# Patient Record
Sex: MALE | Race: WHITE | NOT HISPANIC OR LATINO | Employment: OTHER | URBAN - METROPOLITAN AREA
[De-identification: names, ages, dates, MRNs, and addresses within clinical notes are randomized per-mention and may not be internally consistent; named-entity substitution may affect disease eponyms.]

---

## 2017-04-26 ENCOUNTER — APPOINTMENT (OUTPATIENT)
Dept: LAB | Facility: MEDICAL CENTER | Age: 72
End: 2017-04-26
Payer: MEDICARE

## 2017-04-26 ENCOUNTER — TRANSCRIBE ORDERS (OUTPATIENT)
Dept: ADMINISTRATIVE | Facility: HOSPITAL | Age: 72
End: 2017-04-26

## 2017-04-26 DIAGNOSIS — I10 ESSENTIAL (PRIMARY) HYPERTENSION: ICD-10-CM

## 2017-04-26 DIAGNOSIS — R97.20 ELEVATED PROSTATE SPECIFIC ANTIGEN (PSA): ICD-10-CM

## 2017-04-26 DIAGNOSIS — R97.20 ELEVATED PROSTATE SPECIFIC ANTIGEN (PSA): Primary | ICD-10-CM

## 2017-04-26 DIAGNOSIS — E78.5 HYPERLIPIDEMIA: ICD-10-CM

## 2017-04-26 LAB
ALBUMIN SERPL BCP-MCNC: 3.4 G/DL (ref 3.5–5)
ALP SERPL-CCNC: 53 U/L (ref 46–116)
ALT SERPL W P-5'-P-CCNC: 20 U/L (ref 12–78)
ANION GAP SERPL CALCULATED.3IONS-SCNC: 9 MMOL/L (ref 4–13)
AST SERPL W P-5'-P-CCNC: 11 U/L (ref 5–45)
BASOPHILS # BLD AUTO: 0.08 THOUSANDS/ΜL (ref 0–0.1)
BASOPHILS NFR BLD AUTO: 2 % (ref 0–1)
BILIRUB SERPL-MCNC: 0.42 MG/DL (ref 0.2–1)
BUN SERPL-MCNC: 19 MG/DL (ref 5–25)
CALCIUM SERPL-MCNC: 8.9 MG/DL (ref 8.3–10.1)
CHLORIDE SERPL-SCNC: 105 MMOL/L (ref 100–108)
CHOLEST SERPL-MCNC: 185 MG/DL (ref 50–200)
CO2 SERPL-SCNC: 26 MMOL/L (ref 21–32)
CREAT SERPL-MCNC: 1 MG/DL (ref 0.6–1.3)
EOSINOPHIL # BLD AUTO: 0.24 THOUSAND/ΜL (ref 0–0.61)
EOSINOPHIL NFR BLD AUTO: 5 % (ref 0–6)
ERYTHROCYTE [DISTWIDTH] IN BLOOD BY AUTOMATED COUNT: 13.4 % (ref 11.6–15.1)
GFR SERPL CREATININE-BSD FRML MDRD: >60 ML/MIN/1.73SQ M
GLUCOSE P FAST SERPL-MCNC: 104 MG/DL (ref 65–99)
HCT VFR BLD AUTO: 43.8 % (ref 36.5–49.3)
HDLC SERPL-MCNC: 49 MG/DL (ref 40–60)
HGB BLD-MCNC: 14.5 G/DL (ref 12–17)
LDLC SERPL DIRECT ASSAY-MCNC: 126 MG/DL (ref 0–100)
LYMPHOCYTES # BLD AUTO: 1.3 THOUSANDS/ΜL (ref 0.6–4.47)
LYMPHOCYTES NFR BLD AUTO: 27 % (ref 14–44)
MCH RBC QN AUTO: 29.4 PG (ref 26.8–34.3)
MCHC RBC AUTO-ENTMCNC: 33.1 G/DL (ref 31.4–37.4)
MCV RBC AUTO: 89 FL (ref 82–98)
MONOCYTES # BLD AUTO: 0.6 THOUSAND/ΜL (ref 0.17–1.22)
MONOCYTES NFR BLD AUTO: 13 % (ref 4–12)
NEUTROPHILS # BLD AUTO: 2.53 THOUSANDS/ΜL (ref 1.85–7.62)
NEUTS SEG NFR BLD AUTO: 53 % (ref 43–75)
NRBC BLD AUTO-RTO: 0 /100 WBCS
PLATELET # BLD AUTO: 233 THOUSANDS/UL (ref 149–390)
PMV BLD AUTO: 10.7 FL (ref 8.9–12.7)
POTASSIUM SERPL-SCNC: 4 MMOL/L (ref 3.5–5.3)
PROT SERPL-MCNC: 6.4 G/DL (ref 6.4–8.2)
PSA SERPL-MCNC: 1.8 NG/ML (ref 0–4)
RBC # BLD AUTO: 4.94 MILLION/UL (ref 3.88–5.62)
SODIUM SERPL-SCNC: 140 MMOL/L (ref 136–145)
TSH SERPL DL<=0.05 MIU/L-ACNC: 1.26 UIU/ML (ref 0.36–3.74)
WBC # BLD AUTO: 4.76 THOUSAND/UL (ref 4.31–10.16)

## 2017-04-26 PROCEDURE — 84443 ASSAY THYROID STIM HORMONE: CPT

## 2017-04-26 PROCEDURE — 85025 COMPLETE CBC W/AUTO DIFF WBC: CPT

## 2017-04-26 PROCEDURE — G0103 PSA SCREENING: HCPCS

## 2017-04-26 PROCEDURE — 83718 ASSAY OF LIPOPROTEIN: CPT

## 2017-04-26 PROCEDURE — 82465 ASSAY BLD/SERUM CHOLESTEROL: CPT

## 2017-04-26 PROCEDURE — 80053 COMPREHEN METABOLIC PANEL: CPT

## 2017-04-26 PROCEDURE — 36415 COLL VENOUS BLD VENIPUNCTURE: CPT

## 2017-04-26 PROCEDURE — 83721 ASSAY OF BLOOD LIPOPROTEIN: CPT

## 2017-05-03 ENCOUNTER — GENERIC CONVERSION - ENCOUNTER (OUTPATIENT)
Dept: OTHER | Facility: OTHER | Age: 72
End: 2017-05-03

## 2017-05-10 ENCOUNTER — ALLSCRIPTS OFFICE VISIT (OUTPATIENT)
Dept: OTHER | Facility: OTHER | Age: 72
End: 2017-05-10

## 2017-05-22 ENCOUNTER — HOSPITAL ENCOUNTER (OUTPATIENT)
Dept: RADIOLOGY | Facility: MEDICAL CENTER | Age: 72
Discharge: HOME | End: 2017-05-22
Payer: MEDICARE

## 2017-05-22 DIAGNOSIS — M18.12 PRIMARY OSTEOARTHRITIS OF FIRST CARPOMETACARPAL JOINT OF LEFT HAND: ICD-10-CM

## 2017-05-22 PROCEDURE — 73130 X-RAY EXAM OF HAND: CPT

## 2017-05-30 ENCOUNTER — GENERIC CONVERSION - ENCOUNTER (OUTPATIENT)
Dept: OTHER | Facility: OTHER | Age: 72
End: 2017-05-30

## 2017-06-21 ENCOUNTER — ALLSCRIPTS OFFICE VISIT (OUTPATIENT)
Dept: OTHER | Facility: OTHER | Age: 72
End: 2017-06-21

## 2017-10-10 ENCOUNTER — GENERIC CONVERSION - ENCOUNTER (OUTPATIENT)
Dept: OTHER | Facility: OTHER | Age: 72
End: 2017-10-10

## 2017-11-06 ENCOUNTER — GENERIC CONVERSION - ENCOUNTER (OUTPATIENT)
Dept: OTHER | Facility: OTHER | Age: 72
End: 2017-11-06

## 2017-11-09 ENCOUNTER — TRANSCRIBE ORDERS (OUTPATIENT)
Dept: ADMINISTRATIVE | Facility: HOSPITAL | Age: 72
End: 2017-11-09

## 2017-11-09 ENCOUNTER — APPOINTMENT (OUTPATIENT)
Dept: LAB | Facility: MEDICAL CENTER | Age: 72
End: 2017-11-09
Payer: MEDICARE

## 2017-11-09 DIAGNOSIS — E78.5 HYPERLIPIDEMIA: ICD-10-CM

## 2017-11-09 LAB
ALBUMIN SERPL BCP-MCNC: 3.5 G/DL (ref 3.5–5)
ALP SERPL-CCNC: 53 U/L (ref 46–116)
ALT SERPL W P-5'-P-CCNC: 23 U/L (ref 12–78)
ANION GAP SERPL CALCULATED.3IONS-SCNC: 6 MMOL/L (ref 4–13)
AST SERPL W P-5'-P-CCNC: 15 U/L (ref 5–45)
BILIRUB SERPL-MCNC: 0.46 MG/DL (ref 0.2–1)
BUN SERPL-MCNC: 21 MG/DL (ref 5–25)
CALCIUM SERPL-MCNC: 9.3 MG/DL (ref 8.3–10.1)
CHLORIDE SERPL-SCNC: 105 MMOL/L (ref 100–108)
CHOLEST SERPL-MCNC: 212 MG/DL (ref 50–200)
CO2 SERPL-SCNC: 28 MMOL/L (ref 21–32)
CREAT SERPL-MCNC: 1.08 MG/DL (ref 0.6–1.3)
GFR SERPL CREATININE-BSD FRML MDRD: 67 ML/MIN/1.73SQ M
GLUCOSE P FAST SERPL-MCNC: 94 MG/DL (ref 65–99)
HDLC SERPL-MCNC: 52 MG/DL (ref 40–60)
LDLC SERPL CALC-MCNC: 133 MG/DL (ref 0–100)
POTASSIUM SERPL-SCNC: 4.3 MMOL/L (ref 3.5–5.3)
PROT SERPL-MCNC: 6.5 G/DL (ref 6.4–8.2)
SODIUM SERPL-SCNC: 139 MMOL/L (ref 136–145)
TRIGL SERPL-MCNC: 136 MG/DL

## 2017-11-09 PROCEDURE — 80053 COMPREHEN METABOLIC PANEL: CPT

## 2017-11-09 PROCEDURE — 36415 COLL VENOUS BLD VENIPUNCTURE: CPT

## 2017-11-09 PROCEDURE — 80061 LIPID PANEL: CPT

## 2017-11-10 DIAGNOSIS — R06.02 SHORTNESS OF BREATH: ICD-10-CM

## 2017-11-10 DIAGNOSIS — E78.5 HYPERLIPIDEMIA: ICD-10-CM

## 2017-11-10 DIAGNOSIS — J18.9 PNEUMONIA: ICD-10-CM

## 2017-11-10 DIAGNOSIS — R06.09 OTHER FORMS OF DYSPNEA: ICD-10-CM

## 2017-11-10 DIAGNOSIS — R55 SYNCOPE AND COLLAPSE: ICD-10-CM

## 2017-11-20 ENCOUNTER — ALLSCRIPTS OFFICE VISIT (OUTPATIENT)
Dept: OTHER | Facility: OTHER | Age: 72
End: 2017-11-20

## 2017-11-21 ENCOUNTER — APPOINTMENT (OUTPATIENT)
Dept: PHYSICAL THERAPY | Facility: REHABILITATION | Age: 72
End: 2017-11-21
Payer: MEDICARE

## 2017-11-21 PROCEDURE — G8991 OTHER PT/OT GOAL STATUS: HCPCS

## 2017-11-21 PROCEDURE — 97110 THERAPEUTIC EXERCISES: CPT

## 2017-11-21 PROCEDURE — 97161 PT EVAL LOW COMPLEX 20 MIN: CPT

## 2017-11-21 PROCEDURE — G8990 OTHER PT/OT CURRENT STATUS: HCPCS

## 2017-11-21 NOTE — PROGRESS NOTES
Assessment    1  Hypertension (401 9) (I10)   2  Hyperlipidemia (272 4) (E78 5)   3  Hyperglycemia (790 29) (R73 9)   4  Elevated prostate specific antigen (PSA) (790 93) (R97 20)    Plan  Hyperlipidemia    · (1) COMPREHENSIVE METABOLIC PANEL; Status:Active; Requested for:20Nov2018;    · (1) LIPID PANEL, FASTING; Status:Active; Requested for:20Nov2018;   Hypertension    · A diet low in sodium and high in potassium, magnesium, and calcium can help yourblood pressure ; Status:Complete;   Done: 44TRN4301 08:23AM   · Begin a limited exercise program ; Status:Complete;   Done: 29ZFS0167 08:23AM   · Continue with our present treatment plan ; Status:Complete;   Done: 13WZM992630:59KI   · Eat a low fat and low cholesterol diet ; Status:Complete;   Done: 52MMT7972 08:23AM   · We encourage you to begin to make lifestyle changes to help control your bloodpressure  These may include losing weight, increasing your activity level, limiting salt inyour diet, decreasing alcohol intake, and eating a diet low in fat and rich in fruitsand vegetables ; Status:Complete;   Done: 07IEX0219 08:23AM   · Call [phone] if: You become dizzy or lightheaded, especially when you stand upafter sitting for a while ; Status:Complete;   Done: 41QSQ3191 08:23AM   · Call [phone] if: You develop double vision (see two of everything)  ;Status:Complete;   Done: 94DTC2868 08:23AM   · Call [phone] if: Your blood pressure is frequently higher than 140/90  ;Status:Complete;   Done: 63LST6591 08:23AM   · Call 911 if: You experience a new kind of chest pain (angina) or pressure  ;Status:Complete;   Done: 84SHQ1187 08:23AM   · Call 911 if: You have any symptoms of a stroke ; Status:Complete;   Done: 56LPJ441731:31ON   · Seek Immediate Medical Attention if: You have a severe headache that will not go away  ;Status:Complete;   Done: 92PTJ7008 08:23AM   · Seek Immediate Medical Attention if: Your blood pressure is greater than 250/120 for 2consecutive readings ; Status:Complete;   Done: 58ERL3700 08:23AM    Discussion/Summary    1 : Hypertension: Stable at the moment  Blood pressure doing relatively well today  No changes  Re-evaluate in 6 months  Hyperlipidemia: Cholesterol is definitely elevated versus before, however is off statins at this point  For the moment, we will remain off statins, and recheck in 1 year  We did discuss the increased risk it might propose, but at his age there is an increased chance of side effects with the medication as well  Hyperglycemia: Blood sugar today was quite good  No changes  Limit carbohydrate intake  Increased PSA: Check labs in the future  It is scheduled for May of 2018 note, with memory issues his mini-mental status exam was completely normal, and the clock drawing was also normal  He is to stay as active as he can with regard to brain function, i e  reading, puzzles, social interaction  Chief Complaint  6 month follow up to chronic conditions and review bw  FLU shot and TD shot  History of Present Illness  Laboratory studies reviewed  Please see the copies on the chart  cholesterol 212, , HDL 52, triglycerides 136 was normal did mention that his wife was concerned he lost focus at times  No concerns  He is off statins  Is limiting sugars  diet changes  Review of Systems   Constitutional: No fever or chills, feels well, no tiredness, no recent weight gain or weight loss  Eyes: No complaints of eye pain, no red eyes, no discharge from eyes, no itchy eyes  ENT: no complaints of earache, no hearing loss, no nosebleeds, no nasal discharge, no sore throat, no hoarseness  Cardiovascular: No complaints of slow heart rate, no fast heart rate, no chest pain, no palpitations, no leg claudication, no lower extremity  Respiratory: No complaints of shortness of breath, no wheezing, no cough, no SOB on exertion, no orthopnea or PND    Gastrointestinal: No complaints of abdominal pain, no constipation, no nausea or vomiting, no diarrhea or bloody stools  Genitourinary: No complaints of dysuria, no incontinence, no hesitancy, no nocturia, no genital lesion, no testicular pain  Musculoskeletal: No complaints of arthralgia, no myalgias, no joint swelling or stiffness, no limb pain or swelling  Integumentary: No complaints of skin rash or skin lesions, no itching, no skin wound, no dry skin  Neurological: No compliants of headache, no confusion, no convulsions, no numbness or tingling, no dizziness or fainting, no limb weakness, no difficulty walking  Psychiatric: Is not suicidal, no sleep disturbances, no anxiety or depression, no change in personality, no emotional problems  Endocrine: No complaints of proptosis, no hot flashes, no muscle weakness, no erectile dysfunction, no deepening of the voice, no feelings of weakness  Hematologic/Lymphatic: No complaints of swollen glands, no swollen glands in the neck, does not bleed easily, no easy bruising  Active Problems  1  Allergic rhinitis (477 9) (J30 9)   2  Anxiety (300 00) (F41 9)   3  Arthritis (716 90) (M19 90)   4  Cerumen impaction (380 4) (H61 20)   5  CMC arthritis (716 94) (M19 049)   6  Colon cancer screening (V76 51) (Z12 11)   7  Corneal abrasion (918 1) (S05 00XA)   8  Elevated prostate specific antigen (PSA) (790 93) (R97 20)   9  Hyperglycemia (790 29) (R73 9)   10  Hyperlipidemia (272 4) (E78 5)   11  Hypertension (401 9) (I10)   12  Influenza vaccination administered at current visit (V04 81) (Z23)   13  Long term use of drug (V58 69) (Z79 899)   14  Lumbar radiculopathy (724 4) (M54 16)   15  Onychomycosis of toenail (110 1) (B35 1)   16  Primary osteoarthritis of first carpometacarpal joint of left hand (715 14) (M18 12)   17  Screening for genitourinary condition (V81 6) (Z13 89)   18  Skin lesion (709 9) (L98 9)   19  Thumb pain, left (729 5) (M79 645)   20  Vertigo (780 4) (R42)    Past Medical History  1   History of A Fall (E888 9) 2  Acute bronchitis (466 0) (J20 9)   3  History of Closed Fracture Of One Rib (807 01)   4  History of bronchitis (V12 69) (Z87 09)    The active problems and past medical history were reviewed and updated today  Surgical History  1  Denied: History of Surgery    The surgical history was reviewed and updated today  Family History  Mother    1  Family history of Stroke Syndrome (V17 1)  Father    2  Family history of Alcohol Abuse    The family history was reviewed and updated today  Social History     · Activities: Skiing   · Activities: Tennis   · Being A Social Drinker   · Denied: History of Drug Use   · Marital History - Currently    · Never a smoker   · Never A Smoker  The social history was reviewed and updated today  The social history was reviewed and is unchanged  Current Meds   1  [employer] Vitamin C 500 MG TABS; Take 1 tablet daily Recorded   2  Aspirin 81 MG Oral Tablet Delayed Release; Take 1 daily; Therapy: 68HWP5103 to [phone]); Last Rx:26Oct2016 Ordered   3  HydroCHLOROthiazide 25 MG Oral Tablet; Take one-half tablet by  mouth every day; Therapy: 46UWP0485 to (Evaluate:46Luz8916)  Requested for: 86KXP7925; Last Rx:02Qsg4867 Ordered   4  Metoprolol Tartrate 50 MG Oral Tablet; Take 1/2 tablet by mouth in the morning and 1 tablet by mouth at bedtime; Therapy: 95JZP4873 to (Evaluate:97Bhj7537)  Requested for: 19Oct2017; Last Rx:95Tqc4154 Ordered   5  NasalCrom 5 2 MG/ACT Nasal Aerosol Solution; USE 1 SPRAY IN EACH NOSTRIL 3-4 TIMES DAILY, EVERY 4-6 HOURS; Therapy: 15TAU0837 to (Evaluate:06Nov2017)  Requested for: 01AAC7482; Last Rx:83Smq3043 Ordered   6  Vitamin D 1000 UNIT Oral Tablet; Therapy: 86QDN9835 to Recorded    The medication list was reviewed and updated today  Allergies  1   No Known Drug Allergies    Vitals  Vital Signs    Recorded: 89GLN9485 08:11AM   Heart Rate 76   Systolic 181   Diastolic 74   Height 5 ft 8 in   Weight 168 lb    BMI Calculated 25 54   BSA Calculated 1 9       Physical Exam   Constitutional  General appearance: No acute distress, well appearing and well nourished  Pulmonary  Respiratory effort: No increased work of breathing or signs of respiratory distress  Auscultation of lungs: Clear to auscultation, equal breath sounds bilaterally, no wheezes, no rales, no rhonci  Cardiovascular  Auscultation of heart: Normal rate and rhythm, normal S1 and S2, without murmurs  Carotid pulses: Normal       Mental Status Exam:  the patient achieved a score of 30, Clock draw normal /30 -- date / time 5 / 5 -- place 5 / 5 -- registration 3 / 3 -- serial sevens 5 / 5 -- naming 2 objects 2 / 2 -- repeating a sentence 1 / 1  -- write sentence 1 / 1 -- 3-stage verbal command 3 / 3  -- written command 1 / 1 -- copy design 1 / 1 -- recall 3 / 3  Results/Data  (1) LIPID PANEL, FASTING 64JPE3087 08:32AM Izzy Morrelline Order Number: [id number]     Test Name Result Flag Reference   CHOLESTEROL 212 mg/dL H    HDL,DIRECT 52 mg/dL  40-60   Specimen collection should occur prior to Metamizole administration due to the potential for falsley depressed results  LDL CHOLESTEROL CALCULATED 133 mg/dL H 0-100     Triglyceride:       Normal <150 mg/dl  Borderline High 150-199 mg/dl  High 200-499 mg/dl  Very High >499 mg/dl   Cholesterol:      Desirable <200 mg/dl   Borderline High 200-239 mg/dl   High >239 mg/dl   HDL Cholesterol:      High>59 mg/dL   Low <41 mg/dL   This screening LDL is a calculated result  It does not have the accuracy of the Direct Measured LDL in the monitoring of patients with hyperlipidemia and/or statin therapy  Direct Measure LDL (WHR195) must be ordered separately in these patients  TRIGLYCERIDES 136 mg/dL  <=150   Specimen collection should occur prior to N-Acetylcysteine or Metamizole administration due to the potential for falsely depressed results  Signatures   Electronically signed by :  Margarito Lauren M D ; Nov 20 2017  8:38AM EST                       (Author)

## 2017-11-27 ENCOUNTER — APPOINTMENT (OUTPATIENT)
Dept: PHYSICAL THERAPY | Facility: REHABILITATION | Age: 72
End: 2017-11-27
Payer: MEDICARE

## 2017-11-27 PROCEDURE — 97112 NEUROMUSCULAR REEDUCATION: CPT

## 2017-11-30 ENCOUNTER — APPOINTMENT (OUTPATIENT)
Dept: PHYSICAL THERAPY | Facility: REHABILITATION | Age: 72
End: 2017-11-30
Payer: MEDICARE

## 2017-12-04 ENCOUNTER — ALLSCRIPTS OFFICE VISIT (OUTPATIENT)
Dept: OTHER | Facility: OTHER | Age: 72
End: 2017-12-04

## 2017-12-04 ENCOUNTER — APPOINTMENT (OUTPATIENT)
Dept: LAB | Facility: MEDICAL CENTER | Age: 72
End: 2017-12-04
Payer: MEDICARE

## 2017-12-04 ENCOUNTER — HOSPITAL ENCOUNTER (OUTPATIENT)
Dept: CT IMAGING | Facility: HOSPITAL | Age: 72
Discharge: HOME | End: 2017-12-04
Payer: MEDICARE

## 2017-12-04 DIAGNOSIS — R06.02 SHORTNESS OF BREATH: ICD-10-CM

## 2017-12-04 DIAGNOSIS — R06.09 OTHER FORMS OF DYSPNEA: ICD-10-CM

## 2017-12-04 DIAGNOSIS — J18.9 PNEUMONIA: ICD-10-CM

## 2017-12-04 DIAGNOSIS — R55 SYNCOPE AND COLLAPSE: ICD-10-CM

## 2017-12-04 LAB
ALBUMIN SERPL BCP-MCNC: 3.3 G/DL (ref 3.5–5)
ALP SERPL-CCNC: 57 U/L (ref 46–116)
ALT SERPL W P-5'-P-CCNC: 40 U/L (ref 12–78)
ANION GAP SERPL CALCULATED.3IONS-SCNC: 8 MMOL/L (ref 4–13)
AST SERPL W P-5'-P-CCNC: 20 U/L (ref 5–45)
BASOPHILS # BLD MANUAL: 0 THOUSAND/UL (ref 0–0.1)
BASOPHILS NFR MAR MANUAL: 0 % (ref 0–1)
BILIRUB SERPL-MCNC: 0.48 MG/DL (ref 0.2–1)
BUN SERPL-MCNC: 16 MG/DL (ref 5–25)
CALCIUM SERPL-MCNC: 9.7 MG/DL (ref 8.3–10.1)
CHLORIDE SERPL-SCNC: 105 MMOL/L (ref 100–108)
CO2 SERPL-SCNC: 27 MMOL/L (ref 21–32)
CREAT SERPL-MCNC: 1.07 MG/DL (ref 0.6–1.3)
EOSINOPHIL # BLD MANUAL: 0.08 THOUSAND/UL (ref 0–0.4)
EOSINOPHIL NFR BLD MANUAL: 1 % (ref 0–6)
ERYTHROCYTE [DISTWIDTH] IN BLOOD BY AUTOMATED COUNT: 13.1 % (ref 11.6–15.1)
GFR SERPL CREATININE-BSD FRML MDRD: 68 ML/MIN/1.73SQ M
GLUCOSE SERPL-MCNC: 99 MG/DL (ref 65–140)
HCT VFR BLD AUTO: 42.6 % (ref 36.5–49.3)
HGB BLD-MCNC: 14.6 G/DL (ref 12–17)
LYMPHOCYTES # BLD AUTO: 1.6 THOUSAND/UL (ref 0.6–4.47)
LYMPHOCYTES # BLD AUTO: 21 % (ref 14–44)
MCH RBC QN AUTO: 30.2 PG (ref 26.8–34.3)
MCHC RBC AUTO-ENTMCNC: 34.3 G/DL (ref 31.4–37.4)
MCV RBC AUTO: 88 FL (ref 82–98)
METAMYELOCYTES NFR BLD MANUAL: 6 % (ref 0–1)
MONOCYTES # BLD AUTO: 0.91 THOUSAND/UL (ref 0–1.22)
MONOCYTES NFR BLD: 12 % (ref 4–12)
MYELOCYTES NFR BLD MANUAL: 3 % (ref 0–1)
NEUTROPHILS # BLD MANUAL: 4.33 THOUSAND/UL (ref 1.85–7.62)
NEUTS SEG NFR BLD AUTO: 57 % (ref 43–75)
NRBC BLD AUTO-RTO: 0 /100 WBCS
PLATELET # BLD AUTO: 378 THOUSANDS/UL (ref 149–390)
PLATELET BLD QL SMEAR: ADEQUATE
PMV BLD AUTO: 10.3 FL (ref 8.9–12.7)
POTASSIUM SERPL-SCNC: 3.9 MMOL/L (ref 3.5–5.3)
PROT SERPL-MCNC: 7.4 G/DL (ref 6.4–8.2)
RBC # BLD AUTO: 4.84 MILLION/UL (ref 3.88–5.62)
RBC MORPH BLD: NORMAL
SODIUM SERPL-SCNC: 140 MMOL/L (ref 136–145)
TSH SERPL DL<=0.05 MIU/L-ACNC: 0.5 UIU/ML (ref 0.36–3.74)
WBC # BLD AUTO: 7.6 THOUSAND/UL (ref 4.31–10.16)

## 2017-12-04 PROCEDURE — 80053 COMPREHEN METABOLIC PANEL: CPT

## 2017-12-04 PROCEDURE — 85007 BL SMEAR W/DIFF WBC COUNT: CPT

## 2017-12-04 PROCEDURE — 36415 COLL VENOUS BLD VENIPUNCTURE: CPT

## 2017-12-04 PROCEDURE — 84443 ASSAY THYROID STIM HORMONE: CPT

## 2017-12-04 PROCEDURE — 85027 COMPLETE CBC AUTOMATED: CPT

## 2017-12-04 PROCEDURE — 71250 CT THORAX DX C-: CPT

## 2017-12-05 ENCOUNTER — GENERIC CONVERSION - ENCOUNTER (OUTPATIENT)
Dept: OTHER | Facility: OTHER | Age: 72
End: 2017-12-05

## 2017-12-11 ENCOUNTER — HOSPITAL ENCOUNTER (OUTPATIENT)
Dept: NON INVASIVE DIAGNOSTICS | Facility: HOSPITAL | Age: 72
Discharge: HOME | End: 2017-12-11
Payer: MEDICARE

## 2017-12-11 ENCOUNTER — GENERIC CONVERSION - ENCOUNTER (OUTPATIENT)
Dept: OTHER | Facility: OTHER | Age: 72
End: 2017-12-11

## 2017-12-11 ENCOUNTER — GENERIC CONVERSION - ENCOUNTER (OUTPATIENT)
Dept: FAMILY MEDICINE CLINIC | Facility: CLINIC | Age: 72
End: 2017-12-11

## 2017-12-11 DIAGNOSIS — J18.9 PNEUMONIA: ICD-10-CM

## 2017-12-11 DIAGNOSIS — R06.09 OTHER FORMS OF DYSPNEA: ICD-10-CM

## 2017-12-11 DIAGNOSIS — R06.02 SHORTNESS OF BREATH: ICD-10-CM

## 2017-12-11 DIAGNOSIS — R55 SYNCOPE AND COLLAPSE: ICD-10-CM

## 2017-12-11 PROCEDURE — 93226 XTRNL ECG REC<48 HR SCAN A/R: CPT

## 2017-12-11 PROCEDURE — 93306 TTE W/DOPPLER COMPLETE: CPT

## 2017-12-11 PROCEDURE — 93225 XTRNL ECG REC<48 HRS REC: CPT

## 2017-12-12 ENCOUNTER — GENERIC CONVERSION - ENCOUNTER (OUTPATIENT)
Dept: OTHER | Facility: OTHER | Age: 72
End: 2017-12-12

## 2017-12-12 ENCOUNTER — HOSPITAL ENCOUNTER (OUTPATIENT)
Dept: NON INVASIVE DIAGNOSTICS | Facility: CLINIC | Age: 72
Discharge: HOME | End: 2017-12-12
Payer: MEDICARE

## 2017-12-12 DIAGNOSIS — R06.09 OTHER FORMS OF DYSPNEA: ICD-10-CM

## 2017-12-12 DIAGNOSIS — J18.9 PNEUMONIA: ICD-10-CM

## 2017-12-12 DIAGNOSIS — R06.02 SHORTNESS OF BREATH: ICD-10-CM

## 2017-12-12 DIAGNOSIS — R55 SYNCOPE AND COLLAPSE: ICD-10-CM

## 2017-12-12 PROCEDURE — 93880 EXTRACRANIAL BILAT STUDY: CPT

## 2017-12-14 ENCOUNTER — ALLSCRIPTS OFFICE VISIT (OUTPATIENT)
Dept: OTHER | Facility: OTHER | Age: 72
End: 2017-12-14

## 2017-12-14 DIAGNOSIS — R94.6 ABNORMAL RESULTS OF THYROID FUNCTION STUDIES: ICD-10-CM

## 2017-12-15 ENCOUNTER — GENERIC CONVERSION - ENCOUNTER (OUTPATIENT)
Dept: OTHER | Facility: OTHER | Age: 72
End: 2017-12-15

## 2017-12-19 ENCOUNTER — HOSPITAL ENCOUNTER (OUTPATIENT)
Dept: ULTRASOUND IMAGING | Facility: MEDICAL CENTER | Age: 72
Discharge: HOME | End: 2017-12-19
Payer: MEDICARE

## 2017-12-19 DIAGNOSIS — R94.6 ABNORMAL RESULTS OF THYROID FUNCTION STUDIES: ICD-10-CM

## 2017-12-19 PROCEDURE — 76536 US EXAM OF HEAD AND NECK: CPT

## 2017-12-22 ENCOUNTER — GENERIC CONVERSION - ENCOUNTER (OUTPATIENT)
Dept: OTHER | Facility: OTHER | Age: 72
End: 2017-12-22

## 2018-01-12 VITALS
DIASTOLIC BLOOD PRESSURE: 89 MMHG | BODY MASS INDEX: 25.22 KG/M2 | WEIGHT: 166.38 LBS | HEIGHT: 68 IN | HEART RATE: 66 BPM | SYSTOLIC BLOOD PRESSURE: 150 MMHG

## 2018-01-13 VITALS
WEIGHT: 170 LBS | HEIGHT: 68 IN | HEART RATE: 74 BPM | DIASTOLIC BLOOD PRESSURE: 78 MMHG | SYSTOLIC BLOOD PRESSURE: 130 MMHG | BODY MASS INDEX: 25.76 KG/M2

## 2018-01-15 VITALS
SYSTOLIC BLOOD PRESSURE: 122 MMHG | HEIGHT: 68 IN | WEIGHT: 168 LBS | HEART RATE: 76 BPM | BODY MASS INDEX: 25.46 KG/M2 | DIASTOLIC BLOOD PRESSURE: 74 MMHG

## 2018-01-16 NOTE — RESULT NOTES
Verified Results  (1) TSH 26Apr2017 08:24AM Douige Etienne   TW Order Number: [id number]     Test Name Result Flag Reference   TSH 1 260 uIU/mL  0 358-3 740   - Patient Instructions: This bloodwork is non-fasting  Please drink two glasses of water morning of bloodwork  Patients undergoing fluorescein dye angiography may retain small amounts of fluorescein in the body for 48-72 hours post procedure  Samples containing fluorescein can produce falsely depressed TSH values  If the patient had this procedure,a specimen should be resubmitted post fluorescein clearance  (1) COMPREHENSIVE METABOLIC PANEL 78JQD2938 68:68PV Dougie Etienne   TW Order Number: [id number]     Test Name Result Flag Reference   SODIUM 140 mmol/L  136-145   POTASSIUM 4 0 mmol/L  3 5-5 3   CHLORIDE 105 mmol/L  100-108   CARBON DIOXIDE 26 mmol/L  21-32   ANION GAP (CALC) 9 mmol/L  4-13   BLOOD UREA NITROGEN 19 mg/dL  5-25   CREATININE 1 00 mg/dL  0 60-1 30   Standardized to IDMS reference method   CALCIUM 8 9 mg/dL  8 3-10 1   BILI, TOTAL 0 42 mg/dL  0 20-1 00   ALK PHOSPHATAS 53 U/L     ALT (SGPT) 20 U/L  12-78   AST(SGOT) 11 U/L  5-45   ALBUMIN 3 4 g/dL L 3 5-5 0   TOTAL PROTEIN 6 4 g/dL  6 4-8 2   eGFR Non-African American      >60 0 ml/min/1 73sq [facility] Disease Education Program recommendations are as follows:  GFR calculation is accurate only with a steady state creatinine  Chronic Kidney disease less than 60 ml/min/1 73 sq  meters  Kidney failure less than 15 ml/min/1 73 sq  meters     GLUCOSE FASTING 104 mg/dL H 65-99     (1) CBC/PLT/DIFF 26Apr2017 08:24AM Dougie Etienne   TW Order Number: [id number]     Test Name Result Flag Reference   WBC COUNT 4 76 Thousand/uL  4 31-10 16   RBC COUNT 4 94 Million/uL  3 88-5 62   HEMOGLOBIN 14 5 g/dL  12 0-17 0   HEMATOCRIT 43 8 %  36 5-49 3   MCV 89 fL  82-98   MCH 29 4 pg  26 8-34 3   MCHC 33 1 g/dL  31 4-37 4   RDW 13 4 %  11 6-15 1   MPV 10 7 fL 8  9-12 7   PLATELET COUNT 808 Thousands/uL  149-390   nRBC AUTOMATED 0 /100 WBCs     NEUTROPHILS RELATIVE PERCENT 53 %  43-75   LYMPHOCYTES RELATIVE PERCENT 27 %  14-44   MONOCYTES RELATIVE PERCENT 13 % H 4-12   EOSINOPHILS RELATIVE PERCENT 5 %  0-6   BASOPHILS RELATIVE PERCENT 2 % H 0-1   NEUTROPHILS ABSOLUTE COUNT 2 53 Thousands/? ??L  1 85-7 62   LYMPHOCYTES ABSOLUTE COUNT 1 30 Thousands/? ??L  0 60-4 47   MONOCYTES ABSOLUTE COUNT 0 60 Thousand/? ??L  0 17-1 22   EOSINOPHILS ABSOLUTE COUNT 0 24 Thousand/? ??L  0 00-0 61   BASOPHILS ABSOLUTE COUNT 0 08 Thousands/? ??L  0 00-0 10   - Patient Instructions: This bloodwork is non-fasting  Please drink two glasses of water morning of bloodwork  (1) CHOLESTEROL, TOTAL 26Apr2017 08:24AM [employer] Order Number: [id number]     Test Name Result Flag Reference   CHOLESTEROL 185 mg/dL     Cholesterol:         Desirable        <200 mg/dl      Borderline High  200-239 mg/dl      High             >239 mg/dl     (1) LDL,DIRECT 66NOK8716 08:24AM [employer] Order Number: [id number]     Test Name Result Flag Reference   LDL, DIRECT 126 mg/dl H 0-100   - Patient Instructions: This is a fasting blood test  Water,black tea or black  coffee only after 9:00pm the night before test   Drink 2 glasses of water the morning of test       LDL Cholesterol:        Optimal          <100 mg/dl        Near Optimal     100-129 mg/dl        Above Optimal          Borderline High   130-159 mg/dl          High              160-189 mg/dl          Very High        >189 mg/dl     (1) HDL,DIRECT 26Apr2017 08:24AM [employer] Order Number: [id number]     Test Name Result Flag Reference   HDL,DIRECT 49 mg/dL  40-60   Specimen collection should occur prior to Metamizole administration due to the potential for falsely depressed results       (1) PSA (SCREEN) (Dx V76 44 Screen for Prostate Cancer) 26Apr2017 08:24AM Let[facility]     Test Name Result Flag Reference   PROSTATE SPECIFIC ANTIGEN 1 8 ng/mL  0 0-4 0   American Urological Association Guidelines define biochemical recurrence of prostate cancer as a detectable or rising PSA value post-radical prostatectomy that is greater than or equal to 0 2 ng/mL with a second confirmatory level of greater than or equal to 0 2 ng/mL  This bloodwork is non-fasting  Please drink two glasses of water morning of  bloodwork

## 2018-01-17 ENCOUNTER — GENERIC CONVERSION - ENCOUNTER (OUTPATIENT)
Dept: FAMILY MEDICINE CLINIC | Facility: CLINIC | Age: 73
End: 2018-01-17

## 2018-01-17 NOTE — RESULT NOTES
Message   Hemoccult negative  Follow up at normal office visit       Verified Results  (1) OCCULT BLOOD, FECAL IMMUNOCHEMICAL TEST 98XYC5192 02:16PM Izzy Bridges Order Number: [id number]     Test Name Result Flag Reference   OCCULT BLD, FECAL IMMUNOLOGICAL Negative  Negative   POSITIVE CONTROL Positive     NEGATIVE CONTROL Negative     Performed by Fecal Immunochemical Test

## 2018-01-18 NOTE — RESULT NOTES
Verified Results  * XR HAND 3+ VIEW LEFT 06FJJ1503 12:34PM Daryl Del Valle    Order Number: [id number]     Test Name Result Flag Reference   XR HAND 3+ VW LEFT (Report)     LEFT HAND     INDICATION: Pain within first digit  COMPARISON: October 2, 2015     VIEWS: 3     IMAGES: 4     For the purposes of institution wide universal language the following terms will apply: (thumb=1st digit/finger, index finger=2nd digit/finger, long finger=3rd digit/finger, ring=4th digit/finger and small finger=5th digit/finger)     FINDINGS:     There is no acute fracture or dislocation  Osteoarthritis is noted within the first carpal metacarpal joint  This may have shown slight interval progression when compared to prior study from 2015  Joint space narrowing and degenerative changes are seen within the DIP joints of the second third    fourth and fifth digits  No lytic or blastic lesions are seen  Soft tissues are unremarkable         IMPRESSION:     Osteoarthritis first carpometacarpal joint as well as within the DIP joints       Workstation performed: [id number]     Signed by:   Rolando Prasad MD   5/25/17

## 2018-01-18 NOTE — MISCELLANEOUS
Message  Wife was concerned patient may have some memory loss  Signatures   Electronically signed by :  DANG Phelan ; Oct 10 2017 12:38PM EST                       (Author)

## 2018-01-23 VITALS
BODY MASS INDEX: 24.83 KG/M2 | HEART RATE: 88 BPM | DIASTOLIC BLOOD PRESSURE: 72 MMHG | WEIGHT: 163.8 LBS | RESPIRATION RATE: 16 BRPM | HEIGHT: 68 IN | SYSTOLIC BLOOD PRESSURE: 130 MMHG

## 2018-01-23 VITALS
WEIGHT: 163.13 LBS | HEART RATE: 88 BPM | DIASTOLIC BLOOD PRESSURE: 80 MMHG | HEIGHT: 68 IN | SYSTOLIC BLOOD PRESSURE: 138 MMHG | BODY MASS INDEX: 24.72 KG/M2 | TEMPERATURE: 98.6 F

## 2018-01-23 NOTE — RESULT NOTES
Verified Results  US THYROID [id number] 04:23PM Amelie Aguilar Order Number: [id number]    - Patient Instructions: To schedule this appointment, please contact Central Scheduling at [phone]  Test Name Result Flag Reference   US THYROID (Report)     This is a summary report  The complete report is available in the patient's medical record  If you cannot access the medical record, please contact the sending organization for a detailed fax or copy  THYROID ULTRASOUND     INDICATION: Abnormal thyroid on carotid ultrasound     COMPARISON: None  TECHNIQUE:  Ultrasound of the thyroid was performed with a high frequency linear transducer in transverse and sagittal planes including volumetric imaging sweeps as well as traditional still imaging technique  FINDINGS: Normal homogeneous smooth echotexture  Right lobe: 2 6 x 1 9 x 4 9 cm  Left lobe: 2 4 x 2 1 x 4 7 cm  Isthmus:   cm      Nodule #1   RIGHT lower pole nodule measuring 1 5 x 1 3 x 1 6 cm  Unchanged from prior  COMPOSITION: 2 points, solid or almost completely solid   ECHOGENICITY: 1 point, hyperechoic or isoechoic  SHAPE: 0 points, wider-than-tall  MARGIN: 0 points, smooth  ECHOGENIC FOCI: 0 points, none or large comet-tail artifacts  TI-RADS Score: TR 3 (3 points), Mildly suspicious  FNA if >2 5 cm  Follow if >1 5 cm  Nodule #2   Isthmus nodule measuring 1 3 x 2 8 x 2 5 cm  No priors for comparison  COMPOSITION: 2 points, solid or almost completely solid   ECHOGENICITY: 1 point, hyperechoic or isoechoic  SHAPE: 0 points, wider-than-tall  MARGIN: 0 points, smooth  ECHOGENIC FOCI: 0 points, none or large comet-tail artifacts  TI-RADS Score: TR 3 (3 points), Mildly suspicious  FNA if >2 5 cm  Follow if >1 5 cm  Nodule #3   LEFT upper pole nodule measuring 1 3 x 2 2 x 2 2 cm  No priors for comparison  COMPOSITION: 2 points, solid or almost completely solid      ECHOGENICITY: 1 point, hyperechoic or isoechoic  SHAPE: 0 points, wider-than-tall  MARGIN: 0 points, smooth  ECHOGENIC FOCI: 1 point, macrocalcifications  TI-RADS Score: TR 4 (4-6 points), Moderately suspicious  FNA if > 1 5 cm  Follow if > 1cm  Nodule #4   LEFT lower pole nodule measuring 1 2 x 1 1 x 1 3 cm  No priors for comparison  COMPOSITION: 2 points, solid or almost completely solid   ECHOGENICITY: 2 points, hypoechoic  SHAPE: 0 points, wider-than-tall  MARGIN: 0 points, smooth  ECHOGENIC FOCI: 0 points, none or large comet-tail artifacts  TI-RADS Score: TR 4 (4-6 points), Moderately suspicious  FNA if > 1 5 cm  Follow if > 1cm  IMPRESSION:     Multiple thyroid nodules  Nodules #2 and #3 meet ultrasound criteria for fine needle aspiration  Follow-up recommended for additional nodules  Reference: ACR Thyroid Imaging, Reporting and Data System (TI-RADS): White Paper of the [employer]   J AM Shaw Radiol 3120;01:789-295  (additional recommendations based on American Thyroid Association 2015 guidelines )       Workstation performed: [id number]     Signed by:   Orlin Gillespie MD   12/21/17

## 2018-01-23 NOTE — RESULT NOTES
Verified Results  * CT CHEST WO CONTRAST [id number] 02:44PM Asia Phlegm Order Number: [id number]    - Patient Instructions: To schedule this appointment, please contact Central Scheduling at [phone]  Test Name Result Flag Reference   CT CHEST WO CONTRAST (Report)     CT CHEST WITHOUT IV CONTRAST     INDICATION: PERSISTENT BROCHITIS, COUGH     COMPARISON: AP chest 7/30/2016  TECHNIQUE: CT examination of the chest was performed without intravenous contrast  Reformatted images were created in axial, sagittal, and coronal planes  Radiation dose length product (DLP) for this visit: 247 mGy-cm   This examination, like all CT scans performed in the [facility], was performed utilizing techniques to minimize radiation dose exposure, including the use of iterative    reconstruction and automated exposure control  FINDINGS:     LUNGS: No pulmonary consolidation  No endotracheal or endobronchial lesion  Areas of mild bilateral lingular and bibasilar pulmonary scarring  Calcified left upper lobe granuloma  PLEURA: Unremarkable  HEART/GREAT VESSELS: Unremarkable for patient's age  MEDIASTINUM AND FARA: Left parahilar calcified granulomas  CHEST WALL AND LOWER NECK:    Vague 24 mm hypodensity in the isthmus of the thyroid may represent a nodule  Follow-up with thyroid ultrasound  VISUALIZED STRUCTURES IN THE UPPER ABDOMEN: Unremarkable  OSSEOUS STRUCTURES: Chronic bilateral rib fractures  IMPRESSION:     No acute pulmonary findings  Prior granulomatous disease  Vague 24 mm thyroid isthmus hypodensity  Follow-up with thyroid ultrasound         Workstation performed: [id number]     Signed by:   Debra Marquez MD   12/4/17       Plan  CAP (community acquired pneumonia)    · Ciprofloxacin HCl - 500 MG Oral Tablet (Cipro); TAKE 1 TABLET TWICE DAILY  UNTIL FINISHED  CAP (community acquired pneumonia), CARROLL (dyspnea on exertion), Near syncope, SOB  (shortness of breath)    · (1) CBC/PLT/DIFF; Status:Active; Requested for:04Dec2017;    · (1) COMPREHENSIVE METABOLIC PANEL; Status:Active; Requested for:04Dec2017;    · (1) TSH; Status:Active; Requested for:04Dec2017;    · * CT CHEST WO CONTRAST; Status:Complete;   Done: 66AFY5993 02:44PM   · ECHO COMPLETE WITH CONTRAST IF INDICATED; Status:Active; Requested  for:04Dec2017;    · HOLTER MONITOR - 48 HOUR; Status:Active; Requested for:04Dec2017;    · VAS CAROTID COMPLETE STUDY; SIDE:Bilateral; Status:Active;  Requested  for:04Dec2017;   CAP (community acquired pneumonia), SOB (shortness of breath)    · Ventolin  (90 Base) MCG/ACT Inhalation Aerosol Solution; INHALE 2  PUFFS EVERY 4 HOURS AS NEEDED  CARROLL (dyspnea on exertion), Near syncope    · EKG/ECG- POC; Status:Complete;   Done: 44VVJ9070 10:49AM

## 2018-01-23 NOTE — RESULT NOTES
Verified Results  HOLTER MONITOR - 48 HOUR [id number] 07:50AM Mal Marcus   TW Order Number: [id number]    - Patient Instructions: To schedule this appointment, please contact Central Scheduling at [phone]  Test Name Result Flag Reference   HOLTER MONITOR - 48 HOUR (Report)     48 HOUR HOLTER MONITOR     INDICATION: Shortness of Breath     Average heart rate: 82 bpm    Lowest heart rate: 48 bpm   Highest heart rate: 128 bpm     VENTRICULAR ECTOPY - 0 0% of all monitored beats   Total number: 9    Longest and fastest ventricular run: 4 beats (heart rate 171 bpm)     SUPRAVENTRICULAR ECTOPY - 0 0% of all monitored beats   Total number: 38    Supraventricular Couplets: 2    Longest and fastest supraventricular run: 3 beats (heart rate 167 bpm)     BRADYCARDIA   Slowest episode: 9:10 am on D2, (minimum heart rate of 48 bpm)  This corresponded with sinus bradycardia with no evidence of heart block  TACHYCARDIA   Fastest episode: occurred at 6:09 pm on D1, (maximum heart rate of 128 bpm)  This corresponded with sinus tachycardia  SYMPTOMS   The patient experienced no significant symptoms during the monitoring time period  1) Abnormal Holter monitor of 48 hrs duration  2) Normal burden of ventricular and supraventricular ectopic beats  3) Brief runs of nonsustained ventricular and supraventricular ectopy  Interpreting Physician: Lefty Victor MD, [facility] [place]

## 2018-01-23 NOTE — RESULT NOTES
Verified Results  VAS CAROTID COMPLETE STUDY [id number] 07:44AM Ishaan Sol Order Number: [id number]    - Patient Instructions: To schedule this appointment, please contact Central Scheduling at [phone]  Test Name Result Flag Reference   VAS CAROTID COMPLETE STUDY (Report)     [facility] REPORT   CLINICAL:   Indications:   Patient presents with recent episode of fainting secondary to blood pressure   drop and recent illness  He reports being dizzy and weak from being sick  Operative History   Patients denies any cardiovascular surgeries   Risk Factors   The patient has history of HTN and hyperlipidemia  Clinical   Right Brachial Pressure: 134/80 mmHg, Left Brachial Pressure: 140/80 mmHg  FINDINGS:      Right    Impression PSV EDV (cm/s) Direction of Flow Ratio    Dist  ICA  Normal    64     24           0 62    Mid  ICA   Normal    82     28           0 80    Prox  ICA  Normal    67     13           0 65    Dist CCA         104     24                 Mid CCA         103     25           0 81    Prox CCA         127     31                 Ext Carotid       108     12           1 05    Prox Vert         52     17 Antegrade            Subclavian        156     11                    Left     Impression PSV EDV (cm/s) Direction of Flow Ratio    Dist  ICA  Normal    72     26           0 69    Mid  ICA   Normal    70     27           0 67    Prox  ICA  Normal    71     20           0 68    Dist CCA         116     27                 Mid CCA         104     27           0 78    Prox CCA         133     31                 Ext Carotid        99     15           0 95    Prox Vert         63     22 Antegrade            Subclavian        159      0                          CONCLUSION:   Impression   RIGHT:   There is no evidence of significant stenosis noted  Vertebral artery flow is antegrade  There is no significant subclavian artery   disease     LEFT:   There is no evidence of significant stenosis noted  Vertebral artery flow is antegrade  There is no significant subclavian artery   disease  SIGNATURE:   Electronically Signed by: Lisa Schaffer MD on 2017 08:51:10 AM     ECHO COMPLETE WITH CONTRAST IF INDICATED [id number] 06:58AM Chelsea Lucero Order Number: [id number]    - Patient Instructions: To schedule this appointment, please contact Central Scheduling at [phone]  Test Name Result Flag Reference   ECHO COMPLETE WITH CONTRAST IF INDICATED (Report)     [facility]   [facility] [place] [address] [facility]   [phone]     Transthoracic Echocardiogram   2D, M-mode, Doppler, and Color Doppler     Study date: 11-Dec-2017     Patient: Melissa Joseph   MR number: [id number]   Account number: [de-identified]   : 1945   Age: 74 years   Gender: Male   Status: Outpatient   Location: Echo lab   Height: 68 in   Weight: 172 lb   BP: 140/ 80 mmHg     Indications: Shortness of breath  Diagnoses: R06 02 - Shortness of breath     Sonographer: Zonia Webb [facility]   Referring Physician: Lino Vazquez MD   Group: [facility]   Interpreting Physician: Alfred Villalba MD     SUMMARY     LEFT VENTRICLE:   Systolic function was normal  Ejection fraction was estimated to be 63 %  There were no regional wall motion abnormalities  MITRAL VALVE:   There was trace regurgitation  TRICUSPID VALVE:   There was trace regurgitation  PULMONIC VALVE:   There was trace regurgitation  HISTORY: PRIOR HISTORY: Hypertension, Hyperlipidemia  PROCEDURE: The procedure was performed in the echo lab  This was a routine study  The transthoracic approach was used  The study included complete 2D imaging, M-mode, complete spectral Doppler, and color Doppler  The heart rate was 72 bpm,   at the start of the study  Images were obtained from the parasternal, apical, subcostal, and suprasternal notch acoustic windows  Image quality was adequate  LEFT VENTRICLE: Size was normal  Systolic function was normal  Ejection fraction was estimated to be 63 %  There were no regional wall motion abnormalities  Wall thickness was normal  There was no evidence of concentric hypertrophy  DOPPLER: Left ventricular diastolic function parameters were normal for the patient's age  RIGHT VENTRICLE: The size was normal  Systolic function was normal  Wall thickness was normal      LEFT ATRIUM: Size was normal      RIGHT ATRIUM: Size was normal      MITRAL VALVE: Valve structure was normal  There was normal leaflet separation  DOPPLER: The transmitral velocity was within the normal range  There was no evidence for stenosis  There was trace regurgitation  AORTIC VALVE: The valve was trileaflet  Leaflets exhibited normal thickness and normal cuspal separation  DOPPLER: Transaortic velocity was within the normal range  There was no evidence for stenosis  There was no significant   regurgitation  TRICUSPID VALVE: The valve structure was normal  There was normal leaflet separation  DOPPLER: The transtricuspid velocity was within the normal range  There was no evidence for stenosis  There was trace regurgitation  Pulmonary artery   systolic pressure was within the normal range  PULMONIC VALVE: Leaflets exhibited normal thickness, no calcification, and normal cuspal separation  DOPPLER: The transpulmonic velocity was within the normal range  There was trace regurgitation  PERICARDIUM: There was no pericardial effusion  The pericardium was normal in appearance  AORTA: The root exhibited normal size  SYSTEMIC VEINS: IVC: The inferior vena cava was normal in size       SYSTEM MEASUREMENT TABLES     2D   %FS: 28 24 %   Ao Diam: 3 08 cm   EDV(Teich): 54 82 ml   EF(Cube): 63 05 %   EF(Teich): 55 5 %   ESV(Cube): 17 39 ml   ESV(Teich): 24 4 ml   IVSd: 1 12 cm   LA Area: 11 72 cm2   LA Diam: 3 42 cm   LVEDV MOD A4C: 46 08 ml   LVEF MOD A4C: 62 85 %   LVESV MOD A4C: 17 12 ml   LVIDd: 3 61 cm   LVIDs: 2 59 cm   LVLd A4C: 7 16 cm   LVLs A4C: 5 92 cm   LVPWd: 0 94 cm   RA Area: 16 11 cm2   SV MOD A4C: 28 96 ml   SV(Cube): 29 68 ml   SV(Teich): 30 43 ml   rv diam: 3 83 cm     CW   TR Vmax: 2 42 m/s   TR maxP 52 mmHg     MM   TAPSE: 2 21 cm     PW   E': 0 08 m/s   E/E': 7 52   MV A Maged: 0 76 m/s   MV Dec Bradley: 2 47 m/s2   MV DecT: 236 62 ms   MV E Maged: 0 59 m/s   MV E/A Ratio: 0 77     [employer] Accredited Echocardiography Laboratory     Prepared and electronically signed by     Ely Chu MD   Signed 11-Dec-2017 08:40:45       Plan  CAP (community acquired pneumonia), SOB (shortness of breath)    · Ventolin  (90 Base) MCG/ACT Inhalation Aerosol Solution  CARROLL (dyspnea on exertion), SOB (shortness of breath)    · ProAir  (90 Base) MCG/ACT Inhalation Aerosol Solution;  Inhale 2 puffs q 4  hrs prn

## 2018-01-23 NOTE — RESULT NOTES
Verified Results  (1) COMPREHENSIVE METABOLIC PANEL [id number] 95:17JL Woody Pendleton   TW Order Number: [id number]     Test Name Result Flag Reference   GLUCOSE,RANDM 99 mg/dL     If the patient is fasting, the ADA then defines impaired fasting glucose as > 100 mg/dL and diabetes as > or equal to 123 mg/dL  Specimen collection should occur prior to Sulfasalazine administration due to the potential for falsely depressed results  Specimen collection should occur prior to Sulfapyridine administration due to the potential for falsely elevated results  SODIUM 140 mmol/L  136-145   POTASSIUM 3 9 mmol/L  3 5-5 3   CHLORIDE 105 mmol/L  100-108   CARBON DIOXIDE 27 mmol/L  21-32   ANION GAP (CALC) 8 mmol/L  4-13   BLOOD UREA NITROGEN 16 mg/dL  5-25   CREATININE 1 07 mg/dL  0 60-1 30   Standardized to IDMS reference method   CALCIUM 9 7 mg/dL  8 3-10 1   BILI, TOTAL 0 48 mg/dL  0 20-1 00   ALK PHOSPHATAS 57 U/L     ALT (SGPT) 40 U/L  12-78   Specimen collection should occur prior to Sulfasalazine and/or Sulfapyridine administration due to the potential for falsely depressed results  AST(SGOT) 20 U/L  5-45   Specimen collection should occur prior to Sulfasalazine administration due to the potential for falsely depressed results  ALBUMIN 3 3 g/dL L 3 5-5 0   TOTAL PROTEIN 7 4 g/dL  6 4-8 2   eGFR 68 ml/min/1 73sq m     National Kidney Disease Education Program recommendations are as follows:  GFR calculation is accurate only with a steady state creatinine  Chronic Kidney disease less than 60 ml/min/1 73 sq  meters  Kidney failure less than 15 ml/min/1 73 sq  meters       (1) CBC/PLT/DIFF [id number] 04:30PM Woody Pendleton   TW Order Number: [id number]     Test Name Result Flag Reference   WBC COUNT 7 60 Thousand/uL  4 31-10 16   RBC COUNT 4 84 Million/uL  3 88-5 62   HEMOGLOBIN 14 6 g/dL  12 0-17 0   HEMATOCRIT 42 6 %  36 5-49 3   MCV 88 fL  82-98   MCH 30 2 pg  26 8-34 3   MCHC 34 3 g/dL 31 4-37 4   RDW 13 1 %  11 6-15 1   MPV 10 3 fL  8 9-12 7   PLATELET COUNT 217 Thousands/uL  149-390   nRBC AUTOMATED 0 /100 WBCs     This is an appended report  These results have been appended to a previously preliminary verified report  This is an appended report  These results have been appended to a previously verified report  NEUTROPHILS - REL 57 %  43-75   LYMPHOCYTES - REL 21 %  14-44   MONOCYTES - REL 12 %  4-12   EOSINOPHILS - REL 1 %  0-6   BASOPHILS - REL 0 %  0-1   METAMYELOCYTES 6 % H 0-1   MYELOCYTES 3 % H 0-1   NEUTROPHILS ABS 4 33 Thousand/uL  1 85-7 62   LYMPHOTCYTES ABS 1 60 Thousand/uL  0 60-4 47   MONOCYTES ABS 0 91 Thousand/uL  0 00-1 22   EOSINOPHILS ABS 0 08 Thousand/uL  0 00-0 40   BASOPHILS ABS 0 00 Thousand/uL  0 00-0 10   TOTAL COUNTED      RBC MORPHOLOGY Normal     PLT ESTIMATE Adequate  Adequate     (1) TSH [id number] 04:30PM Ky Sajan    Order Number: [id number]     Test Name Result Flag Reference   TSH 0 497 uIU/mL  0 358-3 740   Patients undergoing fluorescein dye angiography may retain small amounts of fluorescein in the body for 48-72 hours post procedure  Samples containing fluorescein can produce falsely depressed TSH values  If the patient had this procedure,a specimen should be resubmitted post fluorescein clearance

## 2018-02-05 ENCOUNTER — OFFICE VISIT (OUTPATIENT)
Dept: FAMILY MEDICINE CLINIC | Facility: CLINIC | Age: 73
End: 2018-02-05
Payer: MEDICARE

## 2018-02-05 ENCOUNTER — APPOINTMENT (OUTPATIENT)
Dept: RADIOLOGY | Facility: MEDICAL CENTER | Age: 73
End: 2018-02-05
Payer: MEDICARE

## 2018-02-05 ENCOUNTER — TRANSCRIBE ORDERS (OUTPATIENT)
Dept: ADMINISTRATIVE | Facility: HOSPITAL | Age: 73
End: 2018-02-05

## 2018-02-05 VITALS
TEMPERATURE: 98.5 F | WEIGHT: 168.8 LBS | DIASTOLIC BLOOD PRESSURE: 80 MMHG | HEART RATE: 96 BPM | HEIGHT: 68 IN | BODY MASS INDEX: 25.58 KG/M2 | SYSTOLIC BLOOD PRESSURE: 170 MMHG

## 2018-02-05 DIAGNOSIS — M25.512 ACUTE PAIN OF BOTH SHOULDERS: Primary | ICD-10-CM

## 2018-02-05 DIAGNOSIS — I10 ESSENTIAL HYPERTENSION: ICD-10-CM

## 2018-02-05 DIAGNOSIS — M25.511 ACUTE PAIN OF BOTH SHOULDERS: ICD-10-CM

## 2018-02-05 DIAGNOSIS — M25.512 ACUTE PAIN OF BOTH SHOULDERS: ICD-10-CM

## 2018-02-05 DIAGNOSIS — M25.511 ACUTE PAIN OF BOTH SHOULDERS: Primary | ICD-10-CM

## 2018-02-05 DIAGNOSIS — J06.9 VIRAL UPPER RESPIRATORY ILLNESS: ICD-10-CM

## 2018-02-05 PROBLEM — L23.1 ALLERGIC CONTACT DERMATITIS DUE TO ADHESIVES: Status: ACTIVE | Noted: 2017-12-14

## 2018-02-05 PROBLEM — R93.89 ABNORMAL IMAGING OF THYROID: Status: ACTIVE | Noted: 2017-12-14

## 2018-02-05 PROBLEM — E04.2 MULTIPLE THYROID NODULES: Status: ACTIVE | Noted: 2017-12-27

## 2018-02-05 PROCEDURE — 73030 X-RAY EXAM OF SHOULDER: CPT

## 2018-02-05 PROCEDURE — 99213 OFFICE O/P EST LOW 20 MIN: CPT | Performed by: FAMILY MEDICINE

## 2018-02-05 RX ORDER — NAPROXEN SODIUM 220 MG
440 TABLET ORAL 2 TIMES DAILY WITH MEALS
Qty: 120 TABLET | Refills: 0
Start: 2018-02-05 | End: 2018-03-12 | Stop reason: ALTCHOICE

## 2018-02-05 RX ORDER — CYCLOSPORINE 0.5 MG/ML
EMULSION OPHTHALMIC
COMMUNITY
Start: 2017-11-09

## 2018-02-05 RX ORDER — MOMETASONE FUROATE 1 MG/G
CREAM TOPICAL
COMMUNITY
Start: 2017-12-14 | End: 2018-03-12 | Stop reason: ALTCHOICE

## 2018-02-05 NOTE — ASSESSMENT & PLAN NOTE
Blood pressure is certainly elevated today  I wonder if this is secondary to the pain and discomfort that he is currently having, or if there is more going on  At the moment I will not make any adjustments, but I would recommend re-evaluate in approximately 1 month

## 2018-02-05 NOTE — ASSESSMENT & PLAN NOTE
Patient is a significant amount of discomfort and pain in the shoulders, especially the right shoulder  At this point, recommend physical therapy, Aleve 2 pills twice a day, and x-ray of the shoulder  Possibilities include strain/sprain, fracture, rotator cuff tear

## 2018-02-05 NOTE — PROGRESS NOTES
Assessment/Plan:    Acute pain of both shoulders  Patient is a significant amount of discomfort and pain in the shoulders, especially the right shoulder  At this point, recommend physical therapy, Aleve 2 pills twice a day, and x-ray of the shoulder  Possibilities include strain/sprain, fracture, rotator cuff tear  Hypertension  Blood pressure is certainly elevated today  I wonder if this is secondary to the pain and discomfort that he is currently having, or if there is more going on  At the moment I will not make any adjustments, but I would recommend re-evaluate in approximately 1 month  Viral upper respiratory illness  Symptomatic treatment with current over-the-counters  Diagnoses and all orders for this visit:    Acute pain of both shoulders  -     Ambulatory referral to Physical Therapy; Future  -     XR shoulder 2+ vw right; Future  -     naproxen sodium (ALEVE) 220 MG tablet; Take 2 tablets (440 mg total) by mouth 2 (two) times a day with meals for 30 days    Essential hypertension    Viral upper respiratory illness    Other orders  -     RESTASIS 0 05 % ophthalmic emulsion;   -     mometasone (ELOCON) 0 1 % cream;   -     pneumococcal 13-valent conjugate vaccine (PREVNAR 13); Inject into the shoulder, thigh, or buttocks          Subjective:      Patient ID: Jaymie Zepeda is a 74 y o  male  CC:  Head cold, sore throat, chest congestion, cough  Onset 1 week ago  Also c/o bilat  Shoulder pain s/p skiing accident in December  Worse at night  Pain radiates down arms and also has tingling in hands  Starts in throat, and then goes into chest   Phlegm and rattles in lungs  It is getting a bit better, but not resolves  Shoulders: He fell skiing  He was at [place]  He hit wet snow from  gun, stopped quick and was launched into air  His chest with the poles, and shoulder started to hurt 1 week later  Right is significant, left is Ok    He could not sleep on his sides  He has some tingling in hands bilaterally  Stretching helped a bit, but he can't get full extension in the Am  Used Aleve, 3 a day, for 10 days  Didn't complete  He had pain in shoulder last week after trying to open a bottle cap  The following portions of the patient's history were reviewed and updated as appropriate: allergies, current medications, past family history, past medical history, past social history, past surgical history and problem list     Review of Systems   Constitutional: Negative for chills, diaphoresis, fatigue and fever  HENT: Negative for congestion, sinus pressure and sore throat  Respiratory: Positive for cough  Negative for wheezing  Cardiovascular: Negative  Gastrointestinal: Negative  Musculoskeletal:        Per HPI         Objective:    Vitals:    02/05/18 1024 02/05/18 1056   BP: 170/90 170/80   BP Location: Left arm    Patient Position: Sitting    Cuff Size: Standard    Pulse: 96    Temp: 98 5 °F (36 9 °C)    TempSrc: Tympanic    Weight: 76 6 kg (168 lb 12 8 oz)    Height: 5' 8" (1 727 m)       Physical Exam   Constitutional: He appears well-developed and well-nourished  HENT:   Head: Normocephalic and atraumatic  Right Ear: External ear normal    Left Ear: External ear normal    Neck: Normal range of motion  Neck supple  Cardiovascular: Normal rate, regular rhythm and normal heart sounds  Exam reveals no gallop and no friction rub  No murmur heard  Pulmonary/Chest: Effort normal and breath sounds normal  No respiratory distress  He has no wheezes  He has no rales  He exhibits no tenderness  Musculoskeletal:        Right shoulder: He exhibits decreased range of motion (Decreased range of motion secondary to significant tenderness with range of motion  Patient is eventually able to get his arm to do external and internal rotation, but not without significant discomfort), tenderness (Discomfort with range of motion only    Palpation did not provoke any tenderness ) and pain (Discomfort and pain with range of motion  )  He exhibits no bony tenderness, no swelling, no effusion, no crepitus, no deformity, no laceration, no spasm and normal strength          Left shoulder: Normal

## 2018-02-05 NOTE — PATIENT INSTRUCTIONS
Problem List Items Addressed This Visit     Hypertension     Blood pressure is certainly elevated today  I wonder if this is secondary to the pain and discomfort that he is currently having, or if there is more going on  At the moment I will not make any adjustments, but I would recommend re-evaluate in approximately 1 month  Acute pain of both shoulders - Primary     Patient is a significant amount of discomfort and pain in the shoulders, especially the right shoulder  At this point, recommend physical therapy, Aleve 2 pills twice a day, and x-ray of the shoulder  Possibilities include strain/sprain, fracture, rotator cuff tear  Relevant Medications    naproxen sodium (ALEVE) 220 MG tablet    Other Relevant Orders    Ambulatory referral to Physical Therapy    XR shoulder 2+ vw right    Viral upper respiratory illness     Symptomatic treatment with current over-the-counters

## 2018-02-08 ENCOUNTER — TELEPHONE (OUTPATIENT)
Dept: FAMILY MEDICINE CLINIC | Facility: CLINIC | Age: 73
End: 2018-02-08

## 2018-02-08 NOTE — TELEPHONE ENCOUNTER
Patient would like to be called with results of XRAY that he had done earlier in the week and he also wanted the Dr to know that at his last appointment it was noted that his BP was high but he realized once he got home he did not take his BP medication that day  He also said that his BP on Tuesday was 140/75 A M   And 130/70 PM

## 2018-02-12 ENCOUNTER — OFFICE VISIT (OUTPATIENT)
Dept: FAMILY MEDICINE CLINIC | Facility: CLINIC | Age: 73
End: 2018-02-12
Payer: MEDICARE

## 2018-02-12 VITALS
TEMPERATURE: 98.4 F | HEART RATE: 80 BPM | BODY MASS INDEX: 25.46 KG/M2 | DIASTOLIC BLOOD PRESSURE: 84 MMHG | SYSTOLIC BLOOD PRESSURE: 168 MMHG | WEIGHT: 168 LBS | HEIGHT: 68 IN

## 2018-02-12 DIAGNOSIS — I10 ESSENTIAL HYPERTENSION: ICD-10-CM

## 2018-02-12 DIAGNOSIS — J20.9 ACUTE BRONCHITIS, UNSPECIFIED ORGANISM: Primary | ICD-10-CM

## 2018-02-12 PROCEDURE — 99213 OFFICE O/P EST LOW 20 MIN: CPT | Performed by: FAMILY MEDICINE

## 2018-02-12 PROCEDURE — 94640 AIRWAY INHALATION TREATMENT: CPT | Performed by: FAMILY MEDICINE

## 2018-02-12 RX ORDER — ALBUTEROL SULFATE 2.5 MG/3ML
2.5 SOLUTION RESPIRATORY (INHALATION) ONCE
Status: COMPLETED | OUTPATIENT
Start: 2018-02-12 | End: 2018-02-12

## 2018-02-12 RX ORDER — AZITHROMYCIN 250 MG/1
TABLET, FILM COATED ORAL
Qty: 6 TABLET | Refills: 0 | Status: SHIPPED | OUTPATIENT
Start: 2018-02-12 | End: 2018-02-17

## 2018-02-12 RX ADMIN — ALBUTEROL SULFATE 2.5 MG: 2.5 SOLUTION RESPIRATORY (INHALATION) at 11:04

## 2018-02-12 NOTE — ASSESSMENT & PLAN NOTE
Patient has higher blood pressures with his most recent symptoms, so I would recommend that we recheck once he is feeling better  I would not make any adjustments to medications at the moment

## 2018-02-12 NOTE — PROGRESS NOTES
Assessment/Plan:    Hypertension  Patient has higher blood pressures with his most recent symptoms, so I would recommend that we recheck once he is feeling better  I would not make any adjustments to medications at the moment  Diagnoses and all orders for this visit:    Acute bronchitis, unspecified organism  Comments:  Z-Jeffery, Ventolin inhaler that he has at home already, 2 puffs every 4 hours while awake  Follow-up in 2 weeks if needed  Orders:  -     Mini neb  -     albuterol inhalation solution 2 5 mg; Take 3 mL (2 5 mg total) by nebulization once   -     azithromycin (ZITHROMAX) 250 mg tablet; Take 2 tablets on day 1, then 1 tablet daily days 2 through 5    Essential hypertension          Subjective:      Patient ID: Derrick Mireles is a 74 y o  male  He is continuing to have a significant amount of tightness and cough at night  In the mornings he feels okay, but in the afternoon evening he is worse with things  Most of the symptoms are significant coughing, starting at night  During the day, he does have some phlegm and production of cough, but not nearly as much as at bedtime  He is not currently using medications for this  At the last visit, we diagnosed viral infection, and did not start medications at that time as most of the symptoms are related to his shoulder problems  Chief Complaint   Patient presents with    Follow-up    Cough     Worse at night       The following portions of the patient's history were reviewed and updated as appropriate: allergies, current medications, past family history, past social history, past surgical history and problem list     Review of Systems      Objective:    Vitals:    02/12/18 1006   BP: 168/84   Pulse: 80   Temp: 98 4 °F (36 9 °C)     Vitals:    02/12/18 1006   BP: 168/84   Pulse: 80   Temp: 98 4 °F (36 9 °C)   Weight: 76 2 kg (168 lb)   Height: 5' 8" (1 727 m)          Mini neb     Date/Time 2/12/2018 5:41 PM     Performed by Anirudh Pompa by DEWAYNE Cooper       Consent: Verbal consent obtained  Consent given by: patient  Patient understanding: patient states understanding of the procedure being performed  Patient consent: the patient's understanding of the procedure matches consent given      Comments: Neb performed by staff  Minimal changes afterward  Physical Exam   Constitutional: He appears well-developed and well-nourished  HENT:   Head: Normocephalic and atraumatic  Pulmonary/Chest: Effort normal and breath sounds normal  He has no wheezes  He has no rales  He exhibits no tenderness  Patient was somewhat tentative in his deep breathing  This was as he did not wish to start coughing again

## 2018-02-12 NOTE — PATIENT INSTRUCTIONS
Problem List Items Addressed This Visit     Hypertension     Patient has higher blood pressures with his most recent symptoms, so I would recommend that we recheck once he is feeling better  I would not make any adjustments to medications at the moment  Other Visit Diagnoses     Acute bronchitis, unspecified organism    -  Primary    Z-Jeffery, Ventolin inhaler that he has at home already, 2 puffs every 4 hours while awake  Follow-up in 2 weeks if needed      Relevant Medications    albuterol inhalation solution 2 5 mg    azithromycin (ZITHROMAX) 250 mg tablet    Other Relevant Orders    Mini neb

## 2018-02-26 ENCOUNTER — EVALUATION (OUTPATIENT)
Dept: PHYSICAL THERAPY | Facility: MEDICAL CENTER | Age: 73
End: 2018-02-26
Payer: MEDICARE

## 2018-02-26 DIAGNOSIS — M25.511 ACUTE PAIN OF BOTH SHOULDERS: Primary | ICD-10-CM

## 2018-02-26 DIAGNOSIS — M25.512 ACUTE PAIN OF BOTH SHOULDERS: Primary | ICD-10-CM

## 2018-02-26 PROCEDURE — 97161 PT EVAL LOW COMPLEX 20 MIN: CPT | Performed by: PHYSICAL THERAPIST

## 2018-02-26 PROCEDURE — G8990 OTHER PT/OT CURRENT STATUS: HCPCS | Performed by: PHYSICAL THERAPIST

## 2018-02-26 PROCEDURE — G8991 OTHER PT/OT GOAL STATUS: HCPCS | Performed by: PHYSICAL THERAPIST

## 2018-02-26 NOTE — PROGRESS NOTES
PT Evaluation     Today's date: 2018  Patient name: Laura Biswas  : 1945  MRN: [id number]  Referring provider: Nasrin Cochran MD  Dx: No diagnosis found  Assessment  Impairments: abnormal muscle firing, abnormal or restricted ROM, impaired physical strength and pain with function    Assessment details: Laura Biswas is a 74 y o  male presents with B shoulder pain 2* falling while skiing  Laura Biswas has the above listed impairments and will benefit from skilled PT to improve deficits to return to prior level of function  Understanding of Dx/Px/POC: good   Prognosis: good    Goals  Impairment Goals  - Decrease pain to 0-2/10  - Improve ROM equal to [facility]  - Increase strength equal to 5/5 B UE    Functional Goals  - Increase Functional Status Measure to: 79  - Patient will be independent with comprehensive HEP  - Patient will be able to sleep without waking 2* pain  - Patient will be able to reach for object overhead  - Patient will be able to lift/carry objects without provocation of symptoms        Plan  Patient would benefit from: skilled PT  Referral necessary: No  Planned therapy interventions: home exercise program, manual therapy, neuromuscular re-education, patient education, functional ROM exercises, strengthening, stretching and joint mobilization  Frequency: 2x week  Duration in weeks: 12  Treatment plan discussed with: patient        Subjective Evaluation    History of Present Illness  Date of onset: 2017  Mechanism of injury: trauma  Mechanism of injury: Jared Garrett reports he was skiing at the end of December when he hit a freshly blown pack of wet snow that sent him flying into the air and he fell face down with 1 ski pole wrapped under his left shoulder against his ribs and R UE was overhead  Patient had immediate onset of pain in ribs on left side  Approx 1 week later he started having B shoulder pain   Rib pain resolved after approx 2 weeks, shoulder pain continued along with intermittent radiating pain in B UE's and paresthesias  Denies pain in neck  Catrachita Brownlee went to PCP 3 weeks ago and was referred to PT  Currently pain is worst at night and upon wakening  Not a recurrent problem   Pain  Current pain ratin  At best pain ratin  At worst pain ratin  Aggravating factors: overhead activity and lifting  Progression: no change      Diagnostic Tests  X-ray: normal        Objective     Tenderness     Left Shoulder   No tenderness     Right Shoulder  No tenderness     Cervical/Thoracic Screen   Cervical range of motion within normal limits    Active Range of Motion   Left Shoulder   Flexion: 165 degrees   External rotation BTH: C3 with pain  Internal rotation BTB: L5 with pain    Right Shoulder   Flexion: 165 degrees   External rotation BTH: C3 with pain  Internal rotation BTB: L5 with pain    Passive Range of Motion   Left Shoulder   Flexion: 165 degrees   Abduction: 170 degrees   External rotation 45°: 50 degrees   Internal rotation 90°: 55 degrees     Right Shoulder   Flexion: 165 degrees   Abduction: 165 degrees   External rotation 45°: 55 degrees   Internal rotation 90°: 60 degrees     Joint Play   Left Shoulder  Hypomobile in the posterior capsule and inferior capsule  Right Shoulder  Hypomobile in the posterior capsule and inferior capsule       Strength/Myotome Testing     Left Shoulder     Planes of Motion   Flexion: 4   Extension: 4   External rotation at 90°: 4-   Internal rotation at 90°: 4     Isolated Muscles   Infraspinatus: 4-   Lower trapezius: 3+   Middle trapezius: 3+   Supraspinatus: 4     Right Shoulder     Planes of Motion   Flexion: 4-   Extension: 4   External rotation at 90°: 4-   Internal rotation at 90°: 4     Isolated Muscles   Infraspinatus: 4-   Lower trapezius: 3+   Middle trapezius: 3+   Supraspinatus: 4     Tests     Left Shoulder   Negative AC shear, active compression ([place]), anterior slide, posterior load and shift and anterior slide   Right Shoulder   Negative AC shear, active compression ([place]), anterior slide, posterior load and shift and anterior slide              Precautions: HTN    Daily Treatment Diary     Manual  2/26            [facility] mobs                                                                     Exercise Diary              Serratus punch             Prone rows             Prone ext                                                                                                                                                                                                                                              Modalities

## 2018-03-02 ENCOUNTER — OFFICE VISIT (OUTPATIENT)
Dept: PHYSICAL THERAPY | Facility: MEDICAL CENTER | Age: 73
End: 2018-03-02
Payer: MEDICARE

## 2018-03-02 DIAGNOSIS — M25.511 ACUTE PAIN OF BOTH SHOULDERS: Primary | ICD-10-CM

## 2018-03-02 DIAGNOSIS — M25.512 ACUTE PAIN OF BOTH SHOULDERS: Primary | ICD-10-CM

## 2018-03-02 PROCEDURE — 97140 MANUAL THERAPY 1/> REGIONS: CPT | Performed by: PHYSICAL THERAPIST

## 2018-03-02 PROCEDURE — 97110 THERAPEUTIC EXERCISES: CPT | Performed by: PHYSICAL THERAPIST

## 2018-03-02 PROCEDURE — 97112 NEUROMUSCULAR REEDUCATION: CPT | Performed by: PHYSICAL THERAPIST

## 2018-03-02 NOTE — PROGRESS NOTES
Daily Note     Today's date: 3/2/2018  Patient name: Dave Jarrett  : 1945  MRN: [id number]  Referring provider: Leo Burger MD  Dx:   Encounter Diagnosis     ICD-10-CM    1  Acute pain of both shoulders M25 511     M25 512                   Subjective: pt reported he had more pain last night while sleeping      Objective: See treatment diary below      Assessment: Tolerated treatment well  Patient would benefit from continued PT      Plan: Continue per plan of care       Precautions: HTN    Daily Treatment Diary     Manual  2/26 3/2           [facility] gr III inf, post  5'           PROM  5'                                                      Exercise Diary              Serratus punch  10           Prone rows  10           Prone ext  10           Shoulder ER SL  10           Shoulder flex wand aarom  20           Table slides flex  10                                                                                                                                                                                                     Modalities

## 2018-03-05 ENCOUNTER — OFFICE VISIT (OUTPATIENT)
Dept: PHYSICAL THERAPY | Facility: MEDICAL CENTER | Age: 73
End: 2018-03-05
Payer: MEDICARE

## 2018-03-05 DIAGNOSIS — M25.511 ACUTE PAIN OF BOTH SHOULDERS: Primary | ICD-10-CM

## 2018-03-05 DIAGNOSIS — M25.512 ACUTE PAIN OF BOTH SHOULDERS: Primary | ICD-10-CM

## 2018-03-05 PROCEDURE — 97140 MANUAL THERAPY 1/> REGIONS: CPT | Performed by: PHYSICAL THERAPIST

## 2018-03-05 PROCEDURE — 97110 THERAPEUTIC EXERCISES: CPT | Performed by: PHYSICAL THERAPIST

## 2018-03-05 PROCEDURE — 97112 NEUROMUSCULAR REEDUCATION: CPT | Performed by: PHYSICAL THERAPIST

## 2018-03-07 ENCOUNTER — APPOINTMENT (OUTPATIENT)
Dept: PHYSICAL THERAPY | Facility: MEDICAL CENTER | Age: 73
End: 2018-03-07
Payer: MEDICARE

## 2018-03-08 ENCOUNTER — OFFICE VISIT (OUTPATIENT)
Dept: PHYSICAL THERAPY | Facility: MEDICAL CENTER | Age: 73
End: 2018-03-08
Payer: MEDICARE

## 2018-03-08 DIAGNOSIS — M25.511 ACUTE PAIN OF BOTH SHOULDERS: Primary | ICD-10-CM

## 2018-03-08 DIAGNOSIS — M25.512 ACUTE PAIN OF BOTH SHOULDERS: Primary | ICD-10-CM

## 2018-03-08 PROCEDURE — 97140 MANUAL THERAPY 1/> REGIONS: CPT

## 2018-03-08 PROCEDURE — 97112 NEUROMUSCULAR REEDUCATION: CPT

## 2018-03-08 PROCEDURE — 97110 THERAPEUTIC EXERCISES: CPT

## 2018-03-08 NOTE — PROGRESS NOTES
Daily Note     Today's date: 3/8/2018  Patient name: Cecily Guerra  : 1945  MRN: [id number]  Referring provider: Charu Díaz MD  Dx:   Encounter Diagnosis     ICD-10-CM    1  Acute pain of both shoulders M25 511     M25 512                   Subjective: Pt reports that he feels his L shoulder is progressing but he continues to have R shoulder pain which is worse at times  Pt states that he has an appointment with his family doctor on Monday and he is feeling like he needs to have an MRI or injection to his R shoulder  Objective: See treatment diary below      Assessment: Tolerated treatment well  Patient exhibited good technique with therapeutic exercises  Added ER with red tband  Pt performed ex without an increase in his baseline pain  Plan: Progress treatment as tolerated        Precautions: HTN    Daily Treatment Diary     Manual  2/26 3/2 3/5 3/8         [facility] mob[facility] gr III inf, post  5' 5'          PROM  5' 5' x15'                                                    Exercise Diary              Serratus punch  10 x20 x20         Prone rows  10 x20 x20         Prone ext  10 x20 x20         Shoulder ER SL  10 x20 x20         Shoulder flex wand aarom supine  20 x20 x20         Table slides flex stand  10 x20 x20         Shoulder ext TB   Rx20 Rx20         Rows TB   Rx20 Rx20         IR TB   R x20 Rx20         ER TB   R NV Rx20                                                                                                                                               Modalities                           Cold pack R shoulder    10'

## 2018-03-12 ENCOUNTER — OFFICE VISIT (OUTPATIENT)
Dept: FAMILY MEDICINE CLINIC | Facility: CLINIC | Age: 73
End: 2018-03-12
Payer: MEDICARE

## 2018-03-12 ENCOUNTER — OFFICE VISIT (OUTPATIENT)
Dept: PHYSICAL THERAPY | Facility: MEDICAL CENTER | Age: 73
End: 2018-03-12
Payer: MEDICARE

## 2018-03-12 VITALS
WEIGHT: 165.4 LBS | BODY MASS INDEX: 25.07 KG/M2 | SYSTOLIC BLOOD PRESSURE: 148 MMHG | HEIGHT: 68 IN | HEART RATE: 84 BPM | DIASTOLIC BLOOD PRESSURE: 78 MMHG

## 2018-03-12 DIAGNOSIS — Z23 NEED FOR PNEUMOCOCCAL VACCINATION: ICD-10-CM

## 2018-03-12 DIAGNOSIS — M25.511 ACUTE PAIN OF BOTH SHOULDERS: Primary | ICD-10-CM

## 2018-03-12 DIAGNOSIS — M25.512 ACUTE PAIN OF BOTH SHOULDERS: Primary | ICD-10-CM

## 2018-03-12 DIAGNOSIS — I10 ESSENTIAL HYPERTENSION: ICD-10-CM

## 2018-03-12 PROCEDURE — G0009 ADMIN PNEUMOCOCCAL VACCINE: HCPCS

## 2018-03-12 PROCEDURE — 97140 MANUAL THERAPY 1/> REGIONS: CPT | Performed by: PHYSICAL THERAPIST

## 2018-03-12 PROCEDURE — 99213 OFFICE O/P EST LOW 20 MIN: CPT | Performed by: FAMILY MEDICINE

## 2018-03-12 PROCEDURE — 90670 PCV13 VACCINE IM: CPT

## 2018-03-12 PROCEDURE — 97110 THERAPEUTIC EXERCISES: CPT | Performed by: PHYSICAL THERAPIST

## 2018-03-12 NOTE — ASSESSMENT & PLAN NOTE
He is currently taking 1-1/2 tablets daily of 25 mg Lopressor  Would recommend that we increase to 2 tablets daily  Re-evaluate in 1 month after that  Continue with the hydrochlorothiazide at 12 5 mg daily

## 2018-03-12 NOTE — PATIENT INSTRUCTIONS
Problem List Items Addressed This Visit     Hypertension       He is currently taking 1-1/2 tablets daily of 25 mg Lopressor  Would recommend that we increase to 2 tablets daily  Re-evaluate in 1 month after that  Continue with the hydrochlorothiazide at 12 5 mg daily  Relevant Medications    metoprolol tartrate (LOPRESSOR) 25 mg tablet    Acute pain of both shoulders - Primary       At this point, the patient has failed conservative treatment with physical therapy  Recommend MRI, and consider orthopedic follow up after that           Relevant Orders    MRI shoulder right wo contrast      Other Visit Diagnoses     Need for pneumococcal vaccination        Relevant Orders    PNEUMOCOCCAL CONJUGATE VACCINE 13-VALENT GREATER THAN 6 MONTHS (Completed)

## 2018-03-12 NOTE — PROGRESS NOTES
Assessment and Plan:    Problem List Items Addressed This Visit     Hypertension       He is currently taking 1-1/2 tablets daily of 25 mg Lopressor  Would recommend that we increase to 2 tablets daily  Re-evaluate in 1 month after that  Continue with the hydrochlorothiazide at 12 5 mg daily  Relevant Medications    metoprolol tartrate (LOPRESSOR) 25 mg tablet    Acute pain of both shoulders - Primary       At this point, the patient has failed conservative treatment with physical therapy  Recommend MRI, and consider orthopedic follow up after that  Relevant Orders    MRI shoulder right wo contrast      Other Visit Diagnoses     Need for pneumococcal vaccination        Relevant Orders    PNEUMOCOCCAL CONJUGATE VACCINE 13-VALENT GREATER THAN 6 MONTHS (Completed)             Diagnoses and all orders for this visit:    Acute pain of both shoulders  -     MRI shoulder right wo contrast; Future    Essential hypertension  -     metoprolol tartrate (LOPRESSOR) 25 mg tablet; Take 1 tablet (25 mg total) by mouth 2 (two) times a day for 30 days    Need for pneumococcal vaccination  -     PNEUMOCOCCAL CONJUGATE VACCINE 13-VALENT GREATER THAN 6 MONTHS              Subjective:      Patient ID: Esme Rogers is a 74 y o  male  CC:Follow up to high BP and right shoulder pain  mjs    No chief complaint on file  HPI:    Patient did mention that the left shoulder is better after 1 month of Aleve, but after stopping the Aleve it seems to have some pain increasing a bit  He   Did start physical therapy  his right shoulder still has significant issues  This is despite doing physical therapy on the right shoulder as well  Patient does have increased blood pressure recently  He did mention that he has increased stress  Wife has Parkinson's,   And also has recent surgery          The following portions of the patient's history were reviewed and updated as appropriate: allergies, current medications and problem list       Review of Systems   Constitutional: Negative  HENT: Negative  Musculoskeletal:        Per HPI   All other systems reviewed and are negative  Data to review:       Objective:  Vitals:    03/12/18 1345   BP: 148/78   Pulse: 84   Weight: 75 kg (165 lb 6 4 oz)   Height: 5' 8" (1 727 m)     Vitals:    03/12/18 1345   BP: 148/78   Pulse: 84   Weight: 75 kg (165 lb 6 4 oz)   Height: 5' 8" (1 727 m)        Physical Exam   Constitutional: He appears well-developed and well-nourished  HENT:   Head: Normocephalic  Nursing note and vitals reviewed

## 2018-03-12 NOTE — PROGRESS NOTES
Daily Note     Today's date: 3/12/2018  Patient name: Susana Palmer  : 1945  MRN: [id number]  Referring provider: Vannesa Harry MD  Dx:   Encounter Diagnosis     ICD-10-CM    1  Acute pain of both shoulders M25 511     M25 512        Start Time: 1500  Stop Time: 1555  Total time in clinic (min): 55 minutes    Subjective: pt reported his family physician is going to order an MRI for R shoulder      Objective: See treatment diary below      Assessment: Tolerated treatment well  Patient improved ROM after joint mobs      Plan: Progress treatment as tolerated      Precautions: HTN    Daily Treatment Diary     Manual  2/26 3/2 3/5 3/8 3/12        [address] mobs gr III inf, post  5' 5'  5'        PROM  5' 5' x15' 10'                                                   Exercise Diary              Serratus punch  10 x20 x20 20        Prone rows  10 x20 x20 20        Prone ext  10 x20 x20 20        Shoulder ER SL  10 x20 x20 20        Shoulder flex wand aarom supine  20 x20 x20 20        Table slides flex stand  10 x20 x20 20        Shoulder ext TB   Rx20 Rx20 G 20        Rows TB   Rx20 Rx20 G 20        IR TB   R x20 Rx20 R 20        ER TB   R NV Rx20 R 20        Flex AROM     20        Scaption AROM     10                                                                                                                    Modalities                           Cold pack R shoulder    10'

## 2018-03-12 NOTE — ASSESSMENT & PLAN NOTE
At this point, the patient has failed conservative treatment with physical therapy  Recommend MRI, and consider orthopedic follow up after that

## 2018-03-14 ENCOUNTER — OFFICE VISIT (OUTPATIENT)
Dept: PHYSICAL THERAPY | Facility: MEDICAL CENTER | Age: 73
End: 2018-03-14
Payer: MEDICARE

## 2018-03-14 ENCOUNTER — OFFICE VISIT (OUTPATIENT)
Dept: ENDOCRINOLOGY | Facility: CLINIC | Age: 73
End: 2018-03-14
Payer: MEDICARE

## 2018-03-14 VITALS
SYSTOLIC BLOOD PRESSURE: 142 MMHG | HEART RATE: 73 BPM | HEIGHT: 68 IN | BODY MASS INDEX: 24.96 KG/M2 | DIASTOLIC BLOOD PRESSURE: 90 MMHG | WEIGHT: 164.7 LBS

## 2018-03-14 DIAGNOSIS — M25.511 ACUTE PAIN OF BOTH SHOULDERS: Primary | ICD-10-CM

## 2018-03-14 DIAGNOSIS — M25.512 ACUTE PAIN OF BOTH SHOULDERS: Primary | ICD-10-CM

## 2018-03-14 DIAGNOSIS — E04.2 MULTIPLE THYROID NODULES: Primary | ICD-10-CM

## 2018-03-14 PROCEDURE — 97140 MANUAL THERAPY 1/> REGIONS: CPT | Performed by: PHYSICAL THERAPIST

## 2018-03-14 PROCEDURE — 99204 OFFICE O/P NEW MOD 45 MIN: CPT | Performed by: INTERNAL MEDICINE

## 2018-03-14 PROCEDURE — 97110 THERAPEUTIC EXERCISES: CPT | Performed by: PHYSICAL THERAPIST

## 2018-03-14 PROCEDURE — 97112 NEUROMUSCULAR REEDUCATION: CPT | Performed by: PHYSICAL THERAPIST

## 2018-03-14 RX ORDER — ASCORBIC ACID 500 MG
500 TABLET ORAL DAILY
COMMUNITY

## 2018-03-14 RX ORDER — MELATONIN 10 MG
2000 TABLET, SUBLINGUAL SUBLINGUAL
COMMUNITY

## 2018-03-14 NOTE — ASSESSMENT & PLAN NOTE
thyroid ultrasound reviewed with the patient  He has multiple thyroid nodules and 2 of the nodules meet criteria for fine-needle aspiration biopsy-will set up for sonogram guided fine-needle aspiration biopsy of thyroid nodules    Thyroid function tests within normal  Further management will depend on the results of the biopsy

## 2018-03-14 NOTE — PATIENT INSTRUCTIONS
Thyroid Nodules   WHAT YOU NEED TO KNOW:   Thyroid nodules are growths on your thyroid gland  Your thyroid makes hormones that help control your body temperature, heart rate, and growth  The hormones also control how fast your body uses food for energy  Some nodules are lumps of tissue, and others are filled with fluid  DISCHARGE INSTRUCTIONS:   Medicines:   · Thyroid medicine  is given to bring your thyroid hormone levels back to a normal range  · Take your medicine as directed  Contact your healthcare provider if you think your medicine is not helping or if you have side effects  Tell him or her if you are allergic to any medicine  Keep a list of the medicines, vitamins, and herbs you take  Include the amounts, and when and why you take them  Bring the list or the pill bottles to follow-up visits  Carry your medicine list with you in case of an emergency  Follow up with your healthcare provider as directed: You may need frequent blood tests to check your thyroid hormone levels  You may also need tests such as an ultrasound to check if any nodules are growing or have returned  Write down your questions so you remember to ask them during your visits  Eat iodine-rich foods:  Examples include fish, seaweed, dairy products, eggs, beans, and lean meat  Iodized salt also contains iodine  You may need to use iodized table salt when you cook and season your food  Iodine may be added to bread or to your drinking water  Ask for a list of foods that contain iodine, and ask how much iodine you need each day  Contact your healthcare provider if:   · You have a new cough that does not improve  · You begin choking or have new or increased trouble swallowing  · Your voice becomes hoarse  · You are losing weight without trying  · You have questions or concerns about your condition or care  Return to the emergency department if:   · You have sudden chest pain or trouble breathing      · Your symptoms worsen, even after you take your medicines  © 2017 [employer] Information is for End User's use only and may not be sold, redistributed or otherwise used for commercial purposes  All illustrations and images included in CareNotes® are the copyrighted property of A D [employer] M [employer] Inc  or [employer]  The above information is an  only  It is not intended as medical advice for individual conditions or treatments  Talk to your doctor, nurse or pharmacist before following any medical regimen to see if it is safe and effective for you

## 2018-03-14 NOTE — LETTER
March 14, 2018     Hiram Goel MD  [address] [place] [id number]    Patient: Amanda Mcnally   YOB: 1945   Date of Visit: 3/14/2018       Dear Dr Zafar Alston: Thank you for referring Robert Burgos to me for evaluation  Below are my notes for this consultation  If you have questions, please do not hesitate to call me  I look forward to following your patient along with you  Sincerely,        Rachel Ponce MD        CC: No Recipients  Rachel Ponce MD  3/14/2018  1:45 PM  Signed   Amanda Mcnally 74 y o  male MRN: [id number]    Encounter: [id number]      Assessment/Plan     Problem List Items Addressed This Visit     Multiple thyroid nodules - Primary      thyroid ultrasound reviewed with the patient  He has multiple thyroid nodules and 2 of the nodules meet criteria for fine-needle aspiration biopsy-will set up for sonogram guided fine-needle aspiration biopsy of thyroid nodules  Thyroid function tests within normal  Further management will depend on the results of the biopsy         Relevant Orders    US guided thyroid biopsy        CC:   Thyroid nodules    History of Present Illness     HPI:  70-year-old gentleman referred here for evaluation of thyroid nodules  He initially had a CT chest in December which showed isthmus nodule and thus he underwent a thyroid ultrasound to better delineate the thyroid nodules  Thyroid ultrasound showed multiple thyroid nodules  No obstructive symptoms ,   No FH of thyroid cancer , no h/o RT to neck       Review of Systems   Constitutional: Negative for fatigue and unexpected weight change  Respiratory: Negative for cough and shortness of breath  Cardiovascular: Negative for chest pain, palpitations and leg swelling  Gastrointestinal: Negative for constipation, diarrhea, nausea and vomiting  Musculoskeletal: Positive for arthralgias  Negative for gait problem  Skin: Negative for color change and pallor  Psychiatric/Behavioral: Negative for sleep disturbance  The patient is not nervous/anxious  All other systems reviewed and are negative  Historical Information   Past Medical History:   Diagnosis Date    Closed fracture of one rib     Last Assessed:  10/20/13    Hyperlipidemia     Hypertension      Past Surgical History:   Procedure Laterality Date    NO PAST SURGERIES      TONSILLECTOMY       Social History   History   Alcohol Use No     Comment: As per [employer]:  Social drinker     History   Drug Use No     History   Smoking Status    Never Smoker   Smokeless Tobacco    Never Used     Family History:   Family History   Problem Relation Age of Onset    Stroke Mother     Alcohol abuse Father        Meds/Allergies   Current Outpatient Prescriptions   Medication Sig Dispense Refill    ascorbic acid (VITAMIN C) 500 mg tablet Take 500 mg by mouth daily      aspirin 81 MG tablet Take 81 mg by mouth daily   Cholecalciferol (VITAMIN D3) 89000 units TABS Take by mouth      hydrochlorothiazide (MICROZIDE) 12 5 mg capsule Take 12 5 mg by mouth daily   metoprolol tartrate (LOPRESSOR) 25 mg tablet Take 1 tablet (25 mg total) by mouth 2 (two) times a day for 30 days 60 tablet 0    RESTASIS 0 05 % ophthalmic emulsion       pneumococcal 13-valent conjugate vaccine (PREVNAR 13) Inject into the shoulder, thigh, or buttocks       No current facility-administered medications for this visit  No Known Allergies    Objective   Vitals: Blood pressure 142/90, pulse 73, height 5' 8" (1 727 m), weight 74 7 kg (164 lb 11 2 oz)  Physical Exam   Constitutional: He is oriented to person, place, and time  He appears well-developed and well-nourished  No distress  HENT:   Head: Normocephalic and atraumatic  Mouth/Throat: No oropharyngeal exudate  Eyes: Conjunctivae and EOM are normal  No scleral icterus  Neck: Normal range of motion  Neck supple  No thyromegaly present     2 cm isthmus nodule palpated   Cardiovascular: Normal rate, regular rhythm and normal heart sounds  No murmur heard  Pulmonary/Chest: Effort normal and breath sounds normal  No respiratory distress  He has no wheezes  He has no rales  Abdominal: Soft  Bowel sounds are normal  He exhibits no distension  There is no tenderness  There is no rebound  Musculoskeletal: Normal range of motion  He exhibits no edema or deformity  Lymphadenopathy:     He has no cervical adenopathy  Neurological: He is alert and oriented to person, place, and time  Skin: Skin is warm and dry  No rash noted  No erythema  No pallor  Psychiatric: He has a normal mood and affect  His behavior is normal  Judgment and thought content normal        The history was obtained from the review of the chart, patient  Lab Results:   Lab Results   Component Value Date/Time    TSH 3RD GENERATON 0 497 12/04/2017 04:30 PM    TSH 3RD GENERATON 1 260 04/26/2017 08:24 AM       Imaging Studies:   Results for orders placed during the hospital encounter of 12/19/17   US thyroid    Impression Multiple thyroid nodules  Nodules #2 and #3 meet ultrasound criteria for fine needle aspiration  Follow-up recommended for additional nodules  Reference: ACR Thyroid Imaging, Reporting and Data System (TI-RADS): White Paper of the [employer]  J AM [facility]iol 6658;38:761-270  (additional recommendations based on American Thyroid Association 2015 guidelines )      Workstation performed: [id number]         I have personally reviewed pertinent reports  Portions of the record may have been created with voice recognition software  Occasional wrong word or "sound a like" substitutions may have occurred due to the inherent limitations of voice recognition software  Read the chart carefully and recognize, using context, where substitutions have occurred

## 2018-03-14 NOTE — PROGRESS NOTES
Daily Note     Today's date: 3/14/2018  Patient name: Edilma Grant  : 1945  MRN: [id number]  Referring provider: Kellie Lynn MD  Dx:   Encounter Diagnosis     ICD-10-CM    1  Acute pain of both shoulders M25 511     M25 512                   Subjective: pt reported inc pain in R shoulder since stopping Alieve      Objective: See treatment diary below      Assessment: Tolerated treatment well  Patient demonstrated fatigue post treatment      Plan: Progress treatment as tolerated          Precautions: HTN    Daily Treatment Diary     Manual  2/26 3/2 3/5 3/8 3/12 3/14       [facility] mob[facility] gr III inf, post  5' 5'  5' 5'       PROM  5' 5' x15' 10' 10'                                                  Exercise Diary              Serratus punch  10 x20 x20 20 20       Prone rows  10 x20 x20 20 20       Prone ext  10 x20 x20 20 20       Shoulder ER SL  10 x20 x20 20 20       Shoulder flex wand aarom supine  20 x20 x20 20 20       Table slides flex stand  10 x20 x20 20 20       Shoulder ext TB   Rx20 Rx20 G 20 Gr 20       Rows TB   Rx20 Rx20 G 20 G 20       IR TB   R x20 Rx20 R 20 R 20       ER TB   R NV Rx20 R 20 R 20       Flex AROM     20 20       Scaption AROM     10 20                                                                                                                   Modalities                           Cold pack R shoulder    10'

## 2018-03-14 NOTE — PROGRESS NOTES
Laura Biswas 74 y o  male MRN: [id number]    Encounter: [id number]      Assessment/Plan     Problem List Items Addressed This Visit     Multiple thyroid nodules - Primary      thyroid ultrasound reviewed with the patient  He has multiple thyroid nodules and 2 of the nodules meet criteria for fine-needle aspiration biopsy-will set up for sonogram guided fine-needle aspiration biopsy of thyroid nodules  Thyroid function tests within normal  Further management will depend on the results of the biopsy         Relevant Orders    US guided thyroid biopsy        CC:   Thyroid nodules    History of Present Illness     HPI:  53-year-old gentleman referred here for evaluation of thyroid nodules  He initially had a CT chest in December which showed isthmus nodule and thus he underwent a thyroid ultrasound to better delineate the thyroid nodules  Thyroid ultrasound showed multiple thyroid nodules  No obstructive symptoms ,   No FH of thyroid cancer , no h/o RT to neck       Review of Systems   Constitutional: Negative for fatigue and unexpected weight change  Respiratory: Negative for cough and shortness of breath  Cardiovascular: Negative for chest pain, palpitations and leg swelling  Gastrointestinal: Negative for constipation, diarrhea, nausea and vomiting  Musculoskeletal: Positive for arthralgias  Negative for gait problem  Skin: Negative for color change and pallor  Psychiatric/Behavioral: Negative for sleep disturbance  The patient is not nervous/anxious  All other systems reviewed and are negative        Historical Information   Past Medical History:   Diagnosis Date    Closed fracture of one rib     Last Assessed:  10/20/13    Hyperlipidemia     Hypertension      Past Surgical History:   Procedure Laterality Date    NO PAST SURGERIES      TONSILLECTOMY       Social History   History   Alcohol Use No     Comment: As per [employer]:  Social drinker     History   Drug Use No     History Smoking Status    Never Smoker   Smokeless Tobacco    Never Used     Family History:   Family History   Problem Relation Age of Onset    Stroke Mother     Alcohol abuse Father        Meds/Allergies   Current Outpatient Prescriptions   Medication Sig Dispense Refill    ascorbic acid (VITAMIN C) 500 mg tablet Take 500 mg by mouth daily      aspirin 81 MG tablet Take 81 mg by mouth daily   Cholecalciferol (VITAMIN D3) 33374 units TABS Take by mouth      hydrochlorothiazide (MICROZIDE) 12 5 mg capsule Take 12 5 mg by mouth daily   metoprolol tartrate (LOPRESSOR) 25 mg tablet Take 1 tablet (25 mg total) by mouth 2 (two) times a day for 30 days 60 tablet 0    RESTASIS 0 05 % ophthalmic emulsion       pneumococcal 13-valent conjugate vaccine (PREVNAR 13) Inject into the shoulder, thigh, or buttocks       No current facility-administered medications for this visit  No Known Allergies    Objective   Vitals: Blood pressure 142/90, pulse 73, height 5' 8" (1 727 m), weight 74 7 kg (164 lb 11 2 oz)  Physical Exam   Constitutional: He is oriented to person, place, and time  He appears well-developed and well-nourished  No distress  HENT:   Head: Normocephalic and atraumatic  Mouth/Throat: No oropharyngeal exudate  Eyes: Conjunctivae and EOM are normal  No scleral icterus  Neck: Normal range of motion  Neck supple  No thyromegaly present  2 cm isthmus nodule palpated   Cardiovascular: Normal rate, regular rhythm and normal heart sounds  No murmur heard  Pulmonary/Chest: Effort normal and breath sounds normal  No respiratory distress  He has no wheezes  He has no rales  Abdominal: Soft  Bowel sounds are normal  He exhibits no distension  There is no tenderness  There is no rebound  Musculoskeletal: Normal range of motion  He exhibits no edema or deformity  Lymphadenopathy:     He has no cervical adenopathy  Neurological: He is alert and oriented to person, place, and time     Skin: Skin is warm and dry  No rash noted  No erythema  No pallor  Psychiatric: He has a normal mood and affect  His behavior is normal  Judgment and thought content normal        The history was obtained from the review of the chart, patient  Lab Results:   Lab Results   Component Value Date/Time    TSH 3RD GENERATON 0 497 12/04/2017 04:30 PM    TSH 3RD GENERATON 1 260 04/26/2017 08:24 AM       Imaging Studies:   Results for orders placed during the hospital encounter of 12/19/17   US thyroid    Impression Multiple thyroid nodules  Nodules #2 and #3 meet ultrasound criteria for fine needle aspiration  Follow-up recommended for additional nodules  Reference: ACR Thyroid Imaging, Reporting and Data System (TI-RADS): [employer]  J [facility] Radiol 1179;71:806-616  (additional recommendations based on American Thyroid Association 2015 guidelines )      Workstation performed: [id number]         I have personally reviewed pertinent reports  Portions of the record may have been created with voice recognition software  Occasional wrong word or "sound a like" substitutions may have occurred due to the inherent limitations of voice recognition software  Read the chart carefully and recognize, using context, where substitutions have occurred

## 2018-03-19 ENCOUNTER — OFFICE VISIT (OUTPATIENT)
Dept: PHYSICAL THERAPY | Facility: MEDICAL CENTER | Age: 73
End: 2018-03-19
Payer: MEDICARE

## 2018-03-19 DIAGNOSIS — M25.511 ACUTE PAIN OF BOTH SHOULDERS: Primary | ICD-10-CM

## 2018-03-19 DIAGNOSIS — M25.512 ACUTE PAIN OF BOTH SHOULDERS: Primary | ICD-10-CM

## 2018-03-19 PROCEDURE — 97112 NEUROMUSCULAR REEDUCATION: CPT | Performed by: PHYSICAL THERAPIST

## 2018-03-19 PROCEDURE — 97110 THERAPEUTIC EXERCISES: CPT | Performed by: PHYSICAL THERAPIST

## 2018-03-19 PROCEDURE — 97140 MANUAL THERAPY 1/> REGIONS: CPT | Performed by: PHYSICAL THERAPIST

## 2018-03-19 NOTE — PROGRESS NOTES
Daily Note     Today's date: 3/19/2018  Patient name: Yelena Gonzalez  : 1945  MRN: [id number]  Referring provider: Henrry Evans MD  Dx:   Encounter Diagnosis     ICD-10-CM    1  Acute pain of both shoulders M25 511     M25 512                   Subjective: pt reported he had to help his wife walk and was therefore holding some of her weight which caused inc in shoulder pain  Reports he feels a little better today  Objective: See treatment diary below      Assessment: Tolerated treatment well  Patient would benefit from continued PT      Plan: Continue per plan of care       Precautions: HTN    Daily Treatment Diary     Manual  2/26 3/2 3/5 3/8 3/12 3/14 3/19      GH mobs gr III inf, post  5' 5'  5' 5'       PROM  5' 5' x15' 10' 10' 20'                                                 Exercise Diary              Serratus punch  10 x20 x20 20 20 20      Prone rows  10 x20 x20 20 20 20      Prone ext  10 x20 x20 20 20 20      Shoulder ER SL  10 x20 x20 20 20 20      Shoulder flex wand aarom supine  20 x20 x20 20 20 20      Table slides flex stand  10 x20 x20 20 20 20      Shoulder ext TB   Rx20 Rx20 G 20 Gr 20 Gr 20      Rows TB   Rx20 Rx20 G 20 G 20 Gr 20      IR TB   R x20 Rx20 R 20 R 20 R 20      ER TB   R NV Rx20 R 20 R 20 R 20      Flex AROM     20 20 20      Scaption AROM     10 20 20                                                                                                                  Modalities                           Cold pack R shoulder    10'

## 2018-03-21 ENCOUNTER — APPOINTMENT (OUTPATIENT)
Dept: PHYSICAL THERAPY | Facility: MEDICAL CENTER | Age: 73
End: 2018-03-21
Payer: MEDICARE

## 2018-03-22 ENCOUNTER — HOSPITAL ENCOUNTER (OUTPATIENT)
Dept: MRI IMAGING | Facility: HOSPITAL | Age: 73
Discharge: HOME | End: 2018-03-22
Payer: MEDICARE

## 2018-03-22 DIAGNOSIS — M25.512 ACUTE PAIN OF BOTH SHOULDERS: ICD-10-CM

## 2018-03-22 DIAGNOSIS — M25.511 ACUTE PAIN OF BOTH SHOULDERS: ICD-10-CM

## 2018-03-22 PROCEDURE — 73221 MRI JOINT UPR EXTREM W/O DYE: CPT

## 2018-03-23 ENCOUNTER — OFFICE VISIT (OUTPATIENT)
Dept: PHYSICAL THERAPY | Facility: MEDICAL CENTER | Age: 73
End: 2018-03-23
Payer: MEDICARE

## 2018-03-23 DIAGNOSIS — M25.511 ACUTE PAIN OF BOTH SHOULDERS: Primary | ICD-10-CM

## 2018-03-23 DIAGNOSIS — M25.512 ACUTE PAIN OF BOTH SHOULDERS: Primary | ICD-10-CM

## 2018-03-23 PROCEDURE — 97112 NEUROMUSCULAR REEDUCATION: CPT | Performed by: PHYSICAL THERAPIST

## 2018-03-23 PROCEDURE — 97140 MANUAL THERAPY 1/> REGIONS: CPT | Performed by: PHYSICAL THERAPIST

## 2018-03-23 PROCEDURE — 97110 THERAPEUTIC EXERCISES: CPT | Performed by: PHYSICAL THERAPIST

## 2018-03-23 NOTE — PROGRESS NOTES
Daily Note     Today's date: 3/23/2018  Patient name: Watson Yusuf  : 1945  MRN: [id number]  Referring provider: Sana Almeida MD  Dx:   Encounter Diagnosis     ICD-10-CM    1  Acute pain of both shoulders M25 511     M25 512                   Subjective: pt had MRI today, awaiting results from PCP  Objective: See treatment diary below      Assessment: Tolerated treatment well  Patient exhibited good technique with therapeutic exercises      Plan: Continue per plan of care   pending MRI results  Precautions: HTN    Daily Treatment Diary     Manual  2/26 3/2 3/5 3/8 3/12 3/14 3/19 3/23     [address] mobs gr III inf, post  5' 5'  5' 5'  5'     PROM  5' 5' x15' 10' 10' 20' 10'                                                Exercise Diary              Serratus punch  10 x20 x20 20 20 20 20     Prone rows  10 x20 x20 20 20 20 20     Prone ext  10 x20 x20 20 20 20 20                  Shoulder flex wand aarom supine  20 x20 x20 20 20 20 20     Table slides flex stand  10 x20 x20 20 20 20 20     Shoulder ext TB   Rx20 Rx20 G 20 Gr 20 Gr 20 Gr 20     Rows TB   Rx20 Rx20 G 20 G 20 Gr 20 Gr 20     IR TB   R x20 Rx20 R 20 R 20 R 20 R 20     ER TB   R NV Rx20 R 20 R 20 R 20 R 20     Flex AROM     20 20 20 20     Scaption AROM     10 20 20 20                                                                                                                 Modalities                           Cold pack R shoulder    10'

## 2018-03-26 ENCOUNTER — TELEPHONE (OUTPATIENT)
Dept: FAMILY MEDICINE CLINIC | Facility: CLINIC | Age: 73
End: 2018-03-26

## 2018-03-26 ENCOUNTER — OFFICE VISIT (OUTPATIENT)
Dept: PHYSICAL THERAPY | Facility: MEDICAL CENTER | Age: 73
End: 2018-03-26
Payer: MEDICARE

## 2018-03-26 DIAGNOSIS — M25.511 ACUTE PAIN OF BOTH SHOULDERS: Primary | ICD-10-CM

## 2018-03-26 DIAGNOSIS — M25.512 ACUTE PAIN OF BOTH SHOULDERS: Primary | ICD-10-CM

## 2018-03-26 PROCEDURE — 97110 THERAPEUTIC EXERCISES: CPT

## 2018-03-26 PROCEDURE — 97112 NEUROMUSCULAR REEDUCATION: CPT

## 2018-03-26 PROCEDURE — 97140 MANUAL THERAPY 1/> REGIONS: CPT

## 2018-03-26 NOTE — TELEPHONE ENCOUNTER
----- Message from Jodie Alamo MD sent at 3/22/2018  3:28 PM EDT -----  Tests abnormal were not normal, and should follow at Office visit  There is a significant amount of irritation of the cartilage of the shoulder, as well as irritation of multiple tendons    Recommend orthopedic follow-up

## 2018-03-26 NOTE — PROGRESS NOTES
Daily Note     Today's date: 3/26/2018  Patient name: King Jennifer  : 1945  MRN: [id number]  Referring provider: Radha Langston MD  Dx:   Encounter Diagnosis     ICD-10-CM    1  Acute pain of both shoulders M25 511     M25 512                   Subjective: Pt reports that he continues to have discomfort and numbness in his R UE after waking in the a m  Pt states that he went skiing this a m  And experienced elbow pain after  Objective: See treatment diary below    Precautions: HTN    Daily Treatment Diary     Manual  2/26 3/2 3/5 3/8 3/12 3/14 3/19 3/23 3/26    GH mobs gr III inf, post  5' 5'  5' 5'  5' np    PROM  5' 5' x15' 10' 10' 20' 10' 15'                                               Exercise Diary              Serratus punch  10 x20 x20 20 20 20 20 20    Prone rows  10 x20 x20 20 20 20 20 20    Prone ext  10 x20 x20 20 20 20 20 20                 Shoulder flex wand aarom supine  20 x20 x20 20 20 20 20 30    Table slides flex stand  10 x20 x20 20 20 20 20 20    Shoulder ext TB   Rx20 Rx20 G 20 Gr 20 Gr 20 Gr 20 Gr 25    Rows TB   Rx20 Rx20 G 20 G 20 Gr 20 Gr 20 Gr 25    IR TB   R x20 Rx20 R 20 R 20 R 20 R 20 R 25    ER TB   R NV Rx20 R 20 R 20 R 20 R 20 R 25    Flex AROM     20 20 20 20 20    Scaption AROM     10 20 20 20 20                                                                                                                Modalities                           Cold pack R shoulder    10'                        Assessment: Tolerated treatment well  Patient demonstrated fatigue post treatment, exhibited good technique with therapeutic exercises and would benefit from continued PT  Pt advised to contact referring physician regarding MRI results and written MRI reports  Plan: Continue per plan of care

## 2018-03-28 ENCOUNTER — OFFICE VISIT (OUTPATIENT)
Dept: PHYSICAL THERAPY | Facility: MEDICAL CENTER | Age: 73
End: 2018-03-28
Payer: MEDICARE

## 2018-03-28 DIAGNOSIS — M25.512 ACUTE PAIN OF BOTH SHOULDERS: Primary | ICD-10-CM

## 2018-03-28 DIAGNOSIS — M25.511 ACUTE PAIN OF BOTH SHOULDERS: Primary | ICD-10-CM

## 2018-03-28 PROCEDURE — G8990 OTHER PT/OT CURRENT STATUS: HCPCS | Performed by: PHYSICAL THERAPIST

## 2018-03-28 PROCEDURE — 97110 THERAPEUTIC EXERCISES: CPT

## 2018-03-28 PROCEDURE — 97140 MANUAL THERAPY 1/> REGIONS: CPT

## 2018-03-28 PROCEDURE — G8991 OTHER PT/OT GOAL STATUS: HCPCS | Performed by: PHYSICAL THERAPIST

## 2018-03-28 NOTE — PROGRESS NOTES
Daily Note     Today's date: 3/28/2018  Patient name: Nata Knott  : 1945  MRN: [id number]  Referring provider: Odell Moritz, MD  Dx:   Encounter Diagnosis     ICD-10-CM    1  Acute pain of both shoulders M25 511     M25 512        Start Time: 1400  Stop Time: 1450  Total time in clinic (min): 50 minutes    Subjective: Patient reports little overall change in sxs and returns post MRI stating that his imaging procedures were positive for "severely inflamed tendons"  Patient indicates RUE pain is greater than LUE  Objective: See treatment diary below    Precautions: HTN    Daily Treatment Diary     Manual  2/26 3/2 3/5 3/8 3/12 3/14 3/19 3/23 3/26 3/28   [address] mobs gr III inf, post  5' 5'  5' 5'  5' np AS   PROM  5' 5' x15' 10' 10' 20' 10' 15' AS                                              Exercise Diary           3/28   Serratus punch  10 x20 x20 20 20 20 20 20 20   Prone rows  10 x20 x20 20 20 20 20 20 20   Prone ext  10 x20 x20 20 20 20 20 20 20                Shoulder flex wand aarom supine  20 x20 x20 20 20 20 20 30 30   Table slides flex stand  10 x20 x20 20 20 20 20 20 20   Shoulder ext TB   Rx20 Rx20 G 20 Gr 20 Gr 20 Gr 20 Gr 25 30   Rows TB   Rx20 Rx20 G 20 G 20 Gr 20 Gr 20 Gr 25 30   IR TB   R x20 Rx20 R 20 R 20 R 20 R 20 R 25 30   ER TB   R NV Rx20 R 20 R 20 R 20 R 20 R 25 30   Flex AROM     20 20 20 20 20 20   Scaption AROM     10 20 20 20 20 20                                                                                                               Modalities           3/28                Cold pack R shoulder    10'      10'                  Assessment: Patient demonstrated minor difficulty with RUE activities this afternoon noting pain and pulling with AROM/PROM  Patient would benefit from continued skilled PT intervention to address his remaining deficits and maximize his functional mobility  Plan: Continue per plan of care

## 2018-03-30 ENCOUNTER — HOSPITAL ENCOUNTER (OUTPATIENT)
Dept: ULTRASOUND IMAGING | Facility: HOSPITAL | Age: 73
Discharge: HOME | End: 2018-03-30
Attending: INTERNAL MEDICINE
Payer: MEDICARE

## 2018-03-30 DIAGNOSIS — E04.2 MULTIPLE THYROID NODULES: ICD-10-CM

## 2018-03-30 PROCEDURE — 10022 HB FNA W/IMAGE: CPT

## 2018-03-30 PROCEDURE — 76942 ECHO GUIDE FOR BIOPSY: CPT

## 2018-03-30 PROCEDURE — 88173 CYTOPATH EVAL FNA REPORT: CPT | Performed by: PATHOLOGY

## 2018-03-30 RX ORDER — LIDOCAINE HYDROCHLORIDE 10 MG/ML
5 INJECTION, SOLUTION INFILTRATION; PERINEURAL ONCE
Status: COMPLETED | OUTPATIENT
Start: 2018-03-30 | End: 2018-03-30

## 2018-03-30 RX ADMIN — LIDOCAINE HYDROCHLORIDE 5 ML: 10 INJECTION, SOLUTION INFILTRATION; PERINEURAL at 15:30

## 2018-04-02 ENCOUNTER — OFFICE VISIT (OUTPATIENT)
Dept: PHYSICAL THERAPY | Facility: MEDICAL CENTER | Age: 73
End: 2018-04-02
Payer: MEDICARE

## 2018-04-02 DIAGNOSIS — M25.511 ACUTE PAIN OF BOTH SHOULDERS: Primary | ICD-10-CM

## 2018-04-02 DIAGNOSIS — M25.512 ACUTE PAIN OF BOTH SHOULDERS: Primary | ICD-10-CM

## 2018-04-02 PROCEDURE — 97110 THERAPEUTIC EXERCISES: CPT | Performed by: PHYSICAL THERAPIST

## 2018-04-02 PROCEDURE — 97140 MANUAL THERAPY 1/> REGIONS: CPT | Performed by: PHYSICAL THERAPIST

## 2018-04-02 NOTE — PROGRESS NOTES
Daily Note     Today's date: 2018  Patient name: Karyn Davidson  : 1945  MRN: [id number]  Referring provider: Adela Danielson MD  Dx:   Encounter Diagnosis     ICD-10-CM    1  Acute pain of both shoulders M25 511     M25 512                   Subjective: pt reported he doesn't have as much pain when he wakes up in the morning  He has not made an appt with Dr Gus Sierra yet and is going to call today  Objective: See treatment diary below      Assessment: Tolerated treatment well  Patient would benefit from continued PT      Plan: Continue per plan of care       Precautions: HTN    Daily Treatment Diary     Manual  4/2 3/2 3/5 3/8 3/12 3/14 3/19 3/23 3/26 3/28   [address] [facility] III inf, post 5' 5' 5'  5' 5'  5' np AS   PROM 5' 5' 5' x15' 10' 10' 20' 10' 15' AS                                              Exercise Diary           3/28   Serratus punch 20 10 x20 x20 20 20 20 20 20 20   Prone rows 20 10 x20 x20 20 20 20 20 20 20   Prone ext 20 10 x20 x20 20 20 20 20 20 20                Shoulder flex wand aarom supine 30 20 x20 x20 20 20 20 20 30 30   Table slides flex stand 20 10 x20 x20 20 20 20 20 20 20   Shoulder ext TB Gr 30  Rx20 Rx20 G 20 Gr 20 Gr 20 Gr 20 Gr 25 30   Rows TB Bl 30  Rx20 Rx20 G 20 G 20 Gr 20 Gr 20 Gr 25 30   IR TB Gr 30  R x20 Rx20 R 20 R 20 R 20 R 20 R 25 30   ER TB Gr 30  R NV Rx20 R 20 R 20 R 20 R 20 R 25 30   Flex AROM 20    20 20 20 20 20 20   Scaption AROM 20    10 20 20 20 20 20                                                                                                               Modalities           3/28                Cold pack R shoulder    10'      10'

## 2018-04-04 ENCOUNTER — OFFICE VISIT (OUTPATIENT)
Dept: PHYSICAL THERAPY | Facility: MEDICAL CENTER | Age: 73
End: 2018-04-04
Payer: MEDICARE

## 2018-04-04 ENCOUNTER — TELEPHONE (OUTPATIENT)
Dept: ENDOCRINOLOGY | Facility: CLINIC | Age: 73
End: 2018-04-04

## 2018-04-04 DIAGNOSIS — M25.512 ACUTE PAIN OF BOTH SHOULDERS: Primary | ICD-10-CM

## 2018-04-04 DIAGNOSIS — M25.511 ACUTE PAIN OF BOTH SHOULDERS: Primary | ICD-10-CM

## 2018-04-04 PROCEDURE — 97110 THERAPEUTIC EXERCISES: CPT | Performed by: PHYSICAL THERAPIST

## 2018-04-04 PROCEDURE — 97140 MANUAL THERAPY 1/> REGIONS: CPT | Performed by: PHYSICAL THERAPIST

## 2018-04-04 PROCEDURE — 97112 NEUROMUSCULAR REEDUCATION: CPT | Performed by: PHYSICAL THERAPIST

## 2018-04-04 NOTE — PROGRESS NOTES
Daily Note     Today's date: 2018  Patient name: Pedro Kapoor  : 1945  MRN: [id number]  Referring provider: Shannan Camp MD  Dx:   Encounter Diagnosis     ICD-10-CM    1  Acute pain of both shoulders M25 511     M25 512                   Subjective: ***      Objective: See treatment diary below      Assessment: Tolerated treatment {Tolerated treatment :4670927807}   Patient {assessment:6115905133}      Plan: {PLAN:1899640210}  Precautions: HTN    Daily Treatment Diary     Manual  4/2 3/2 3/5 3/8 3/12 3/14 3/19 3/23 3/26 3/28   [address] mobs gr III inf, post 5' 5' 5'  5' 5'  5' np AS   PROM 5' 5' 5' x15' 10' 10' 20' 10' 15' AS                                              Exercise Diary           3/28   Serratus punch 20 10 x20 x20 20 20 20 20 20 20   Prone rows 20 10 x20 x20 20 20 20 20 20 20   Prone ext 20 10 x20 x20 20 20 20 20 20 20                Shoulder flex wand aarom supine 30 20 x20 x20 20 20 20 20 30 30   Table slides flex stand 20 10 x20 x20 20 20 20 20 20 20   Shoulder ext TB Gr 30  Rx20 Rx20 G 20 Gr 20 Gr 20 Gr 20 Gr 25 30   Rows TB Bl 30  Rx20 Rx20 G 20 G 20 Gr 20 Gr 20 Gr 25 30   IR TB Gr 30  R x20 Rx20 R 20 R 20 R 20 R 20 R 25 30   ER TB Gr 30  R NV Rx20 R 20 R 20 R 20 R 20 R 25 30   Flex AROM 20    20 20 20 20 20 20   Scaption AROM 20    10 20 20 20 20 20                                                                                                               Modalities           3/28                Cold pack R shoulder    10'      10'

## 2018-04-04 NOTE — TELEPHONE ENCOUNTER
----- Message from Zahra Mckinnon MD sent at 4/3/2018  1:13 PM EDT -----  Please call the patient regarding his  Result -thyroid nodules negative for malignancy-will monitor and repeat a thyroid ultrasound in 6 months

## 2018-04-04 NOTE — PROGRESS NOTES
PT Re-Evaluation     Today's date: 2018  Patient name: Amarilis Ahn  : 1945  MRN: [id number]  Referring provider: Monique Koehler MD  Dx:   Encounter Diagnosis     ICD-10-CM    1  Acute pain of both shoulders M25 511     M25 512                   Assessment  Impairments: abnormal or restricted ROM, impaired physical strength and pain with function    Assessment details: Amarilis Ahn has been compliant with attending PT and home exercise program since initial eval   Demi Salinas  has made improvements in objective data since initial eval but is still limited compared to prior level of function  Demi Salinas continues with above listed impairments and would benefit from additional skilled PT to address these deficits to return to prior level of function      Understanding of Dx/Px/POC: good   Prognosis: good    Goals  Impairment Goals  - Decrease pain to 0-/10 - not met  - Improve ROM equal to [facility] - met on L, not met on R  - Increase strength equal to 5/5 B UE - not met    Functional Goals  - Increase Functional Status Measure to: 79 - not met  - Patient will be independent with comprehensive HEP - met  - Patient will be able to sleep without waking 2* pain - partially met (takes Advil or Tylenol prior to bed)  - Patient will be able to reach for object overhead - met  - Patient will be able to lift/carry objects without provocation of symptoms - met        Plan  Patient would benefit from: skilled PT  Referral necessary: No  Planned therapy interventions: home exercise program, manual therapy, neuromuscular re-education, patient education, functional ROM exercises, strengthening, stretching and joint mobilization  Frequency: 2x week  Duration in weeks: 12  Treatment plan discussed with: patient        Subjective Evaluation    History of Present Illness  Date of onset: 2017  Mechanism of injury: trauma  Not a recurrent problem   Pain  Current pain ratin  At best pain ratin  At worst pain rating: 3  Aggravating factors: overhead activity and lifting  Progression: improved      Diagnostic Tests  X-ray: normal  MRI studies: abnormal  Treatments  Current treatment: physical therapy  Patient Goals  Patient goals for therapy: increased strength, decreased pain, increased motion and return to sport/leisure activities          Objective     Tenderness     Left Shoulder   No tenderness     Right Shoulder  No tenderness     Cervical/Thoracic Screen   Cervical range of motion within normal limits    Active Range of Motion   Left Shoulder   Flexion: 170 degrees   External rotation BTH: C5   Internal rotation BTB: L5 with pain    Right Shoulder   Flexion: 165 degrees   External rotation BTH: C5   Internal rotation BTB: L5 with pain    Passive Range of Motion   Left Shoulder   Flexion: 165 degrees   Abduction: 170 degrees   External rotation 45°: 50 degrees   Internal rotation 90°: 55 degrees     Right Shoulder   Flexion: 165 degrees   Abduction: 165 degrees   External rotation 45°: 55 degrees   Internal rotation 90°: 60 degrees     Joint Play   Left Shoulder  Hypomobile in the inferior capsule  Right Shoulder  Hypomobile in the inferior capsule  Strength/Myotome Testing     Left Shoulder     Planes of Motion   Flexion: 4+   Extension: 4+   External rotation at 90°: 4   Internal rotation at 90°: 4     Isolated Muscles   Infraspinatus: 4   Lower trapezius: 4-   Middle trapezius: 4   Supraspinatus: 4     Right Shoulder     Planes of Motion   Flexion: 4+   Extension: 4+   External rotation at 90°: 4   Internal rotation at 90°: 4     Isolated Muscles   Infraspinatus: 4   Lower trapezius: 4-   Middle trapezius: 4   Supraspinatus: 4     Tests     Left Shoulder   Negative AC shear, active compression ([place]), anterior slide, posterior load and shift and anterior slide   Right Shoulder   Negative AC shear, active compression ([place]), anterior slide, posterior load and shift and anterior slide   Precautions: HTN    Daily Treatment Diary     Manual  4/2 4/4           [address] mobs gr III inf, post 5' 5'           PROM 5' 5'                                                      Exercise Diary              Serratus punch 20 20           Prone rows 20 20           Prone ext 20 20                        Shoulder flex wand aarom supine 30 30           Table slides flex stand 20 20           Shoulder ext TB Gr 30 Gr 30           Rows TB Bl 30 Bl 30           IR TB Gr 30 Gr 30           ER TB Gr 30 Gr 30           Flex AROM 20 20           Scaption AROM 20 20                                                                                                                       Modalities                           Cold pack R shoulder  10'

## 2018-04-09 ENCOUNTER — OFFICE VISIT (OUTPATIENT)
Dept: PHYSICAL THERAPY | Facility: MEDICAL CENTER | Age: 73
End: 2018-04-09
Payer: MEDICARE

## 2018-04-09 DIAGNOSIS — M25.512 ACUTE PAIN OF BOTH SHOULDERS: Primary | ICD-10-CM

## 2018-04-09 DIAGNOSIS — I10 ESSENTIAL HYPERTENSION: ICD-10-CM

## 2018-04-09 DIAGNOSIS — M25.511 ACUTE PAIN OF BOTH SHOULDERS: Primary | ICD-10-CM

## 2018-04-09 PROCEDURE — 97110 THERAPEUTIC EXERCISES: CPT | Performed by: PHYSICAL THERAPIST

## 2018-04-09 PROCEDURE — 97112 NEUROMUSCULAR REEDUCATION: CPT | Performed by: PHYSICAL THERAPIST

## 2018-04-09 PROCEDURE — 97140 MANUAL THERAPY 1/> REGIONS: CPT | Performed by: PHYSICAL THERAPIST

## 2018-04-09 RX ORDER — HYDROCHLOROTHIAZIDE 25 MG/1
12.5 TABLET ORAL DAILY
Qty: 45 TABLET | Refills: 1 | Status: SHIPPED | OUTPATIENT
Start: 2018-04-09 | End: 2018-11-27 | Stop reason: SDUPTHER

## 2018-04-09 NOTE — PROGRESS NOTES
Daily Note     Today's date: 2018  Patient name: Sung Bloch  : 1945  MRN: [id number]  Referring provider: Chace Leary MD  Dx:   Encounter Diagnosis     ICD-10-CM    1  Acute pain of both shoulders M25 511     M25 512                   Subjective: pt reported he started taking Alieve and feels much better in terms of discomfort and stiffness in wrists upon wakening      Objective: See treatment diary below      Assessment: Tolerated treatment well  Patient exhibited good technique with therapeutic exercises      Plan: Progress treatment as tolerated        Precautions: HTN    Daily Treatment Diary     Manual            [address] [facility] gr III inf, post 5' 5'           PROM 5' 5' 10'                                                     Exercise Diary              Serratus punch 20 20 20          Prone rows 20 20 20          Prone ext 20 20 20                       Shoulder flex wand aarom supine 30 30 30          Table slides flex stand 20 20 20          Shoulder ext TB Gr 30 Gr 30 bl 30          Rows TB Bl 30 Bl 30 30          IR TB Gr 30 Gr 30 30          ER TB Gr 30 Gr 30 30          Flex AROM 20 20 20          Scaption AROM 20 20 20                                                                                                                      Modalities                           Cold pack R shoulder  10'

## 2018-04-11 ENCOUNTER — OFFICE VISIT (OUTPATIENT)
Dept: PHYSICAL THERAPY | Facility: MEDICAL CENTER | Age: 73
End: 2018-04-11
Payer: MEDICARE

## 2018-04-11 DIAGNOSIS — M25.511 ACUTE PAIN OF BOTH SHOULDERS: Primary | ICD-10-CM

## 2018-04-11 DIAGNOSIS — M25.512 ACUTE PAIN OF BOTH SHOULDERS: Primary | ICD-10-CM

## 2018-04-11 PROCEDURE — 97110 THERAPEUTIC EXERCISES: CPT | Performed by: PHYSICAL THERAPIST

## 2018-04-11 PROCEDURE — 97112 NEUROMUSCULAR REEDUCATION: CPT | Performed by: PHYSICAL THERAPIST

## 2018-04-11 PROCEDURE — 97140 MANUAL THERAPY 1/> REGIONS: CPT | Performed by: PHYSICAL THERAPIST

## 2018-04-11 NOTE — PROGRESS NOTES
Daily Note     Today's date: 2018  Patient name: Emma Dorsey  : 1945  MRN: [id number]  Referring provider: Amy Russell MD  Dx:   Encounter Diagnosis     ICD-10-CM    1  Acute pain of both shoulders M25 511     M25 512                   Subjective: pt reported continues to feel good with taking Alieve  He has appt with Dr Lina Mari on Tues      Objective: See treatment diary below      Assessment: Tolerated treatment well  Patient exhibited good technique with therapeutic exercises      Plan: Continue per plan of care       Precautions: HTN    Daily Treatment Diary     Manual           [facility] mobs gr III inf, post 5' 5'           PROM 5' 5' 10' 10'                                                    Exercise Diary              Serratus punch 20 20 20 30         Prone rows 20 20 20 30         Prone ext 20 20 20 30                      Shoulder flex wand aarom supine 30 30 30 30         Table slides flex stand 20 20 20          Shoulder ext TB Gr 30 Gr 30 bl 30 bl 30         Rows TB Bl 30 Bl 30 30 bl 30         IR TB Gr 30 Gr 30 30 Gr 30         ER TB Gr 30 Gr 30 30 Gr 30         Flex AROM 20 20 20 30         Scaption AROM 20 20 20 30                                                                                                                     Modalities                           Cold pack R shoulder  10'  10'

## 2018-04-16 ENCOUNTER — OFFICE VISIT (OUTPATIENT)
Dept: PHYSICAL THERAPY | Facility: MEDICAL CENTER | Age: 73
End: 2018-04-16
Payer: MEDICARE

## 2018-04-16 DIAGNOSIS — M25.511 ACUTE PAIN OF BOTH SHOULDERS: Primary | ICD-10-CM

## 2018-04-16 DIAGNOSIS — M25.512 ACUTE PAIN OF BOTH SHOULDERS: Primary | ICD-10-CM

## 2018-04-16 PROCEDURE — 97110 THERAPEUTIC EXERCISES: CPT | Performed by: PHYSICAL THERAPIST

## 2018-04-16 PROCEDURE — 97140 MANUAL THERAPY 1/> REGIONS: CPT | Performed by: PHYSICAL THERAPIST

## 2018-04-16 NOTE — PROGRESS NOTES
Daily Note     Today's date: 2018  Patient name: Nikko Rivera  : 1945  MRN: [id number]  Referring provider: Ramin Marrero MD  Dx:   Encounter Diagnosis     ICD-10-CM    1  Acute pain of both shoulders M25 511     M25 512                   Subjective: Alirio Bradshaw reports he was juggling hacky-sacks Wed evening for approx 5-7 minutes when he started to have pain in B shoulders  He reports pain has continued  He did raking in his yard and shrubbery beds for approx 30 minutes on Sat and then last night while he was at the gym riding a bike he was doing some "self stretches" which were described as movement to end range  Today he reports frustration in return of pain as he was feeling much better for the 2 weeks prior  He also reports swelling in L wrist since these activities  Objective: See treatment diary below      Assessment: Tolerated treatment fair   Patient exhibited good technique with therapeutic exercises      Plan: Has appt with Dr Alayna Victor tomorrow and returns to PT on Wed    Precautions: HTN    Daily Treatment Diary     Manual          [address] [facility] III inf, post 5' 5'           PROM 5' 5' 10' 10' 15'                                                   Exercise Diary              Serratus punch 20 20 20 30 30        Prone rows 20 20 20 30 30        Prone ext 20 20 20 30 30                     Shoulder flex wand aarom supine 30 30 30 30 30        Table slides flex stand 20 20 20          Shoulder ext TB Gr 30 Gr 30 bl 30 bl 30 Gr 20        Rows TB Bl 30 Bl 30 30 bl 30 Gr 20        IR TB Gr 30 Gr 30 30 Gr 30 Gr 20        ER TB Gr 30 Gr 30 30 Gr 30 Gr 20        Flex AROM 20 20 20 30 20        Scaption AROM 20 20 20 30 20        Sleeper stretch     20"x3                                                                                                       Modalities                           Cold pack R shoulder  10'  10'

## 2018-04-17 ENCOUNTER — OFFICE VISIT (OUTPATIENT)
Dept: OBGYN CLINIC | Facility: MEDICAL CENTER | Age: 73
End: 2018-04-17
Payer: MEDICARE

## 2018-04-17 VITALS
HEIGHT: 68 IN | SYSTOLIC BLOOD PRESSURE: 148 MMHG | DIASTOLIC BLOOD PRESSURE: 85 MMHG | HEART RATE: 66 BPM | BODY MASS INDEX: 24.71 KG/M2 | WEIGHT: 163 LBS

## 2018-04-17 DIAGNOSIS — M75.52 SUBACROMIAL BURSITIS OF LEFT SHOULDER JOINT: ICD-10-CM

## 2018-04-17 DIAGNOSIS — M19.011 OSTEOARTHRITIS OF RIGHT ACROMIOCLAVICULAR JOINT: Primary | ICD-10-CM

## 2018-04-17 PROCEDURE — 99214 OFFICE O/P EST MOD 30 MIN: CPT | Performed by: ORTHOPAEDIC SURGERY

## 2018-04-17 PROCEDURE — 20610 DRAIN/INJ JOINT/BURSA W/O US: CPT | Performed by: PHYSICIAN ASSISTANT

## 2018-04-17 RX ORDER — BUPIVACAINE HYDROCHLORIDE 2.5 MG/ML
2 INJECTION, SOLUTION INFILTRATION; PERINEURAL
Status: COMPLETED | OUTPATIENT
Start: 2018-04-17 | End: 2018-04-17

## 2018-04-17 RX ORDER — METHYLPREDNISOLONE ACETATE 40 MG/ML
1 INJECTION, SUSPENSION INTRA-ARTICULAR; INTRALESIONAL; INTRAMUSCULAR; SOFT TISSUE
Status: COMPLETED | OUTPATIENT
Start: 2018-04-17 | End: 2018-04-17

## 2018-04-17 RX ADMIN — BUPIVACAINE HYDROCHLORIDE 2 ML: 2.5 INJECTION, SOLUTION INFILTRATION; PERINEURAL at 12:12

## 2018-04-17 RX ADMIN — METHYLPREDNISOLONE ACETATE 1 ML: 40 INJECTION, SUSPENSION INTRA-ARTICULAR; INTRALESIONAL; INTRAMUSCULAR; SOFT TISSUE at 12:12

## 2018-04-17 NOTE — PROGRESS NOTES
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 and rotator cuff tendinitis  Subacromial bursitis left shoulder    · After discussion of treatment options the patient agreed he would like to continue with conservative treatment  He was given a prescription to continue with physical therapy for both shoulders which would include scapular stabilization exercises  · After discussion due to the concentration of pain in his right shoulder over the [facility] joint he did agree to a steroid injection of the right AC joint which was prepared and injected sterilely and tolerated well  · Continue with ice and analgesics as needed  Return in about 6 weeks (around 2018)  History of Present Illness  Kianna Britton is a 74 y o  male who presents for evaluation of bilateral shoulder pain, the right worse than left, that started following a fall while skiing on 2017  Initially the patient denies any issues with his shoulder but 2 weeks following this fall he slowly started with pain at night and then during the day with movement over the next few weeks  Initially treated with over-the-counter NSAIDs including Aleve which does still helps with the discomfort but due to lack of progression so his primary care doctor who ordered physical therapy which she has been doing for about 7 weeks and an MRI that showed arthritic changes and partial tearing in the rotator cuff  He feels that the therapy has been helping and is much better than when he started but he still has discomfort especially at nighttime in both shoulders  He is denying any previous injuries to either shoulder at this time      Review of Systems  Pertinent items are noted in HPI  All other systems were reviewed and are negative  Physical Exam  /85   Pulse 66   Ht 5' 8" (1 727 m)   Wt 73 9 kg (163 lb)   BMI 24 78 kg/m²   Cons: Appears well  No apparent distress  Psych: Alert  Oriented x3  Mood and affect normal   Eyes: PERRLA, EOMI  Resp: Normal effort  No audible wheezing or stridor  CV: Palpable pulse  No discernable arrhythmia  No LE edema  Lymph:  No palpable cervical, axillary, or inguinal lymphadenopathy  Skin: Warm  No palpable masses  No visible lesions  Neuro: Normal muscle tone  Normal and symmetric DTR's  Bilateral Shoulder Exam  Alignment / Posture:  Normal cervical alignment  Normal shoulder posture  Normal scapular position  Inspection:  No swelling  No edema  No deformity  Palpation:  Moderate tenderness at The right AC joint and mild tenderness in the anterior lateral aspect of the shoulder and the subacromial space and in the area of the biceps tendon on the right  On the left he does have mild tenderness over the [facility] joint and in the area of the bicipital groove     ROM:  Normal neck ROM  Shoulder ° bilaterally  Shoulder ER 85° bilaterally  Shoulder IR To T12 bilaterally  Strength:  5/5 supraspinatus, infraspinatus, and subscapularis bilaterally with some discomfort especially in the right through all movements with resistance  Stability:  No objective shoulder instability  Tests: (+) Vang  (+) Painful arc  (+) Speed  (+) [place]  (+) Internal impingement test   On the right shoulder pain was all concentrated over the [facility] joint  In the left these tests are also positive the pain was more in the subacromial space/biceps tendon area  (-) [place]  Neurovascular:  Sensation intact in Ax/R/M/U nerve distributions  Sensation intact in all digital nerve distributions  Fingers warm and perfused  Studies Reviewed  XR of right shoulder - Moderate narrowing of the right AC joint with spurring noted   No arthritic changes at the glenohumeral noted this time  No fractures or dislocations seen  MRI of right shoulder - Shows moderate glenohumeral chondromalacia with mild bony changes of osteoarthritis  There is severe acromioclavicular degenerative changes noted including edema across the joint space  There is partial-thickness insertional tearing of the subscap tendon  There is a supraspinatus and infraspinatus tendinopathy  Tenosynovitis of the long head of the biceps tendon noted with possible partial tear  Subacromial-subdeltoid bursitis noted without high-grade supraspinatus or infraspinatus tear  Medium joint arthrocentesis  Date/Time: 4/17/2018 12:12 PM  Consent given by: patient  Supporting Documentation  Indications: pain   Procedure Details  Location: shoulder - R acromioclavicular  Needle size: 22 G  Ultrasound guidance: no  Approach: anteromedial  Medications administered: 2 mL bupivacaine 0 25 %; 1 mL methylPREDNISolone acetate 40 mg/mL    Patient tolerance: patient tolerated the procedure well with no immediate complications               Medical, Surgical, Family, and Social History  The patient's medical history, family history, and social history, were reviewed and updated as appropriate      Past Medical History:   Diagnosis Date    Closed fracture of one rib     Last Assessed:  10/20/13    Hyperlipidemia     Hypertension        Past Surgical History:   Procedure Laterality Date    NO PAST SURGERIES      TONSILLECTOMY         Family History   Problem Relation Age of Onset    Stroke Mother     Alcohol abuse Father        Social History     Occupational History    RETIRED      Social History Main Topics    Smoking status: Never Smoker    Smokeless tobacco: Never Used    Alcohol use No      Comment: As per Allscripts:  Social drinker    Drug use: No    Sexual activity: Not on file       No Known Allergies      Current Outpatient Prescriptions:     ascorbic acid (VITAMIN C) 500 mg tablet, Take 500 mg by mouth daily, Disp: , Rfl:     aspirin 81 MG tablet, Take 81 mg by mouth daily  , Disp: , Rfl:     Cholecalciferol (VITAMIN D3) 13844 units TABS, Take by mouth, Disp: , Rfl:     hydrochlorothiazide (HYDRODIURIL) 25 mg tablet, Take 0 5 tablets (12 5 mg total) by mouth daily, Disp: 45 tablet, Rfl: 1    metoprolol tartrate (LOPRESSOR) 25 mg tablet, Take 1 tablet (25 mg total) by mouth 2 (two) times a day for 30 days, Disp: 180 tablet, Rfl: 1    pneumococcal 13-valent conjugate vaccine (PREVNAR 13), Inject into the shoulder, thigh, or buttocks, Disp: , Rfl:     RESTASIS 0 05 % ophthalmic emulsion, , Disp: , Rfl:       Carmen Melchor PA-C    Scribe Attestation    I,:    am acting as a scribe while in the presence of the attending physician :        I,:    personally performed the services described in this documentation    as scribed in my presence :

## 2018-04-18 ENCOUNTER — OFFICE VISIT (OUTPATIENT)
Dept: PHYSICAL THERAPY | Facility: MEDICAL CENTER | Age: 73
End: 2018-04-18
Payer: MEDICARE

## 2018-04-18 DIAGNOSIS — M25.511 ACUTE PAIN OF BOTH SHOULDERS: Primary | ICD-10-CM

## 2018-04-18 DIAGNOSIS — M25.512 ACUTE PAIN OF BOTH SHOULDERS: Primary | ICD-10-CM

## 2018-04-18 PROCEDURE — 97112 NEUROMUSCULAR REEDUCATION: CPT | Performed by: PHYSICAL THERAPIST

## 2018-04-18 PROCEDURE — 97110 THERAPEUTIC EXERCISES: CPT | Performed by: PHYSICAL THERAPIST

## 2018-04-18 PROCEDURE — 97140 MANUAL THERAPY 1/> REGIONS: CPT | Performed by: PHYSICAL THERAPIST

## 2018-04-18 NOTE — PROGRESS NOTES
Daily Note     Today's date: 2018  Patient name: Adan Owen  : 1945  MRN: [id number]  Referring provider: Berkley lAvarez MD  Dx: No diagnosis found  Subjective: pt saw Dr Maninder Lozada yesterday who performed [facility] joint injection on R  Pt reports immediate relief of pain and had no pain last night for the 1st time  He was instructed to continue PT for B shoulders  Objective: See treatment diary below      Assessment: Tolerated treatment well  Patient would benefit from continued PT      Plan: Continue per plan of care       Precautions: HTN    Daily Treatment Diary     Manual         [address] III inf, post 5' 5'           PROM 5' 5' 10' 10' 15' 15'                                                  Exercise Diary              Serratus punch 20 20 20 30 30 30       Prone rows 20 20 20 30 30 30       Prone ext 20 20 20 30 30 30                    Shoulder flex wand aarom supine 30 30 30 30 30 30       Table slides flex stand 20 20 20          Shoulder ext TB Gr 30 Gr 30 bl 30 bl 30 Gr 20 Bl 20       Rows TB Bl 30 Bl 30 30 bl 30 Gr 20 Bl 20       IR TB Gr 30 Gr 30 30 Gr 30 Gr 20 Gr 20       ER TB Gr 30 Gr 30 30 Gr 30 Gr 20 Gr 20       Flex AROM 20 20 20 30 20 20       Scaption AROM 20 20 20 30 20 20       Sleeper stretch     20"x3 20"x3                                                                                                      Modalities                           Cold pack R shoulder  10'  10'

## 2018-04-23 ENCOUNTER — OFFICE VISIT (OUTPATIENT)
Dept: PHYSICAL THERAPY | Facility: MEDICAL CENTER | Age: 73
End: 2018-04-23
Payer: MEDICARE

## 2018-04-23 DIAGNOSIS — M25.511 ACUTE PAIN OF BOTH SHOULDERS: Primary | ICD-10-CM

## 2018-04-23 DIAGNOSIS — M25.512 ACUTE PAIN OF BOTH SHOULDERS: Primary | ICD-10-CM

## 2018-04-23 PROCEDURE — 97110 THERAPEUTIC EXERCISES: CPT | Performed by: PHYSICAL THERAPIST

## 2018-04-23 PROCEDURE — 97112 NEUROMUSCULAR REEDUCATION: CPT | Performed by: PHYSICAL THERAPIST

## 2018-04-23 PROCEDURE — 97140 MANUAL THERAPY 1/> REGIONS: CPT | Performed by: PHYSICAL THERAPIST

## 2018-04-23 NOTE — PROGRESS NOTES
Daily Note     Today's date: 2018  Patient name: Juana Foster  : 1945  MRN: [id number]  Referring provider: Shakeel Noel MD  Dx:   Encounter Diagnosis     ICD-10-CM    1  Acute pain of both shoulders M25 511     M25 512                   Subjective: pt reported he did yard work this weekend and his shoulders didn't hurt      Objective: See treatment diary below      Assessment: Tolerated treatment well  Patient exhibited good technique with therapeutic exercises      Plan: Continue per plan of care       Precautions: HTN    Daily Treatment Diary     Manual        [facility] mobs gr III inf, post 5' 5'           PROM 5' 5' 10' 10' 15' 15' 15'                                                 Exercise Diary              Serratus punch 20 20 20 30 30 30 30      Prone rows 20 20 20 30 30 30 30      Prone ext 20 20 20 30 30 30 30                   Shoulder flex wand aarom supine 30 30 30 30 30 30 30      Table slides flex stand 20 20 20          Shoulder ext TB Gr 30 Gr 30 bl 30 bl 30 Gr 20 Bl 20 Bl 20      Rows TB Bl 30 Bl 30 30 bl 30 Gr 20 Bl 20 Bl 20      IR TB Gr 30 Gr 30 30 Gr 30 Gr 20 Gr 20 Gr 20      ER TB Gr 30 Gr 30 30 Gr 30 Gr 20 Gr 20 Gr 20      Flex AROM 20 20 20 30 20 20 20      Scaption AROM 20 20 20 30 20 20 20      Sleeper stretch     20"x3 20"x3 20"x3                                                                                                     Modalities                           Cold pack R shoulder  10'  10'   10'

## 2018-04-24 NOTE — PROGRESS NOTES
Established Patient Progress Note       Chief Complaint   Patient presents with    Thyroid Problem        History of Present Illness:     Cortez Wade is a 74 y o  male with a history of thyroid nodules  The thyroid nodules were initially discovered in December 2017 after he had a CT of his chest  He had subsequent US of thyroid which showed multiple nodules and two of which meeting criteria for biopsy  FNA was completed on 3/30/18 which showed both nodules were benign  He denies family history of thyroid disorders  He denies neck pain, dysphonia, dysphagia, or dyspnea  Patient Active Problem List   Diagnosis    Hyperlipidemia    Hypertension    Dizziness and giddiness    Acute pain of both shoulders    Viral upper respiratory illness    Abnormal imaging of thyroid    Allergic contact dermatitis due to adhesives    Allergic rhinitis    Elevated prostate specific antigen (PSA)    Multiple thyroid nodules    Onychomycosis of toenail    Primary osteoarthritis of first carpometacarpal joint of left hand    Osteoarthritis of right acromioclavicular joint    Subacromial bursitis of left shoulder joint      Past Medical History:   Diagnosis Date    Closed fracture of one rib     Last Assessed:  10/20/13    Hyperlipidemia     Hypertension       Past Surgical History:   Procedure Laterality Date    NO PAST SURGERIES      TONSILLECTOMY        Family History   Problem Relation Age of Onset    Stroke Mother     Alcohol abuse Father      Social History   Substance Use Topics    Smoking status: Never Smoker    Smokeless tobacco: Never Used    Alcohol use No      Comment: As per Allscripts:  Social drinker     No Known Allergies    Current Outpatient Prescriptions:     ascorbic acid (VITAMIN C) 500 mg tablet, Take 500 mg by mouth daily, Disp: , Rfl:     aspirin 81 MG tablet, Take 81 mg by mouth daily  , Disp: , Rfl:     Cholecalciferol (VITAMIN D3) 45762 units TABS, Take by mouth, Disp: , Rfl:     hydrochlorothiazide (HYDRODIURIL) 25 mg tablet, Take 0 5 tablets (12 5 mg total) by mouth daily, Disp: 45 tablet, Rfl: 1    metoprolol tartrate (LOPRESSOR) 25 mg tablet, Take 1 tablet (25 mg total) by mouth 2 (two) times a day for 30 days, Disp: 180 tablet, Rfl: 1    naproxen sodium (ALEVE) 220 MG tablet, Take 220 mg by mouth 2 (two) times a day with meals, Disp: , Rfl:     RESTASIS 0 05 % ophthalmic emulsion, , Disp: , Rfl:     Review of Systems   Constitutional: Negative for activity change, appetite change and fatigue  HENT: Negative for sore throat, trouble swallowing and voice change  Eyes: Negative for visual disturbance  Respiratory: Negative for choking, chest tightness and shortness of breath  Cardiovascular: Negative for chest pain, palpitations and leg swelling  Gastrointestinal: Negative for abdominal pain, constipation and diarrhea  Endocrine: Negative for cold intolerance, heat intolerance, polydipsia, polyphagia and polyuria  Genitourinary: Negative for frequency  Musculoskeletal: Negative for arthralgias and myalgias  Skin: Negative for rash  Neurological: Negative for dizziness and syncope  Hematological: Negative for adenopathy  Psychiatric/Behavioral: Negative for sleep disturbance  All other systems reviewed and are negative  Physical Exam:  Body mass index is 25 54 kg/m²  /70   Pulse 64   Ht 5' 8" (1 727 m)   Wt 76 2 kg (168 lb)   BMI 25 54 kg/m²    Wt Readings from Last 3 Encounters:   04/25/18 76 2 kg (168 lb)   04/17/18 73 9 kg (163 lb)   03/14/18 74 7 kg (164 lb 11 2 oz)       Physical Exam   Constitutional: He is oriented to person, place, and time  He appears well-developed and well-nourished  No distress  HENT:   Head: Normocephalic and atraumatic  Mouth/Throat: Oropharynx is clear and moist    Eyes: Conjunctivae and EOM are normal  Pupils are equal, round, and reactive to light  Neck: Normal range of motion  Neck supple   No thyromegaly present  Cardiovascular: Normal rate, regular rhythm and normal heart sounds  No murmur heard  Pulmonary/Chest: Effort normal and breath sounds normal  No respiratory distress  He has no wheezes  He has no rales  Abdominal: Soft  Bowel sounds are normal  He exhibits no distension  There is no tenderness  Musculoskeletal: Normal range of motion  He exhibits no edema  Lymphadenopathy:     He has no cervical adenopathy  Neurological: He is alert and oriented to person, place, and time  Skin: Skin is warm and dry  Psychiatric: He has a normal mood and affect  Vitals reviewed  Labs:     Component      Latest Ref Rng & Units 12/4/2017   TSH 3RD GENERATON      0 358 - 3 740 uIU/mL 0 497     Lab Results   Component Value Date     12/04/2017    K 3 9 12/04/2017     12/04/2017    CO2 27 12/04/2017    ANIONGAP 8 12/04/2017    BUN 16 12/04/2017    CREATININE 1 07 12/04/2017    GLUCOSE 99 12/04/2017    GLUF 94 11/09/2017    CALCIUM 9 7 12/04/2017    AST 20 12/04/2017    ALT 40 12/04/2017    ALKPHOS 57 12/04/2017    PROT 7 4 12/04/2017    BILITOT 0 48 12/04/2017    EGFR 68 12/04/2017       THYROID ULTRASOUND     INDICATION: Abnormal thyroid on carotid ultrasound     COMPARISON: None      TECHNIQUE:   Ultrasound of the thyroid was performed with a high frequency linear transducer in transverse and sagittal planes including volumetric imaging sweeps as well as traditional still imaging technique      FINDINGS:  Normal homogeneous smooth echotexture      Right lobe:  2 6 x 1 9 x 4 9 cm  Left lobe:  2 4 x 2 1 x 4 7 cm  Isthmus:      cm      Nodule #1  RIGHT lower pole nodule measuring 1 5 x 1 3 x 1 6 cm  Unchanged from prior  COMPOSITION:  2 points, solid or almost completely solid   ECHOGENICITY:  1 point, hyperechoic or isoechoic  SHAPE:  0 points, wider-than-tall  MARGIN: 0 points, smooth  ECHOGENIC FOCI:  0 points, none or large comet-tail artifacts    TI-RADS Score: TR 3 (3 points), Mildly suspicious  FNA if >2 5 cm  Follow if >1 5 cm           Nodule #2  Isthmus nodule measuring 1 3 x 2 8 x 2 5 cm  No priors for comparison  COMPOSITION:  2 points, solid or almost completely solid   ECHOGENICITY:  1 point, hyperechoic or isoechoic  SHAPE:  0 points, wider-than-tall  MARGIN: 0 points, smooth  ECHOGENIC FOCI:  0 points, none or large comet-tail artifacts  TI-RADS Score: TR 3 (3 points), Mildly suspicious  FNA if >2 5 cm  Follow if >1 5 cm           Nodule #3  LEFT upper pole nodule measuring 1 3 x 2 2 x 2 2 cm  No priors for comparison  COMPOSITION:  2 points, solid or almost completely solid   ECHOGENICITY:  1 point, hyperechoic or isoechoic  SHAPE:  0 points, wider-than-tall  MARGIN: 0 points, smooth  ECHOGENIC FOCI:  1 point, macrocalcifications  TI-RADS Score: TR 4 (4-6 points), Moderately suspicious  FNA if > 1 5 cm  Follow if > 1cm           Nodule #4  LEFT lower pole nodule measuring 1 2 x 1 1 x 1 3 cm  No priors for comparison  COMPOSITION:  2 points, solid or almost completely solid   ECHOGENICITY:  2 points, hypoechoic  SHAPE:  0 points, wider-than-tall  MARGIN: 0 points, smooth  ECHOGENIC FOCI:  0 points, none or large comet-tail artifacts  TI-RADS Score: TR 4 (4-6 points), Moderately suspicious  FNA if > 1 5 cm  Follow if > 1cm              IMPRESSION:     Multiple thyroid nodules   Nodules #2 and #3 meet ultrasound criteria for fine needle aspiration      Follow-up recommended for additional nodules      Case Report   Non-gynecologic Cytology                          Case: [id number]                                   Authorizing Provider: Jo Ann Vail MD          Collected:           03/30/2018 1530               Ordering Location:     [facility]        Received:            03/30/2018 [address] [place] [address] Ultrasound                                                          Pathologist:           Volodymyr Lizarraga MD                                                                Specimens:   A) - Thyroid, Left, upper pole                                                                       B) - Thyroid, Left, upper pole                                                                       C) - Thyroid, isthmus                                                                                D) - Thyroid, isthmus                                                                      Final Diagnosis   A B  Thyroid, Left, upper pole (ThinPrep and smear preparations):  Benign ([place] Category II) - See note  Consistent with a benign follicular nodule     Satisfactory for evaluation         C D  Thyroid, isthmus (ThinPrep and smear preparations):  Benign ([place] Category II) - See note  Consistent with a benign follicular nodule     Satisfactory for evaluation         Note (A-D)  As reported in the [address] [place] [address] for Reporting Thyroid Cytopathology*, the diagnostic category of "benign/negative for malignancy" carries 0% to 3% risk of malignancy being found in subsequent resections (in the few studies of patients with benign FNA results that were followed long-term, a falsenegative rate of <1% was reported), with the usual management being clinical follow-up supplemented by ultrasonography (US) as indicated  Repeat FNA may be indicated for nodules showing significant growth or developing US abnormalities  Ultimately, clinical/imaging correlation for this patient is needed in arriving at the actual management plan      *The [place] System for Reporting Thyroid Cytopathology, Wilfrid Acevedo (Dipti Streeter ), 2010 (1st ed )           Impression & Plan:    Problem List Items Addressed This Visit     Multiple thyroid nodules - Primary     FNA showed benign thyroid nodules, repeat US in 6 months            Relevant Orders    T4, free    TSH, 3rd generation    US thyroid          Orders Placed This Encounter Procedures    US thyroid     Standing Status:   Future     Standing Expiration Date:   4/25/2019     Scheduling Instructions:      No prep required  Please bring your physician order, insurance cards, a form of photo ID and a list of your medications with you  Arrive 15 minutes prior to your appointment time in order to register  Order Specific Question:   Reason for Exam:     Answer:   thyroid nodules    T4, free     Standing Status:   Future     Standing Expiration Date:   4/25/2019    TSH, 3rd generation     This is a patient instruction: This test is non-fasting  Please drink two glasses of water morning of bloodwork  Standing Status:   Future     Standing Expiration Date:   4/25/2019       Discussed with the patient and all questioned fully answered  He will call me if any problems arise  Follow-up appointment in 6 months       Counseled patient on diagnostic results, prognosis, risk and benefit of treatment options, instruction for management, importance of treatment compliance, Risk  factor reduction and impressions      Nate Grossman [address]

## 2018-04-25 ENCOUNTER — OFFICE VISIT (OUTPATIENT)
Dept: PHYSICAL THERAPY | Facility: MEDICAL CENTER | Age: 73
End: 2018-04-25
Payer: MEDICARE

## 2018-04-25 ENCOUNTER — OFFICE VISIT (OUTPATIENT)
Dept: ENDOCRINOLOGY | Facility: CLINIC | Age: 73
End: 2018-04-25
Payer: MEDICARE

## 2018-04-25 VITALS
DIASTOLIC BLOOD PRESSURE: 70 MMHG | HEIGHT: 68 IN | SYSTOLIC BLOOD PRESSURE: 122 MMHG | BODY MASS INDEX: 25.46 KG/M2 | HEART RATE: 64 BPM | WEIGHT: 168 LBS

## 2018-04-25 DIAGNOSIS — M25.511 ACUTE PAIN OF BOTH SHOULDERS: Primary | ICD-10-CM

## 2018-04-25 DIAGNOSIS — M25.512 ACUTE PAIN OF BOTH SHOULDERS: Primary | ICD-10-CM

## 2018-04-25 DIAGNOSIS — E04.2 MULTIPLE THYROID NODULES: Primary | ICD-10-CM

## 2018-04-25 PROCEDURE — G8991 OTHER PT/OT GOAL STATUS: HCPCS | Performed by: PHYSICAL THERAPIST

## 2018-04-25 PROCEDURE — 97140 MANUAL THERAPY 1/> REGIONS: CPT | Performed by: PHYSICAL THERAPIST

## 2018-04-25 PROCEDURE — 97112 NEUROMUSCULAR REEDUCATION: CPT | Performed by: PHYSICAL THERAPIST

## 2018-04-25 PROCEDURE — 97110 THERAPEUTIC EXERCISES: CPT | Performed by: PHYSICAL THERAPIST

## 2018-04-25 PROCEDURE — 99213 OFFICE O/P EST LOW 20 MIN: CPT | Performed by: NURSE PRACTITIONER

## 2018-04-25 PROCEDURE — G8990 OTHER PT/OT CURRENT STATUS: HCPCS | Performed by: PHYSICAL THERAPIST

## 2018-04-25 RX ORDER — NAPROXEN SODIUM 220 MG
220 TABLET ORAL 2 TIMES DAILY WITH MEALS
COMMUNITY
End: 2018-06-01

## 2018-04-25 NOTE — PROGRESS NOTES
Daily Note     Today's date: 2018  Patient name: Vickie Christine  : 1945  MRN: [id number]  Referring provider: Annemarie Langford MD  Dx:   Encounter Diagnosis     ICD-10-CM    1  Acute pain of both shoulders M25 511     M25 512                   Subjective: pt reported shoulders sore from building steps for [employer] [facility]  Objective: See treatment diary below      Assessment: Tolerated treatment well  Patient exhibited good technique with therapeutic exercises      Plan: Progress treatment as tolerated         Precautions: HTN    Daily Treatment Diary     Manual       [address] mobs gr III inf, post 5' 5'           PROM 5' 5' 10' 10' 15' 15' 15' 15'                                                Exercise Diary              Serratus punch 20 20 20 30 30 30 30 30     Prone rows 20 20 20 30 30 30 30 30     Prone ext 20 20 20 30 30 30 30 30                  Shoulder flex wand aarom supine 30 30 30 30 30 30 30 30     Table slides flex stand 20 20 20          Shoulder ext TB Gr 30 Gr 30 bl 30 bl 30 Gr 20 Bl 20 Bl 20 Bl 25     Rows TB Bl 30 Bl 30 30 bl 30 Gr 20 Bl 20 Bl 20 Bl 25     IR TB Gr 30 Gr 30 30 Gr 30 Gr 20 Gr 20 Gr 20 Gr 25     ER TB Gr 30 Gr 30 30 Gr 30 Gr 20 Gr 20 Gr 20 Gr 25     Flex AROM 20 20 20 30 20 20 20 25     Scaption AROM 20 20 20 30 20 20 20 25     Sleeper stretch     20"x3 20"x3 20"x3 20"x3                                                                                                    Modalities                           Cold pack R shoulder  10'  10'   10'

## 2018-04-27 ENCOUNTER — TELEPHONE (OUTPATIENT)
Dept: FAMILY MEDICINE CLINIC | Facility: CLINIC | Age: 73
End: 2018-04-27

## 2018-04-27 NOTE — TELEPHONE ENCOUNTER
Patient called in and questioned his metoprolol  He was on 50mg , 1/2 tab in am and 1 tab in pm  It was sent to the pharmacy as 25mg one tab twice daily   Spoke to ,, he said to give one tablet in the am and two tablets in the pm

## 2018-04-30 ENCOUNTER — OFFICE VISIT (OUTPATIENT)
Dept: PHYSICAL THERAPY | Facility: MEDICAL CENTER | Age: 73
End: 2018-04-30
Payer: MEDICARE

## 2018-04-30 DIAGNOSIS — M25.511 ACUTE PAIN OF BOTH SHOULDERS: Primary | ICD-10-CM

## 2018-04-30 DIAGNOSIS — M25.512 ACUTE PAIN OF BOTH SHOULDERS: Primary | ICD-10-CM

## 2018-04-30 PROCEDURE — 97140 MANUAL THERAPY 1/> REGIONS: CPT | Performed by: PHYSICAL THERAPIST

## 2018-04-30 PROCEDURE — 97110 THERAPEUTIC EXERCISES: CPT | Performed by: PHYSICAL THERAPIST

## 2018-04-30 PROCEDURE — 97112 NEUROMUSCULAR REEDUCATION: CPT | Performed by: PHYSICAL THERAPIST

## 2018-04-30 NOTE — PROGRESS NOTES
Daily Note     Today's date: 2018  Patient name: Pedro Kapoor  : 1945  MRN: [id number]  Referring provider: Shannan Camp MD  Dx:   Encounter Diagnosis     ICD-10-CM    1  Acute pain of both shoulders M25 511     M25 512                   Subjective: pt reported shoulders sore from building steps and when he reached across his body with his right arm  Objective: See treatment diary below      Assessment: Tolerated treatment well  Patient exhibited good technique with therapeutic exercises      Plan: Progress treatment as tolerated         Precautions: HTN    Daily Treatment Diary     Manual      [address] mobs gr III inf, post 5' 5'           PROM 5' 5' 10' 10' 15' 15' 15' 15 15'                                               Exercise Diary              Serratus punch 20 20 20 30 30 30 30 30 30    Prone rows 20 20 20 30 30 30 30 30 30    Prone ext 20 20 20 30 30 30 30 30 30                 Shoulder flex wand aarom supine 30 30 30 30 30 30 30 30 20    Table slides flex stand 20 20 20          Shoulder ext TB Gr 30 Gr 30 bl 30 bl 30 Gr 20 Bl 20 Bl 20 Bl 25 30    Rows TB Bl 30 Bl 30 30 bl 30 Gr 20 Bl 20 Bl 20 Bl 25 30    IR TB Gr 30 Gr 30 30 Gr 30 Gr 20 Gr 20 Gr 20 Gr 25 30    ER TB Gr 30 Gr 30 30 Gr 30 Gr 20 Gr 20 Gr 20 Gr 25 30    Flex AROM 20 20 20 30 20 20 20 25 30    Scaption AROM 20 20 20 30 20 20 20 25 30    Sleeper stretch     20"x3 20"x3 20"x3 20"x3              1xea                                                                                      Modalities                           Cold pack R shoulder  10'  10'   10'  10'

## 2018-05-02 ENCOUNTER — OFFICE VISIT (OUTPATIENT)
Dept: PHYSICAL THERAPY | Facility: MEDICAL CENTER | Age: 73
End: 2018-05-02
Payer: MEDICARE

## 2018-05-02 DIAGNOSIS — M25.511 ACUTE PAIN OF BOTH SHOULDERS: Primary | ICD-10-CM

## 2018-05-02 DIAGNOSIS — M25.512 ACUTE PAIN OF BOTH SHOULDERS: Primary | ICD-10-CM

## 2018-05-02 PROCEDURE — 97140 MANUAL THERAPY 1/> REGIONS: CPT | Performed by: PHYSICAL THERAPIST

## 2018-05-02 PROCEDURE — 97110 THERAPEUTIC EXERCISES: CPT | Performed by: PHYSICAL THERAPIST

## 2018-05-02 NOTE — PROGRESS NOTES
Daily Note     Today's date: 2018  Patient name: Karyn Davidson  : 1945  MRN: [id number]  Referring provider: Adela Danielson MD  Dx:   Encounter Diagnosis     ICD-10-CM    1  Acute pain of both shoulders M25 511     M25 512                   Subjective: pt reported shoulder continues to be sore from working on his staircase and deck      Objective: See treatment diary below      Assessment: Tolerated treatment well   Patient would benefit from continued PT      Plan: dec freq to 1x/week    Precautions: HTN    Daily Treatment Diary     Manual  4/2 4/4 4/9 4/11 4/16 4/18 4/23 4/25 4/30 5/3   [facility] mobs gr III inf, post 5' 5'           PROM 5' 5' 10' 10' 15' 15' 15' 15' 15' 15'                                              Exercise Diary              Serratus punch 20 20 20 30 30 30 30 30 30 30   Prone rows 20 20 20 30 30 30 30 30 30 30   Prone ext 20 20 20 30 30 30 30 30 30 30                Shoulder flex wand aarom supine 30 30 30 30 30 30 30 30 20 20   Table slides flex stand 20 20 20          Shoulder ext TB Gr 30 Gr 30 bl 30 bl 30 Gr 20 Bl 20 Bl 20 Bl 25 30 Bl 30   Rows TB Bl 30 Bl 30 30 bl 30 Gr 20 Bl 20 Bl 20 Bl 25 30 30   IR TB Gr 30 Gr 30 30 Gr 30 Gr 20 Gr 20 Gr 20 Gr 25 30 Bl 30   ER TB Gr 30 Gr 30 30 Gr 30 Gr 20 Gr 20 Gr 20 Gr 25 30 Bl 30   Flex AROM 20 20 20 30 20 20 20 25 30 30   Scaption AROM 20 20 20 30 20 20 20 25 30 30   Sleeper stretch     20"x3 20"x3 20"x3 20"x3     Wall circles         1xea 1x ea                                                                                     Modalities                           Cold pack R shoulder  10'  10'   10'  10' 10'

## 2018-05-09 ENCOUNTER — OFFICE VISIT (OUTPATIENT)
Dept: PHYSICAL THERAPY | Facility: MEDICAL CENTER | Age: 73
End: 2018-05-09
Payer: MEDICARE

## 2018-05-09 DIAGNOSIS — M25.512 ACUTE PAIN OF BOTH SHOULDERS: Primary | ICD-10-CM

## 2018-05-09 DIAGNOSIS — M25.511 ACUTE PAIN OF BOTH SHOULDERS: Primary | ICD-10-CM

## 2018-05-09 PROCEDURE — 97140 MANUAL THERAPY 1/> REGIONS: CPT | Performed by: PHYSICAL THERAPIST

## 2018-05-09 PROCEDURE — 97110 THERAPEUTIC EXERCISES: CPT | Performed by: PHYSICAL THERAPIST

## 2018-05-09 NOTE — PROGRESS NOTES
PT Re-Evaluation     Today's date: 2018  Patient name: Elodia Ochoa  : 1945  MRN: [id number]  Referring provider: Patrick Denver, MD  Dx:   Encounter Diagnosis     ICD-10-CM    1  Acute pain of both shoulders M25 511     M25 512                   Assessment  Impairments: abnormal or restricted ROM, impaired physical strength and pain with function    Assessment details: Elodia Ochoa has been compliant with attending PT and home exercise program since initial eval   Steve Farley  has made improvements in objective data since initial eval but is still limited compared to prior level of function  Steve Farley continues with above listed impairments and would benefit from additional skilled PT to address these deficits to return to prior level of function      Understanding of Dx/Px/POC: good   Prognosis: good    Goals  Impairment Goals  - Decrease pain to 0-10 - not met  - Improve ROM equal to [facility] - met   - Increase strength equal to 5/5 B UE - not met    Functional Goals  - Increase Functional Status Measure to: 79 - not met  - Patient will be independent with comprehensive HEP - met  - Patient will be able to sleep without waking 2* pain - met  - Patient will be able to reach for object overhead - met  - Patient will be able to lift/carry objects without provocation of symptoms - met        Plan  Patient would benefit from: skilled PT  Referral necessary: No  Planned therapy interventions: home exercise program, manual therapy, neuromuscular re-education, patient education, functional ROM exercises, strengthening, stretching and joint mobilization  Frequency: 2x week  Duration in weeks: 12  Treatment plan discussed with: patient        Subjective Evaluation    History of Present Illness  Date of onset: 2017  Mechanism of injury: trauma  Not a recurrent problem   Pain  Current pain ratin  At best pain ratin  At worst pain rating: 3  Progression: improved      Diagnostic Tests  X-ray: normal  MRI studies: abnormal  Treatments  Current treatment: physical therapy  Patient Goals  Patient goals for therapy: increased strength, decreased pain and return to sport/leisure activities          Objective     Tenderness     Left Shoulder   No tenderness     Right Shoulder  No tenderness     Cervical/Thoracic Screen   Cervical range of motion within normal limits    Active Range of Motion   Left Shoulder   Flexion: 170 degrees   External rotation BTH: C5   Internal rotation BTB: L5 with pain    Right Shoulder   Flexion: 165 degrees   External rotation BTH: C5   Internal rotation BTB: L5 with pain    Passive Range of Motion   Left Shoulder   Flexion: 180 degrees   Abduction: 180 degrees   External rotation 45°: 85 degrees   Internal rotation 90°: 60 degrees     Right Shoulder   Flexion: 180 degrees   Abduction: 180 degrees   External rotation 45°: 85 degrees   Internal rotation 90°: 60 degrees     Joint Play   Left Shoulder  Joints within functional limits are the inferior capsule  Right Shoulder  Joints within functional limits are the inferior capsule  Strength/Myotome Testing     Left Shoulder     Planes of Motion   Flexion: 4+   Extension: 5   External rotation at 90°: 4+   Internal rotation at 90°: 5     Isolated Muscles   Infraspinatus: 4+   Lower trapezius: 4   Middle trapezius: 4   Supraspinatus: 4+     Right Shoulder     Planes of Motion   Flexion: 4+   Extension: 5   External rotation at 90°: 4+   Internal rotation at 90°: 5     Isolated Muscles   Infraspinatus: 4+   Lower trapezius: 4   Middle trapezius: 4   Supraspinatus: 4+     Tests     Left Shoulder   Negative AC shear, active compression ([place]), anterior slide, posterior load and shift and anterior slide   Right Shoulder   Negative AC shear, active compression ([place]), anterior slide, posterior load and shift and anterior slide        Precautions: HTN    Daily Treatment Diary     Manual  5/9         5/3   [facility] III inf, post             PROM 15'         15'                                              Exercise Diary              Serratus punch 30         30   Prone rows 30         30   Prone ext 30         30                Shoulder flex wand aarom supine 30         20   Table slides flex stand             Shoulder ext TB Bl 30         Bl 30   Rows TB Bl 30         30   IR TB Bl 30         Bl 30   ER TB Bl 30         Bl 30   Flex AROM 30         30   Scaption AROM 30         30   Sleeper stretch             Wall circles 1x ea         1x ea                                                                                     Modalities                           Cold pack R shoulder  10'  10'   10'  10' 10'

## 2018-05-10 DIAGNOSIS — R97.20 ELEVATED PROSTATE SPECIFIC ANTIGEN (PSA): ICD-10-CM

## 2018-05-16 ENCOUNTER — OFFICE VISIT (OUTPATIENT)
Dept: PHYSICAL THERAPY | Facility: MEDICAL CENTER | Age: 73
End: 2018-05-16
Payer: MEDICARE

## 2018-05-16 DIAGNOSIS — M25.511 ACUTE PAIN OF BOTH SHOULDERS: Primary | ICD-10-CM

## 2018-05-16 DIAGNOSIS — M25.512 ACUTE PAIN OF BOTH SHOULDERS: Primary | ICD-10-CM

## 2018-05-16 PROCEDURE — 97110 THERAPEUTIC EXERCISES: CPT | Performed by: PHYSICAL THERAPIST

## 2018-05-16 PROCEDURE — G8992 OTHER PT/OT  D/C STATUS: HCPCS | Performed by: PHYSICAL THERAPIST

## 2018-05-16 PROCEDURE — 97140 MANUAL THERAPY 1/> REGIONS: CPT | Performed by: PHYSICAL THERAPIST

## 2018-05-16 PROCEDURE — G8991 OTHER PT/OT GOAL STATUS: HCPCS | Performed by: PHYSICAL THERAPIST

## 2018-05-16 NOTE — PROGRESS NOTES
Daily Note     Today's date: 2018  Patient name: Adan Owen  : 1945  MRN: [id number]  Referring provider: Berkley Alvarez MD  Dx:   Encounter Diagnosis     ICD-10-CM    1  Acute pain of both shoulders M25 511     M25 512                   Subjective: Hugo Brooke reports his shoulder has been feeling good and has been able to return to all activities with minimal to no discomfort  Objective: See treatment diary below      Assessment: Tolerated treatment well  Patient exhibited good technique with therapeutic exercises      Plan: Adan Owen has been compliant with attending PT and home exercise program since initial eval   Hugo Brooke  has made improvements in objective data since initial evalulation and has achieved all goals  Patient reports having returned to their prior level or function  It was mutually agreed to Discharge to home exercise program at this time        Precautions: HTN    Daily Treatment Diary     Manual             [address] [place] [address] gr III inf, post             PROM 15' 15'                                                      Exercise Diary              Serratus punch 30 30           Prone rows 30 30           Prone ext 30 30                        Shoulder flex wand aarom supine 30 30           Table slides flex stand             Shoulder ext TB Bl 30 30           Rows TB Bl 30 30           IR TB Bl 30 30           ER TB Bl 30 30           Flex AROM 30 30           Scaption AROM 30 30           Sleeper stretch             Wall circles 1x ea 1x                                                                                             Modalities                           Cold pack R shoulder  10'  10'   10'  10' 10'

## 2018-05-29 ENCOUNTER — OFFICE VISIT (OUTPATIENT)
Dept: OBGYN CLINIC | Facility: MEDICAL CENTER | Age: 73
End: 2018-05-29
Payer: MEDICARE

## 2018-05-29 ENCOUNTER — APPOINTMENT (OUTPATIENT)
Dept: LAB | Facility: MEDICAL CENTER | Age: 73
End: 2018-05-29
Payer: MEDICARE

## 2018-05-29 VITALS
WEIGHT: 160 LBS | DIASTOLIC BLOOD PRESSURE: 71 MMHG | SYSTOLIC BLOOD PRESSURE: 147 MMHG | HEIGHT: 68 IN | BODY MASS INDEX: 24.25 KG/M2 | HEART RATE: 62 BPM

## 2018-05-29 DIAGNOSIS — R97.20 ELEVATED PROSTATE SPECIFIC ANTIGEN (PSA): ICD-10-CM

## 2018-05-29 DIAGNOSIS — M75.52 SUBACROMIAL BURSITIS OF LEFT SHOULDER JOINT: Primary | ICD-10-CM

## 2018-05-29 DIAGNOSIS — M19.011 OSTEOARTHRITIS OF RIGHT ACROMIOCLAVICULAR JOINT: ICD-10-CM

## 2018-05-29 LAB — PSA SERPL-MCNC: 2.2 NG/ML (ref 0–4)

## 2018-05-29 PROCEDURE — 99213 OFFICE O/P EST LOW 20 MIN: CPT | Performed by: ORTHOPAEDIC SURGERY

## 2018-05-29 PROCEDURE — 84153 ASSAY OF PSA TOTAL: CPT

## 2018-05-29 NOTE — PROGRESS NOTES
Orthopaedic Surgery - Office Note  Matthieu Childers (15 y o  male)   : 1945   MRN: [id number]  Encounter Date: 2018    Chief Complaint   Patient presents with    Right Shoulder - Follow-up       Assessment / Plan  Bilateral shoulder pain  Right shoulder severe AC joint arthritis with mild to moderate glenohumeral arthritis and rotator cuff tendinitis  Subacromial bursitis left shoulder    · After discussion of treatment options the patient agreed he would like to continue with conservative treatment  He will continue with home physical therapy for both shoulders which would include scapular stabilization exercises  · Continue with ice and analgesics as needed  Return if symptoms worsen or fail to improve  We did discuss potentially injecting the subacromial space in his left shoulder if it continues to bother him  History of Present Illness  Matthieu Childers is a 74 y o  male who presents for evaluation of bilateral shoulder pain that started following a fall while skiing on 2017  Initially the patient denies any issues with his shoulder but 2 weeks following this fall he slowly started with pain at night and then during the day with movement over the next few weeks  Initially treated with over-the-counter NSAIDs including Aleve which does still helps with the discomfort and he has continued to do physical therapy on a outpatient basis for approximately 2 and half months  At this time he is doing home exercises as his progress from outpatient therapy and feels that he is progressing still forward but slowly  He feels that the nighttime discomfort in his shoulders has resolved at this time  Review of Systems  Pertinent items are noted in HPI  All other systems were reviewed and are negative  Physical Exam  /71   Pulse 62   Ht 5' 8" (1 727 m)   Wt 72 6 kg (160 lb)   BMI 24 33 kg/m²   Cons: Appears well  No apparent distress  Psych: Alert  Oriented x3    Mood and affect normal   Eyes: PERRLA, EOMI  Resp: Normal effort  No audible wheezing or stridor  CV: Palpable pulse  No discernable arrhythmia  No LE edema  Lymph:  No palpable cervical, axillary, or inguinal lymphadenopathy  Skin: Warm  No palpable masses  No visible lesions  Neuro: Normal muscle tone  Normal and symmetric DTR's  Bilateral Shoulder Exam  Alignment / Posture:  Normal cervical alignment  Normal shoulder posture  Normal scapular position  Inspection:  No swelling  No edema  No deformity  Palpation:  Moderate tenderness at The right AC joint and mild tenderness in the anterior lateral aspect of the shoulder and the subacromial space and in the area of the biceps tendon on the right  On the left he does have mild tenderness over the [facility] joint and in the area of the bicipital groove     ROM:  Normal neck ROM  Shoulder ° bilaterally  Shoulder ER 85° bilaterally  Shoulder IR To T12 bilaterally  Strength:  5/5 supraspinatus, infraspinatus, and subscapularis bilaterally with some discomfort especially in the right through all movements with resistance  Stability:  No objective shoulder instability  Tests: (+) Vang  (+) Painful arc  (+) Speed  (+) [place]  (+) Internal impingement test   On the right shoulder pain was all concentrated over the [facility] joint  In the left these tests are also positive the pain was more in the subacromial space/biceps tendon area  (-) [place]  Neurovascular:  Sensation intact in Ax/R/M/U nerve distributions  Sensation intact in all digital nerve distributions  Fingers warm and perfused  Studies Reviewed  XR of right shoulder - Moderate narrowing of the right AC joint with spurring noted  No arthritic changes at the glenohumeral noted this time  No fractures or dislocations seen  MRI of right shoulder - Shows moderate glenohumeral chondromalacia with mild bony changes of osteoarthritis    There is severe acromioclavicular degenerative changes noted including edema across the joint space  There is partial-thickness insertional tearing of the subscap tendon  There is a supraspinatus and infraspinatus tendinopathy  Tenosynovitis of the long head of the biceps tendon noted with possible partial tear  Subacromial-subdeltoid bursitis noted without high-grade supraspinatus or infraspinatus tear  Procedures       Medical, Surgical, Family, and Social History  The patient's medical history, family history, and social history, were reviewed and updated as appropriate  Past Medical History:   Diagnosis Date    Closed fracture of one rib     Last Assessed:  10/20/13    Hyperlipidemia     Hypertension        Past Surgical History:   Procedure Laterality Date    NO PAST SURGERIES      TONSILLECTOMY         Family History   Problem Relation Age of Onset    Stroke Mother     Alcohol abuse Father        Social History     Occupational History    RETIRED      Social History Main Topics    Smoking status: Never Smoker    Smokeless tobacco: Never Used    Alcohol use No      Comment: As per [employer]:  Social drinker    Drug use: No    Sexual activity: Not on file       No Known Allergies      Current Outpatient Prescriptions:     ascorbic acid (VITAMIN C) 500 mg tablet, Take 500 mg by mouth daily, Disp: , Rfl:     aspirin 81 MG tablet, Take 81 mg by mouth daily  , Disp: , Rfl:     Cholecalciferol (VITAMIN D3) 78478 units TABS, Take by mouth, Disp: , Rfl:     hydrochlorothiazide (HYDRODIURIL) 25 mg tablet, Take 0 5 tablets (12 5 mg total) by mouth daily, Disp: 45 tablet, Rfl: 1    naproxen sodium (ALEVE) 220 MG tablet, Take 220 mg by mouth 2 (two) times a day with meals, Disp: , Rfl:     RESTASIS 0 05 % ophthalmic emulsion, , Disp: , Rfl:     metoprolol tartrate (LOPRESSOR) 25 mg tablet, Take 1 tablet (25 mg total) by mouth 2 (two) times a day for 30 days, Disp: 180 tablet, Rfl: 1      Branden Damon PA-C    Scribe Attestation    I,:    am acting as a scribe while in the presence of the attending physician :        I,:    personally performed the services described in this documentation    as scribed in my presence :

## 2018-06-01 ENCOUNTER — OFFICE VISIT (OUTPATIENT)
Dept: FAMILY MEDICINE CLINIC | Facility: CLINIC | Age: 73
End: 2018-06-01
Payer: MEDICARE

## 2018-06-01 VITALS
BODY MASS INDEX: 25.24 KG/M2 | SYSTOLIC BLOOD PRESSURE: 112 MMHG | WEIGHT: 166 LBS | HEART RATE: 72 BPM | DIASTOLIC BLOOD PRESSURE: 60 MMHG

## 2018-06-01 DIAGNOSIS — I10 ESSENTIAL HYPERTENSION: Primary | ICD-10-CM

## 2018-06-01 DIAGNOSIS — R97.20 ELEVATED PROSTATE SPECIFIC ANTIGEN (PSA): ICD-10-CM

## 2018-06-01 DIAGNOSIS — M19.011 OSTEOARTHRITIS OF RIGHT ACROMIOCLAVICULAR JOINT: ICD-10-CM

## 2018-06-01 DIAGNOSIS — M75.52 SUBACROMIAL BURSITIS OF LEFT SHOULDER JOINT: ICD-10-CM

## 2018-06-01 PROBLEM — J06.9 VIRAL UPPER RESPIRATORY ILLNESS: Status: RESOLVED | Noted: 2018-02-05 | Resolved: 2018-06-01

## 2018-06-01 PROCEDURE — 99214 OFFICE O/P EST MOD 30 MIN: CPT | Performed by: FAMILY MEDICINE

## 2018-06-01 RX ORDER — MELOXICAM 15 MG/1
15 TABLET ORAL DAILY
Qty: 90 TABLET | Refills: 0 | Status: SHIPPED | OUTPATIENT
Start: 2018-06-01 | End: 2018-07-20 | Stop reason: SDUPTHER

## 2018-06-01 NOTE — PATIENT INSTRUCTIONS
Problem List Items Addressed This Visit        Cardiovascular and Mediastinum    Hypertension - Primary     Stable at the moment  No changes  Blood pressure today is quite good  Musculoskeletal and Integument    Osteoarthritis of right acromioclavicular joint     Patient continues to have some right AC joint irritation, as well as left subacromial bursitis  Recommend meloxicam, at patient request   This would replace the naproxen  Follow-up in the future as needed  Three month supply sent to mail-in pharmacy  Subacromial bursitis of left shoulder joint     Stable  Patient does continue to have some symptoms  I would recommend continuing to treat  Patient requested meloxicam, which is entirely reasonable  Replace Aleve with meloxicam          Relevant Medications    meloxicam (MOBIC) 15 mg tablet       Other    Elevated prostate specific antigen (PSA)     Digital rectal exam today was negative  Follow-up in 6 months with repeat blood work to get him back on track, and then recheck yearly after that  SINDY should be deferred until November of 2019           Relevant Orders    PSA Total, Diagnostic

## 2018-06-01 NOTE — ASSESSMENT & PLAN NOTE
Digital rectal exam today was negative  Follow-up in 6 months with repeat blood work to get him back on track, and then recheck yearly after that  SINDY should be deferred until November of 2019

## 2018-06-01 NOTE — PROGRESS NOTES
Assessment/Plan:    Elevated prostate specific antigen (PSA)  Digital rectal exam today was negative  Follow-up in 6 months with repeat blood work to get him back on track, and then recheck yearly after that  SINDY should be deferred until November of 2019  Hypertension  Stable at the moment  No changes  Blood pressure today is quite good  Hyperlipidemia  Stable  Not currently on medications, none recommended, due for labs in November  Osteoarthritis of right acromioclavicular joint  Patient continues to have some right AC joint irritation, as well as left subacromial bursitis  Recommend meloxicam, at patient request   This would replace the naproxen  Follow-up in the future as needed  Three month supply sent to mail-in pharmacy  Subacromial bursitis of left shoulder joint  Stable  Patient does continue to have some symptoms  I would recommend continuing to treat  Patient requested meloxicam, which is entirely reasonable  Replace Aleve with meloxicam        Diagnoses and all orders for this visit:    Essential hypertension    Elevated prostate specific antigen (PSA)  -     PSA Total, Diagnostic; Future    Osteoarthritis of right acromioclavicular joint    Subacromial bursitis of left shoulder joint  -     meloxicam (MOBIC) 15 mg tablet; Take 1 tablet (15 mg total) by mouth daily for 90 days          Subjective: patient here for a 3 month f/u re: HTN, s/p shoulder injury  ak     Patient ID: Juanjose Ceballos is a 74 y o  male  Patient has BPH, and has been good  PSA reviewed  Please see copies on the chart  Patient's blood pressure is 112/60, and he is currently on hydrochlorothiazide, and Lopressor  For the patient's [facility] joint, he is using Aleve at 1 tablet a day  He was through physical therapy  He feels that it is better than where was, but not normal   He still is noticing some crepitation in the joint    He did mention that he also has been doing some increased work recently, and with that he will notice an increase in symptoms  It is not all the time, however  Discomfort was initially on the right, but is also now bilateral   He did go to Ortho  No further treatment was recommended  We did review the side effects and problems with nonsteroidal anti-inflammatories, and Aleve in specific  The following portions of the patient's history were reviewed and updated as appropriate: allergies, current medications, past family history, past medical history, past social history, past surgical history and problem list     Review of Systems   Constitutional: Negative  HENT: Negative  Respiratory: Negative  Cardiovascular: Negative  Gastrointestinal: Negative  Genitourinary: Negative for difficulty urinating, dysuria and urgency  Musculoskeletal:        Per HPI   All other systems reviewed and are negative  PSA from the 29th of May was 2 2  In April of 2017 it was 1 8  Objective:      /60   Pulse 72   Wt 75 3 kg (166 lb)   BMI 25 24 kg/m²          Physical Exam   Constitutional: He appears well-developed and well-nourished  HENT:   Head: Normocephalic and atraumatic  Neck: Normal range of motion  Neck supple  Cardiovascular: Normal rate, regular rhythm and normal heart sounds  Exam reveals no gallop and no friction rub  No murmur heard  Pulses:       Carotid pulses are 2+ on the right side, and 2+ on the left side  Pulmonary/Chest: Effort normal and breath sounds normal  No respiratory distress  He has no wheezes  He has no rales  Genitourinary: Rectal exam shows external hemorrhoid  Rectal exam shows no internal hemorrhoid, no fissure, no mass, no tenderness, anal tone normal and guaiac negative stool  Prostate is enlarged  Prostate is not tender  Nursing note and vitals reviewed

## 2018-06-01 NOTE — ASSESSMENT & PLAN NOTE
Stable  Patient does continue to have some symptoms  I would recommend continuing to treat  Patient requested meloxicam, which is entirely reasonable    Replace Aleve with meloxicam

## 2018-06-20 ENCOUNTER — OFFICE VISIT (OUTPATIENT)
Dept: FAMILY MEDICINE CLINIC | Facility: CLINIC | Age: 73
End: 2018-06-20
Payer: MEDICARE

## 2018-06-20 VITALS
HEART RATE: 88 BPM | DIASTOLIC BLOOD PRESSURE: 80 MMHG | SYSTOLIC BLOOD PRESSURE: 154 MMHG | BODY MASS INDEX: 25.31 KG/M2 | WEIGHT: 167 LBS | HEIGHT: 68 IN | TEMPERATURE: 98.1 F

## 2018-06-20 DIAGNOSIS — R42 VERTIGO: Primary | ICD-10-CM

## 2018-06-20 PROCEDURE — 99213 OFFICE O/P EST LOW 20 MIN: CPT | Performed by: FAMILY MEDICINE

## 2018-06-20 RX ORDER — MECLIZINE HYDROCHLORIDE 25 MG/1
25 TABLET ORAL EVERY 8 HOURS PRN
Qty: 30 TABLET | Refills: 0 | Status: SHIPPED | OUTPATIENT
Start: 2018-06-20 | End: 2018-12-03

## 2018-06-20 NOTE — PROGRESS NOTES
Assessment/Plan:    Vertigo  Patient does have what appears to be benign positional vertigo  Neurologic exam today was normal   I would recommend that he try meclizine, 1 every 8 hr as needed  Reviewed the side effects of meclizine including increased sedation  I would also recommend physical therapy for vestibular rehab  With regard to medication, the meloxicam is unlikely of caused a significant problem for him  Diagnoses and all orders for this visit:    Vertigo  -     Ambulatory referral to Physical Therapy; Future          Subjective:   C/o "vertigo" started 2 nights ago  Started the Meloxicam Sunday  He read the side effects which it could be one, so he stopped it yesterday  s     Patient ID: Sarah Meigs is a 74 y o  male  Please see chief complaint  Patient woke up on 2 separate nights, noticed some spinning  He was looking off to the side  He then went to the head back up to the front  In AM he was able to look up if he looked straight ahead  Later it got better  The following portions of the patient's history were reviewed and updated as appropriate: allergies, current medications and problem list     Review of Systems   Constitutional: Negative  HENT:        Per HPI   Neurological:        Per HPI         Objective:      /80   Pulse 88   Temp 98 1 °F (36 7 °C)   Ht 5' 8" (1 727 m)   Wt 75 8 kg (167 lb)   BMI 25 39 kg/m²          Physical Exam   Constitutional: He is oriented to person, place, and time  He appears well-developed and well-nourished  HENT:   Head: Normocephalic and atraumatic  Right Ear: External ear normal    Left Ear: External ear normal    Eyes: Conjunctivae are normal  Pupils are equal, round, and reactive to light  Neck: Normal range of motion  Neck supple  Cardiovascular: Normal rate, regular rhythm and normal heart sounds  Exam reveals no gallop and no friction rub  No murmur heard    Pulses:       Carotid pulses are 2+ on the right side, and 2+ on the left side  Pulmonary/Chest: Effort normal and breath sounds normal  No respiratory distress  He has no wheezes  He has no rales  Neurological: He is alert and oriented to person, place, and time  He has normal reflexes  He displays normal reflexes  No cranial nerve deficit  He exhibits normal muscle tone  Coordination normal    Nursing note and vitals reviewed

## 2018-06-20 NOTE — PATIENT INSTRUCTIONS
Problem List Items Addressed This Visit        Other    Vertigo - Primary     Patient does have what appears to be benign positional vertigo  Neurologic exam today was normal   I would recommend that he try meclizine, 1 every 8 hr as needed  Reviewed the side effects of meclizine including increased sedation  I would also recommend physical therapy for vestibular rehab  With regard to medication, the meloxicam is unlikely of caused a significant problem for him           Relevant Orders    Ambulatory referral to Physical Therapy

## 2018-06-20 NOTE — ASSESSMENT & PLAN NOTE
Patient does have what appears to be benign positional vertigo  Neurologic exam today was normal   I would recommend that he try meclizine, 1 every 8 hr as needed  Reviewed the side effects of meclizine including increased sedation  I would also recommend physical therapy for vestibular rehab  With regard to medication, the meloxicam is unlikely of caused a significant problem for him

## 2018-06-27 ENCOUNTER — EVALUATION (OUTPATIENT)
Dept: PHYSICAL THERAPY | Facility: REHABILITATION | Age: 73
End: 2018-06-27
Payer: MEDICARE

## 2018-06-27 DIAGNOSIS — R42 VERTIGO: ICD-10-CM

## 2018-06-27 PROCEDURE — 97161 PT EVAL LOW COMPLEX 20 MIN: CPT | Performed by: PHYSICAL THERAPIST

## 2018-06-27 PROCEDURE — G8979 MOBILITY GOAL STATUS: HCPCS | Performed by: PHYSICAL THERAPIST

## 2018-06-27 PROCEDURE — G8978 MOBILITY CURRENT STATUS: HCPCS | Performed by: PHYSICAL THERAPIST

## 2018-06-27 PROCEDURE — 95992 CANALITH REPOSITIONING PROC: CPT | Performed by: PHYSICAL THERAPIST

## 2018-06-27 NOTE — PROGRESS NOTES
PT Evaluation     Today's date: 2018  Patient name: Antonette Wright  : 1945  MRN: [id number]  Referring provider: Valdez Leiva MD  Dx:   Encounter Diagnosis     ICD-10-CM    1  Vertigo R42 Ambulatory referral to Physical Therapy                  Assessment  Other impairment: positional vertigo    Assessment details: + left horizontal with correction performed times 1 and was negative upon return to position of initiation  Will recheck in 48 hours  Understanding of Dx/Px/POC: excellent  Goals  STG without vertigo  LTG I ADLs   Reposition in bed Independently    Plan  Patient would benefit from: skilled physical therapy  Planned therapy interventions: canalith repositioning  Frequency: 2x week  Duration in weeks: 2  Treatment plan discussed with: patient  Plan details: Continue canalith repositioning as needed with recheck in 48 hours        Subjective Evaluation    History of Present Illness  Mechanism of injury: Started about 2 weeks while riding a bike at the gym and then was in bed and rolled to the left and had spinning     Pain  No pain reported    Treatments  No previous or current treatments  Patient Goals  Patient goals for therapy: independence with ADLs/IADLs  Patient goal: without vertigo        Objective     Static Posture     Comments  Eye exam - smooth pursuit, convergence, spontaneous all negative  Canals all 4 positive with left horizontal > right posterior> right horizontal> left posterior    Neuro Exam :     Dizziness   Onset and Quality:  Yesterday  Chronicity:  New  Duration:  Seconds  Description:  Room spinning  Associated with:  Rolling over    Neurologic Exam    Precautions: HBP    Daily Treatment Diary     Manual              Left horizontal + correction x 1                                                                    Exercise Diary Modalities                                                               Flowsheet Rows      Most Recent Value   PT/OT G-Codes   Current Score  86   Projected Score  91   FOTO information reviewed  Yes   Assessment Type  Evaluation   G code set  Mobility: Walking & Moving Around   Mobility: Walking and Moving Around Current Status ()  CI   Mobility: Walking and Moving Around Goal Status ()  CI

## 2018-06-29 ENCOUNTER — OFFICE VISIT (OUTPATIENT)
Dept: PHYSICAL THERAPY | Facility: REHABILITATION | Age: 73
End: 2018-06-29
Payer: MEDICARE

## 2018-06-29 DIAGNOSIS — R42 VERTIGO: Primary | ICD-10-CM

## 2018-06-29 PROCEDURE — G8979 MOBILITY GOAL STATUS: HCPCS | Performed by: PHYSICAL THERAPIST

## 2018-06-29 PROCEDURE — G8980 MOBILITY D/C STATUS: HCPCS | Performed by: PHYSICAL THERAPIST

## 2018-06-29 PROCEDURE — 97112 NEUROMUSCULAR REEDUCATION: CPT | Performed by: PHYSICAL THERAPIST

## 2018-06-29 NOTE — PROGRESS NOTES
Daily Note     Today's date: 2018  Patient name: Cortez Wade  : 1945  MRN: [id number]  Referring provider: Jamee Wise MD  Dx:   Encounter Diagnosis     ICD-10-CM    1  Vertigo R42        Start Time: 631  Stop Time:   Total time in clinic (min): 10 minutes    Subjective: I feel great      Objective: See treatment diary below  Precautions: HBP     Daily Treatment Diary      Manual                     Left horizontal + correction x 1  negative                                                                                                                         Exercise Diary                         habituation horizontal   2 cycles 30 secs ea                                                                                                                                                                                                                                                                                                                                                                                                                                                                                                   Assessment: Tolerated treatment well  Patient exhibited good technique with therapeutic exercises  No nystagmus      Plan: place on hold for 2 weeks in case of flare up   Patient has been vertigo for 2 weeks and has met all goals at this time and will be discharged to home program

## 2018-07-20 DIAGNOSIS — M75.52 SUBACROMIAL BURSITIS OF LEFT SHOULDER JOINT: ICD-10-CM

## 2018-07-21 RX ORDER — MELOXICAM 15 MG/1
TABLET ORAL
Qty: 90 TABLET | Refills: 0 | Status: SHIPPED | OUTPATIENT
Start: 2018-07-21 | End: 2018-11-29 | Stop reason: SDUPTHER

## 2018-07-24 ENCOUNTER — OFFICE VISIT (OUTPATIENT)
Dept: OBGYN CLINIC | Facility: MEDICAL CENTER | Age: 73
End: 2018-07-24
Payer: MEDICARE

## 2018-07-24 VITALS
HEIGHT: 68 IN | HEART RATE: 55 BPM | WEIGHT: 167 LBS | DIASTOLIC BLOOD PRESSURE: 77 MMHG | BODY MASS INDEX: 25.31 KG/M2 | SYSTOLIC BLOOD PRESSURE: 147 MMHG

## 2018-07-24 DIAGNOSIS — M75.51 SUBACROMIAL BURSITIS OF RIGHT SHOULDER JOINT: Primary | ICD-10-CM

## 2018-07-24 PROCEDURE — 99214 OFFICE O/P EST MOD 30 MIN: CPT | Performed by: ORTHOPAEDIC SURGERY

## 2018-07-24 PROCEDURE — 20610 DRAIN/INJ JOINT/BURSA W/O US: CPT | Performed by: ORTHOPAEDIC SURGERY

## 2018-07-24 RX ORDER — METHYLPREDNISOLONE ACETATE 40 MG/ML
1 INJECTION, SUSPENSION INTRA-ARTICULAR; INTRALESIONAL; INTRAMUSCULAR; SOFT TISSUE
Status: COMPLETED | OUTPATIENT
Start: 2018-07-24 | End: 2018-07-24

## 2018-07-24 RX ORDER — BUPIVACAINE HYDROCHLORIDE 2.5 MG/ML
4 INJECTION, SOLUTION INFILTRATION; PERINEURAL
Status: COMPLETED | OUTPATIENT
Start: 2018-07-24 | End: 2018-07-24

## 2018-07-24 RX ADMIN — METHYLPREDNISOLONE ACETATE 1 ML: 40 INJECTION, SUSPENSION INTRA-ARTICULAR; INTRALESIONAL; INTRAMUSCULAR; SOFT TISSUE at 13:01

## 2018-07-24 RX ADMIN — BUPIVACAINE HYDROCHLORIDE 4 ML: 2.5 INJECTION, SOLUTION INFILTRATION; PERINEURAL at 13:01

## 2018-07-24 NOTE — PROGRESS NOTES
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 and rotator cuff tendinitis  Subacromial bursitis left shoulder    · Subacromial steroid injection was advised accepted administered as outlined below  · Continue ice and analgesics as needed  · Follow-up in 3 months for repeat examination      History of Present Illness  Ben Hope is a 74 y o  male who presents follow up of right shoulder pain and arthritis  He received a steroid injection into the right [facility] joint this past April which gave him moderate relief for approximately 4 weeks  He was uses the arm more than usual and has had slow onset of anterior shoulder pain  This made worse with direct contact to the proximal aspect of the arm and attempted overhead motion  Denies any new injury numbness tingling or significant weakness  Review of Systems  Pertinent items are noted in HPI  All other systems were reviewed and are negative  Physical Exam  /77   Pulse 55   Ht 5' 8" (1 727 m)   Wt 75 8 kg (167 lb)   BMI 25 39 kg/m²   Cons: Appears well  No apparent distress  Psych: Alert  Oriented x3  Mood and affect normal   Eyes: PERRLA, EOMI  Resp: Normal effort  No audible wheezing or stridor  CV: Palpable pulse  No discernable arrhythmia  No LE edema  Lymph:  No palpable cervical, axillary, or inguinal lymphadenopathy  Skin: Warm  No palpable masses  No visible lesions  Neuro: Normal muscle tone  Normal and symmetric DTR's  Bilateral Shoulder Exam  Alignment / Posture:  Normal cervical alignment  Normal shoulder posture  Normal scapular position  Inspection:  No swelling  No edema  No deformity    Palpation:  Moderate tenderness at subacromial and intertubecular groove  ROM:  Normal neck ROM  Shoulder ° bilaterally  Shoulder ER 85° bilaterally  Shoulder IR To T12 bilaterally  Strength:  5/5 supraspinatus, infraspinatus, and subscapularis bilaterally with some discomfort especially in the right through all movements with resistance  Stability:  No objective shoulder instability  Tests: (+) Vang  (+) Painful arc  (+) Speed  (+) [place]  (+) Internal impingement test   On the right shoulder pain was all [facility]  In the left these tests are also positive the pain was more in the subacromial space/biceps tendon area  (-) Spurling  Neurovascular:  Sensation intact in Ax/R/M/U nerve distributions  Sensation intact in all digital nerve distributions  Fingers warm and perfused  Studies Reviewed  XR of right shoulder - Moderate narrowing of the right AC joint with spurring noted  No arthritic changes at the glenohumeral noted this time  No fractures or dislocations seen  MRI of right shoulder - Shows moderate glenohumeral chondromalacia with mild bony changes of osteoarthritis  There is severe acromioclavicular degenerative changes noted including edema across the joint space  There is partial-thickness insertional tearing of the subscap tendon  There is a supraspinatus and infraspinatus tendinopathy  Tenosynovitis of the long head of the biceps tendon noted with possible partial tear  Subacromial-subdeltoid bursitis noted without high-grade supraspinatus or infraspinatus tear      Large joint arthrocentesis  Date/Time: 7/24/2018 1:01 PM  Consent given by: patient  Site marked: site marked  Timeout: Immediately prior to procedure a time out was called to verify the correct patient, procedure, equipment, support staff and site/side marked as required   Supporting Documentation  Indications: pain   Procedure Details  Location: shoulder - R subacromial bursa  Needle size: 22 G  Ultrasound guidance: no  Approach: anterolateral  Medications administered: 4 mL bupivacaine 0 25 %; 1 mL methylPREDNISolone acetate 40 mg/mL    Patient tolerance: patient tolerated the procedure well with no immediate complications  Dressing:  Sterile dressing applied           Medical, Surgical, Family, and Social History  The patient's medical history, family history, and social history, were reviewed and updated as appropriate  Past Medical History:   Diagnosis Date    Closed fracture of one rib     Last Assessed:  10/20/13    Hyperlipidemia     Hypertension        Past Surgical History:   Procedure Laterality Date    NO PAST SURGERIES      TONSILLECTOMY         Family History   Problem Relation Age of Onset    Stroke Mother     Alcohol abuse Father        Social History     Occupational History    RETIRED      Social History Main Topics    Smoking status: Never Smoker    Smokeless tobacco: Never Used    Alcohol use No      Comment: As per Allscripts:  Social drinker    Drug use: No    Sexual activity: Not on file       No Known Allergies      Current Outpatient Prescriptions:     ascorbic acid (VITAMIN C) 500 mg tablet, Take 500 mg by mouth daily, Disp: , Rfl:     aspirin 81 MG tablet, Take 81 mg by mouth daily  , Disp: , Rfl:     Cholecalciferol (VITAMIN D3) 86815 units TABS, Take by mouth, Disp: , Rfl:     hydrochlorothiazide (HYDRODIURIL) 25 mg tablet, Take 0 5 tablets (12 5 mg total) by mouth daily, Disp: 45 tablet, Rfl: 1    meloxicam (MOBIC) 15 mg tablet, TAKE 1 TABLET BY MOUTH  DAILY, Disp: 90 tablet, Rfl: 0    RESTASIS 0 05 % ophthalmic emulsion, , Disp: , Rfl:     meclizine (ANTIVERT) 25 mg tablet, Take 1 tablet (25 mg total) by mouth every 8 (eight) hours as needed for dizziness for up to 30 days, Disp: 30 tablet, Rfl: 0    metoprolol tartrate (LOPRESSOR) 25 mg tablet, Take 1 tablet (25 mg total) by mouth 2 (two) times a day for 30 days, Disp: 180 tablet, Rfl: 1      Indira Mckeon MD    Scribe Attestation    I,: am acting as a scribe while in the presence of the attending physician :        I,:    personally performed the services described in this documentation    as scribed in my presence :

## 2018-09-12 ENCOUNTER — TELEPHONE (OUTPATIENT)
Dept: FAMILY MEDICINE CLINIC | Facility: CLINIC | Age: 73
End: 2018-09-12

## 2018-09-12 DIAGNOSIS — I10 ESSENTIAL HYPERTENSION: ICD-10-CM

## 2018-09-12 RX ORDER — METOPROLOL TARTRATE 50 MG/1
50 TABLET, FILM COATED ORAL 2 TIMES DAILY
Qty: 180 TABLET | Refills: 1 | Status: SHIPPED | OUTPATIENT
Start: 2018-09-12 | End: 2019-02-15 | Stop reason: SDUPTHER

## 2018-09-12 NOTE — TELEPHONE ENCOUNTER
Patient is almost out of his Blood Pressure Medication  "Metoprolol tartrate" He said that Dr Linda Sharp told him to Change it to two times a day 50mg  He needs a new script sent to [address] [place] [address] for the 50mg 2 times a day   If you have any questions you may reach him at [phone]

## 2018-09-12 NOTE — TELEPHONE ENCOUNTER
TH, In your note you stated that you want pt to take 1 tablet bid but the Rx that you sent to pts m/o are the wrong directions you have 1/2 in the a m and 1 pm

## 2018-09-12 NOTE — TELEPHONE ENCOUNTER
Reviewed medication directions  They are correct  This was sent to the mail-order pharmacy correctly  This was confirmed back from mail-order via epic

## 2018-09-12 NOTE — TELEPHONE ENCOUNTER
Patient's blood pressure was reviewed today  It did appear to be elevated, so I would recommend the 50 mg twice a day  Will send same to pharmacy  Would recommend office visit in the future to review blood pressure

## 2018-10-23 ENCOUNTER — OFFICE VISIT (OUTPATIENT)
Dept: OBGYN CLINIC | Facility: MEDICAL CENTER | Age: 73
End: 2018-10-23
Payer: MEDICARE

## 2018-10-23 ENCOUNTER — APPOINTMENT (OUTPATIENT)
Dept: LAB | Facility: MEDICAL CENTER | Age: 73
End: 2018-10-23
Payer: MEDICARE

## 2018-10-23 VITALS
HEART RATE: 65 BPM | SYSTOLIC BLOOD PRESSURE: 145 MMHG | HEIGHT: 68 IN | WEIGHT: 168 LBS | DIASTOLIC BLOOD PRESSURE: 80 MMHG | BODY MASS INDEX: 25.46 KG/M2

## 2018-10-23 DIAGNOSIS — M75.52 SUBACROMIAL BURSITIS OF LEFT SHOULDER JOINT: Primary | ICD-10-CM

## 2018-10-23 DIAGNOSIS — E04.2 MULTIPLE THYROID NODULES: ICD-10-CM

## 2018-10-23 LAB
T4 FREE SERPL-MCNC: 1.15 NG/DL (ref 0.76–1.46)
TSH SERPL DL<=0.05 MIU/L-ACNC: 1.36 UIU/ML (ref 0.36–3.74)

## 2018-10-23 PROCEDURE — 84439 ASSAY OF FREE THYROXINE: CPT

## 2018-10-23 PROCEDURE — 84443 ASSAY THYROID STIM HORMONE: CPT

## 2018-10-23 PROCEDURE — 36415 COLL VENOUS BLD VENIPUNCTURE: CPT

## 2018-10-23 PROCEDURE — 99213 OFFICE O/P EST LOW 20 MIN: CPT | Performed by: ORTHOPAEDIC SURGERY

## 2018-10-23 NOTE — PROGRESS NOTES
Orthopaedic Surgery - Office Note  Tatianna Davis (81 y o  male)   : 1945   MRN: [id number]  Encounter Date: 10/23/2018    Chief Complaint   Patient presents with    Right Shoulder - Follow-up       Assessment / Plan  Right shoulder AC joint arthritis, mild glenohumeral arthritis, and subacromial impingement  · Continue home therapy exercises as given  Return in about 2 months (around 2018)  History of Present Illness  Tatianna Davis is a 74 y o  male who presents for follow-up of right shoulder rotator cuff tendinitis  He received corticosteroid injection to the right subacromial bursa during his last visit 3 months ago which gave him moderate symptomatic improvement  He has been very active with his right shoulder, however he has not been strictly compliant with his home exercise therapy regimen  Pain is along the anterior lateral aspect of the shoulder and is made worse with activity and is relieved with rest   Pain is not associated with any numbness or tingling  Pain does not radiate  Review of Systems  Pertinent items are noted in HPI  All other systems were reviewed and are negative  Physical Exam  /80   Pulse 65   Ht 5' 8" (1 727 m)   Wt 76 2 kg (168 lb)   BMI 25 54 kg/m²   Cons: Appears well  No apparent distress  Psych: Alert  Oriented x3  Mood and affect normal   Eyes: PERRLA, EOMI  Resp: Normal effort  No audible wheezing or stridor  CV: Palpable pulse  No discernable arrhythmia  No LE edema  Lymph:  No palpable cervical, axillary, or inguinal lymphadenopathy  Skin: Warm  No palpable masses  No visible lesions  Neuro: Normal muscle tone  Normal and symmetric DTR's  Right Shoulder Exam  Alignment / Posture:  Normal shoulder posture  Inspection:  No swelling  Palpation:  No tenderness  ROM:  130° of forward elevation, 30° external rotation, internal rotation to L5  Strength:  5/5 supraspinatus, infraspinatus, and subscapularis    Stability: No objective shoulder instability  Tests:   Positive Vang, positive Neer's  Neurovascular:    Distally he is neurovascularly intact and extremities warm well perfused  Studies Reviewed  No new imaging to review  Old x-rays of the right shoulder show right AC joint arthritis  MRI of the right shoulder shows rotator cuff tendinitis biceps tenosynovitis    Procedures  No procedures today  Medical, Surgical, Family, and Social History  The patient's medical history, family history, and social history, were reviewed and updated as appropriate  Past Medical History:   Diagnosis Date    Closed fracture of one rib     Last Assessed:  10/20/13    Hyperlipidemia     Hypertension        Past Surgical History:   Procedure Laterality Date    NO PAST SURGERIES      TONSILLECTOMY         Family History   Problem Relation Age of Onset    Stroke Mother     Alcohol abuse Father        Social History     Occupational History    RETIRED      Social History Main Topics    Smoking status: Never Smoker    Smokeless tobacco: Never Used    Alcohol use No      Comment: As per Allscripts:  Social drinker    Drug use: No    Sexual activity: Not on file       No Known Allergies      Current Outpatient Prescriptions:     ascorbic acid (VITAMIN C) 500 mg tablet, Take 500 mg by mouth daily, Disp: , Rfl:     aspirin 81 MG tablet, Take 81 mg by mouth daily  , Disp: , Rfl:     Cholecalciferol (VITAMIN D3) 37804 units TABS, Take by mouth, Disp: , Rfl:     hydrochlorothiazide (HYDRODIURIL) 25 mg tablet, Take 0 5 tablets (12 5 mg total) by mouth daily, Disp: 45 tablet, Rfl: 1    meloxicam (MOBIC) 15 mg tablet, TAKE 1 TABLET BY MOUTH  DAILY, Disp: 90 tablet, Rfl: 0    metoprolol tartrate (LOPRESSOR) 50 mg tablet, Take 1 tablet (50 mg total) by mouth 2 (two) times a day for 90 days, Disp: 180 tablet, Rfl: 1    RESTASIS 0 05 % ophthalmic emulsion, , Disp: , Rfl:     meclizine (ANTIVERT) 25 mg tablet, Take 1 tablet (25 mg total) by mouth every 8 (eight) hours as needed for dizziness for up to 30 days, Disp: 30 tablet, Rfl: 0      Antonio Argueta    I,:    am acting as a scribe while in the presence of the attending physician :        I,:    personally performed the services described in this documentation    as scribed in my presence :

## 2018-10-24 ENCOUNTER — TELEPHONE (OUTPATIENT)
Dept: ENDOCRINOLOGY | Facility: CLINIC | Age: 73
End: 2018-10-24

## 2018-10-24 NOTE — TELEPHONE ENCOUNTER
----- Message from [place] sent at 10/24/2018 11:55 AM EDT -----  Please call the patient regarding his result   Normal thyroid function test

## 2018-10-25 ENCOUNTER — HOSPITAL ENCOUNTER (OUTPATIENT)
Dept: ULTRASOUND IMAGING | Facility: MEDICAL CENTER | Age: 73
Discharge: HOME | End: 2018-10-25
Payer: MEDICARE

## 2018-10-25 DIAGNOSIS — E04.2 MULTIPLE THYROID NODULES: ICD-10-CM

## 2018-10-25 PROCEDURE — 76536 US EXAM OF HEAD AND NECK: CPT

## 2018-10-30 ENCOUNTER — TELEPHONE (OUTPATIENT)
Dept: ENDOCRINOLOGY | Facility: CLINIC | Age: 73
End: 2018-10-30

## 2018-10-30 NOTE — TELEPHONE ENCOUNTER
----- Message from [email or web address] sent at 10/29/2018  5:15 PM EDT -----  Please call the patient regarding his result   Stable thyroid US

## 2018-11-20 DIAGNOSIS — E78.5 HYPERLIPIDEMIA: ICD-10-CM

## 2018-11-23 ENCOUNTER — APPOINTMENT (OUTPATIENT)
Dept: LAB | Facility: MEDICAL CENTER | Age: 73
End: 2018-11-23
Payer: MEDICARE

## 2018-11-23 DIAGNOSIS — E78.5 HYPERLIPIDEMIA: ICD-10-CM

## 2018-11-23 DIAGNOSIS — R97.20 ELEVATED PROSTATE SPECIFIC ANTIGEN (PSA): ICD-10-CM

## 2018-11-23 LAB
ALBUMIN SERPL BCP-MCNC: 3.8 G/DL (ref 3.5–5)
ALP SERPL-CCNC: 66 U/L (ref 46–116)
ALT SERPL W P-5'-P-CCNC: 26 U/L (ref 12–78)
ANION GAP SERPL CALCULATED.3IONS-SCNC: 3 MMOL/L (ref 4–13)
AST SERPL W P-5'-P-CCNC: 13 U/L (ref 5–45)
BILIRUB SERPL-MCNC: 0.43 MG/DL (ref 0.2–1)
BUN SERPL-MCNC: 25 MG/DL (ref 5–25)
CALCIUM SERPL-MCNC: 10 MG/DL (ref 8.3–10.1)
CHLORIDE SERPL-SCNC: 105 MMOL/L (ref 100–108)
CHOLEST SERPL-MCNC: 235 MG/DL (ref 50–200)
CO2 SERPL-SCNC: 28 MMOL/L (ref 21–32)
CREAT SERPL-MCNC: 1.16 MG/DL (ref 0.6–1.3)
GFR SERPL CREATININE-BSD FRML MDRD: 61 ML/MIN/1.73SQ M
GLUCOSE P FAST SERPL-MCNC: 101 MG/DL (ref 65–99)
HDLC SERPL-MCNC: 53 MG/DL (ref 40–60)
LDLC SERPL CALC-MCNC: 154 MG/DL (ref 0–100)
NONHDLC SERPL-MCNC: 182 MG/DL
POTASSIUM SERPL-SCNC: 4.2 MMOL/L (ref 3.5–5.3)
PROT SERPL-MCNC: 7 G/DL (ref 6.4–8.2)
PSA SERPL-MCNC: 1.8 NG/ML (ref 0–4)
SODIUM SERPL-SCNC: 136 MMOL/L (ref 136–145)
TRIGL SERPL-MCNC: 138 MG/DL

## 2018-11-23 PROCEDURE — 80053 COMPREHEN METABOLIC PANEL: CPT

## 2018-11-23 PROCEDURE — 36415 COLL VENOUS BLD VENIPUNCTURE: CPT

## 2018-11-23 PROCEDURE — 80061 LIPID PANEL: CPT

## 2018-11-23 PROCEDURE — 84153 ASSAY OF PSA TOTAL: CPT

## 2018-11-27 DIAGNOSIS — I10 ESSENTIAL HYPERTENSION: ICD-10-CM

## 2018-11-28 RX ORDER — HYDROCHLOROTHIAZIDE 25 MG/1
TABLET ORAL
Qty: 45 TABLET | Refills: 1 | Status: SHIPPED | OUTPATIENT
Start: 2018-11-28 | End: 2019-05-27 | Stop reason: SDUPTHER

## 2018-11-29 ENCOUNTER — TELEPHONE (OUTPATIENT)
Dept: FAMILY MEDICINE CLINIC | Facility: CLINIC | Age: 73
End: 2018-11-29

## 2018-11-29 DIAGNOSIS — M75.52 SUBACROMIAL BURSITIS OF LEFT SHOULDER JOINT: ICD-10-CM

## 2018-11-29 NOTE — TELEPHONE ENCOUNTER
TH, Received a fax from [address] [place] [address] Dr pires: pts refill for his Mobic 15 mg, I see pt has been seeing Ortho, Do you still want to refill this?

## 2018-11-30 RX ORDER — MELOXICAM 15 MG/1
15 TABLET ORAL DAILY
Qty: 90 TABLET | Refills: 0 | Status: SHIPPED | OUTPATIENT
Start: 2018-11-30 | End: 2018-12-03

## 2018-12-03 ENCOUNTER — OFFICE VISIT (OUTPATIENT)
Dept: FAMILY MEDICINE CLINIC | Facility: CLINIC | Age: 73
End: 2018-12-03
Payer: MEDICARE

## 2018-12-03 VITALS
DIASTOLIC BLOOD PRESSURE: 84 MMHG | BODY MASS INDEX: 25.76 KG/M2 | HEIGHT: 68 IN | SYSTOLIC BLOOD PRESSURE: 140 MMHG | HEART RATE: 76 BPM | WEIGHT: 170 LBS

## 2018-12-03 DIAGNOSIS — R73.9 HYPERGLYCEMIA: ICD-10-CM

## 2018-12-03 DIAGNOSIS — Z12.11 SCREEN FOR COLON CANCER: ICD-10-CM

## 2018-12-03 DIAGNOSIS — M19.011 OSTEOARTHRITIS OF RIGHT ACROMIOCLAVICULAR JOINT: Primary | ICD-10-CM

## 2018-12-03 DIAGNOSIS — M25.472 ANKLE SWELLING, LEFT: ICD-10-CM

## 2018-12-03 DIAGNOSIS — E78.2 MIXED HYPERLIPIDEMIA: ICD-10-CM

## 2018-12-03 DIAGNOSIS — R97.20 ELEVATED PROSTATE SPECIFIC ANTIGEN (PSA): ICD-10-CM

## 2018-12-03 DIAGNOSIS — I10 ESSENTIAL HYPERTENSION: ICD-10-CM

## 2018-12-03 PROCEDURE — 99214 OFFICE O/P EST MOD 30 MIN: CPT | Performed by: FAMILY MEDICINE

## 2018-12-03 PROCEDURE — G0439 PPPS, SUBSEQ VISIT: HCPCS | Performed by: FAMILY MEDICINE

## 2018-12-03 NOTE — ASSESSMENT & PLAN NOTE
Blood sugar was only minimally elevated  Would recommend limit carbohydrate intake, continue exercise that he is currently doing, follow-up in 1 year

## 2018-12-03 NOTE — PROGRESS NOTES
Assessment and Plan:  Annual Wellness Visit  mjs    Problem List Items Addressed This Visit     None        Health Maintenance Due   Topic Date Due    CRC Screening: Colonoscopy  1945         HPI:  Devante Ewing is a 74 y o  male here for his Subsequent Wellness Visit  Patient Active Problem List   Diagnosis    Hyperlipidemia    Hypertension    Dizziness and giddiness    Acute pain of both shoulders    Abnormal imaging of thyroid    Allergic contact dermatitis due to adhesives    Allergic rhinitis    Elevated prostate specific antigen (PSA)    Multiple thyroid nodules    Onychomycosis of toenail    Primary osteoarthritis of first carpometacarpal joint of left hand    Osteoarthritis of right acromioclavicular joint    Subacromial bursitis of left shoulder joint    Vertigo     Past Medical History:   Diagnosis Date    Closed fracture of one rib     Last Assessed:  10/20/13    Hyperlipidemia     Hypertension      Past Surgical History:   Procedure Laterality Date    NO PAST SURGERIES      TONSILLECTOMY       Family History   Problem Relation Age of Onset    Stroke Mother     Alcohol abuse Father      History   Smoking Status    Never Smoker   Smokeless Tobacco    Never Used     History   Alcohol Use No     Comment: As per Allscripts:  Social drinker      History   Drug Use No       Current Outpatient Prescriptions   Medication Sig Dispense Refill    ascorbic acid (VITAMIN C) 500 mg tablet Take 500 mg by mouth daily      aspirin 81 MG tablet Take 81 mg by mouth daily   Cholecalciferol (VITAMIN D3) 13147 units TABS Take by mouth      hydrochlorothiazide (HYDRODIURIL) 25 mg tablet TAKE ONE-HALF TABLET BY  MOUTH DAILY 45 tablet 1    metoprolol tartrate (LOPRESSOR) 50 mg tablet Take 1 tablet (50 mg total) by mouth 2 (two) times a day for 90 days 180 tablet 1    RESTASIS 0 05 % ophthalmic emulsion        No current facility-administered medications for this visit        No Known Allergies  Immunization History   Administered Date(s) Administered    Influenza Split High Dose Preservative Free IM 10/08/2014, 10/02/2015, 10/26/2016, 11/20/2017    Influenza TIV (IM) 09/27/2010, 12/07/2012    Pneumococcal Conjugate 13-Valent 03/12/2018    Pneumococcal Polysaccharide PPV23 12/01/2009    Zoster 12/01/2009    influenza, trivalent, adjuvanted 10/25/2018       Patient Care Team:  Vitor Mason MD as PCP - General  Vitor Mason MD    Medicare Screening Tests and Risk Assessments:  Batool Cormier is here for his Initial Wellness visit  Health Risk Assessment:  Patient rates overall health as very good  Patient feels that their physical health rating is Slightly better  Eyesight was rated as Same  Hearing was rated as Same  Patient feels that their emotional and mental health rating is Same  Pain experienced by patient in the last 7 days has been Some  Patient's pain rating has been 3/10  Patient states that he has experienced no weight loss or gain in last 6 months  Emotional/Mental Health:  Patient has been feeling nervous/anxious  PHQ-9 Depression Screening:    Frequency of the following problems over the past two weeks:      1  Little interest or pleasure in doing things: 0 - not at all      2  Feeling down, depressed, or hopeless: 0 - not at all  PHQ-2 Score: 0          Broken Bones/Falls: Fall Risk Assessment:    In the past year, patient has experienced: No history of falling in past year          Bladder/Bowel:  Patient has not leaked urine accidently in the last six months  Patient reports no loss of bowel control  Immunizations:  Patient has had a flu vaccination within the last year  Patient has received a pneumonia shot  Patient has received a shingles shot  Patient has not received tetanus/diphtheria shot  Home Safety:  Patient does not have trouble with stairs inside or outside of their home     Patient currently reports that there are no safety hazards present in home, working smoke alarms, working carbon monoxide detectors  Preventative Screenings:   prostate cancer screen performed, colon cancer screen completed, cholesterol screen completed, glaucoma eye exam completed,     Nutrition:  Current diet: Regular, Limited junk food and No Added Salt with servings of the following:    Medications:  Patient is currently taking over-the-counter supplements  List of OTC medications includes: vitamins  Patient is able to manage medications  Lifestyle Choices:  Patient reports no tobacco use  Patient has not smoked or used tobacco in the past   Patient reports no alcohol use  Patient drives a vehicle  Patient wears seat belt  Activities of Daily Living:  Can get out of bed by his or her self, able to dress self, able to make own meals, able to do own shopping, able to bathe self, can do own laundry/housekeeping, can manage own money, pay bills and track expenses    Previous Hospitalizations:  No hospitalization or ED visit in past 12 months        Advanced Directives:  Patient has decided on a power of   Patient has spoken to designated power of   Patient has not completed advanced directive  Additional Comments: 5 Wishes given to patient  Social Support: Wife    Preventative Screening/Counseling:      Cardiovascular:      General: Screening Not Indicated and Risks and Benefits Discussed          Diabetes:      General: Screening Current          Colorectal Cancer:      General: Screening Current          Prostate Cancer:      General: Screening Current and Risks and Benefits Discussed          Osteoporosis:      General: Risks and Benefits Discussed      Counseling: Calcium and Vitamin D Intake and Regular Weightbearing Exercise          AAA:      General: Screening Not Indicated          Glaucoma:      General: Risks and Benefits Discussed and Screening Current      Referrals: Ophthalmology          HIV:      General: Patient Declines          Hepatitis C:      General: Patient Declines        Advanced Directives:   Patient has no living will for healthcare, does not have durable POA for healthcare, Information on ACP and/or AD provided  5 wishes given

## 2018-12-03 NOTE — ASSESSMENT & PLAN NOTE
PSA is improved on testing 6 months after initial   Recommend recheck in 6 months, and SINDY at that time

## 2018-12-03 NOTE — ASSESSMENT & PLAN NOTE
Cholesterol is not at goal   Would recommend limit carbohydrate and cholesterol intake  Patient did not wish to take statins  Will re-evaluate in approximately 1 year

## 2018-12-03 NOTE — PATIENT INSTRUCTIONS
Problem List Items Addressed This Visit        Cardiovascular and Mediastinum    Hypertension     Blood pressure today is reasonable, no changes  Follow-up in the future  Relevant Orders    Comprehensive metabolic panel       Musculoskeletal and Integument    Osteoarthritis of right acromioclavicular joint - Primary     This continues to have some symptoms  Would recommend follow-up with Orthopedics  Other    Hyperlipidemia     Cholesterol is not at goal   Would recommend limit carbohydrate and cholesterol intake  Patient did not wish to take statins  Will re-evaluate in approximately 1 year  Relevant Orders    Comprehensive metabolic panel    Lipid panel    Elevated prostate specific antigen (PSA)     PSA is improved on testing 6 months after initial   Recommend recheck in 6 months, and SINDY at that time  Relevant Orders    PSA Total, Diagnostic    Hyperglycemia     Blood sugar was only minimally elevated  Would recommend limit carbohydrate intake, continue exercise that he is currently doing, follow-up in 1 year  Other Visit Diagnoses     Ankle swelling, left        Gone now  No signs of DVT or circulation issues  Follow in future        Screen for colon cancer        Relevant Orders    Ambulatory referral to Gastroenterology

## 2018-12-04 DIAGNOSIS — M75.52 SUBACROMIAL BURSITIS OF LEFT SHOULDER JOINT: ICD-10-CM

## 2018-12-04 DIAGNOSIS — M25.512 ACUTE PAIN OF BOTH SHOULDERS: Primary | ICD-10-CM

## 2018-12-04 DIAGNOSIS — M25.511 ACUTE PAIN OF BOTH SHOULDERS: Primary | ICD-10-CM

## 2018-12-04 DIAGNOSIS — M18.12 PRIMARY OSTEOARTHRITIS OF FIRST CARPOMETACARPAL JOINT OF LEFT HAND: ICD-10-CM

## 2018-12-04 RX ORDER — MELOXICAM 15 MG/1
15 TABLET ORAL DAILY
Qty: 90 TABLET | Refills: 3 | Status: SHIPPED | OUTPATIENT
Start: 2018-12-04 | End: 2019-12-17

## 2018-12-21 ENCOUNTER — OFFICE VISIT (OUTPATIENT)
Dept: OBGYN CLINIC | Facility: MEDICAL CENTER | Age: 73
End: 2018-12-21
Payer: MEDICARE

## 2018-12-21 VITALS
BODY MASS INDEX: 26.22 KG/M2 | DIASTOLIC BLOOD PRESSURE: 81 MMHG | WEIGHT: 173 LBS | HEIGHT: 68 IN | HEART RATE: 75 BPM | SYSTOLIC BLOOD PRESSURE: 154 MMHG

## 2018-12-21 DIAGNOSIS — M25.511 CHRONIC RIGHT SHOULDER PAIN: ICD-10-CM

## 2018-12-21 DIAGNOSIS — G89.29 CHRONIC RIGHT SHOULDER PAIN: ICD-10-CM

## 2018-12-21 DIAGNOSIS — M75.51 SUBACROMIAL BURSITIS OF RIGHT SHOULDER JOINT: Primary | ICD-10-CM

## 2018-12-21 DIAGNOSIS — M19.011 OSTEOARTHRITIS OF RIGHT ACROMIOCLAVICULAR JOINT: ICD-10-CM

## 2018-12-21 PROCEDURE — 99213 OFFICE O/P EST LOW 20 MIN: CPT | Performed by: ORTHOPAEDIC SURGERY

## 2018-12-21 PROCEDURE — 20610 DRAIN/INJ JOINT/BURSA W/O US: CPT | Performed by: ORTHOPAEDIC SURGERY

## 2018-12-21 RX ORDER — BUPIVACAINE HYDROCHLORIDE 2.5 MG/ML
1 INJECTION, SOLUTION INFILTRATION; PERINEURAL
Status: COMPLETED | OUTPATIENT
Start: 2018-12-21 | End: 2018-12-21

## 2018-12-21 RX ORDER — METHYLPREDNISOLONE ACETATE 40 MG/ML
1 INJECTION, SUSPENSION INTRA-ARTICULAR; INTRALESIONAL; INTRAMUSCULAR; SOFT TISSUE
Status: COMPLETED | OUTPATIENT
Start: 2018-12-21 | End: 2018-12-21

## 2018-12-21 RX ADMIN — METHYLPREDNISOLONE ACETATE 1 ML: 40 INJECTION, SUSPENSION INTRA-ARTICULAR; INTRALESIONAL; INTRAMUSCULAR; SOFT TISSUE at 09:26

## 2018-12-21 RX ADMIN — BUPIVACAINE HYDROCHLORIDE 1 ML: 2.5 INJECTION, SOLUTION INFILTRATION; PERINEURAL at 09:26

## 2018-12-21 NOTE — PROGRESS NOTES
Orthopaedic Surgery - Office Note  Esme Rogers (89 y o  male)   : 1945   MRN: [id number]  Encounter Date: 2018    Chief Complaint   Patient presents with    Right Shoulder - Follow-up       Assessment / Plan  RIGHT shoulder Severe AC joint Arthritis, Mild Glenohumeral arthritis and subacromial impingement syndrome  · CSI of right AC joint was performed  · Activity as tolerated  · Home exercise program reviewed  · Explained to the patient in detail that since he has been using Mobic chronically once daily to closely monitor any bowel symptoms/pain  If this happens he was instructed to contact PCP and/or go to the ER immediately  · He understood and all questions were answered  Return if symptoms worsen or fail to improve  History of Present Illness  Esme Rogers is a 74 y o  male who presents today for a follow up visit for his RIGHT shoulder  Patient states that he has had an increase in his symptoms since his last visit without any certain MALINI  Patient states that his symptoms are worse at night and while performing any overhead motions of the shoulder  Patient had appreciable relief of his symptoms from last cortisone injection that was performed on 18  He is requesting another CSI today in the office due to increased pain  Patient also states that he has been compliant with a home exercise program about 4 times per week with minimal benefit of his symptoms  Denies numbness and tingling    Review of Systems  Pertinent items are noted in HPI  All other systems were reviewed and are negative  Physical Exam  /81   Pulse 75   Ht 5' 8" (1 727 m)   Wt 78 5 kg (173 lb)   BMI 26 30 kg/m²   Cons: Appears well  No apparent distress  Psych: Alert  Oriented x3  Mood and affect normal   Eyes: PERRLA, EOMI  Resp: Normal effort  No audible wheezing or stridor  CV: Palpable pulse  No discernable arrhythmia  No LE edema    Lymph:  No palpable cervical, axillary, or inguinal lymphadenopathy  Skin: Warm  No palpable masses  No visible lesions  Neuro: Normal muscle tone  Normal and symmetric DTR's  Right Shoulder Exam  Alignment / Posture:  Normal shoulder posture  Normal scapular position  Inspection:  No swelling  No edema  No erythema  Palpation:  Mild tenderness at [facility] joint  ROM:  Shoulder   Shoulder ER 40  Shoulder IR L3  Strength:  5/5 supraspinatus, infraspinatus, and subscapularis  Stability:  No objective shoulder instability  Tests: (+) Vang  (+) Neer  (+) Painful arc  (-) Drop arm  Neurovascular:  Sensation intact in Ax/R/M/U nerve distributions  2+ radial pulse  Studies Reviewed  No studies to review    Large joint arthrocentesis  Date/Time: 12/21/2018 9:26 AM  Consent given by: patient  Site marked: site marked  Timeout: Immediately prior to procedure a time out was called to verify the correct patient, procedure, equipment, support staff and site/side marked as required   Supporting Documentation  Indications: pain and diagnostic evaluation   Procedure Details  Location: shoulder - Shoulder joint: R AC joint  Preparation: Patient was prepped and draped in the usual sterile fashion  Needle size: 22 G  Ultrasound guidance: no  Approach: superior  Medications administered: 1 mL bupivacaine 0 25 %; 1 mL methylPREDNISolone acetate 40 mg/mL    Patient tolerance: patient tolerated the procedure well with no immediate complications  Dressing:  Sterile dressing applied           Medical, Surgical, Family, and Social History  The patient's medical history, family history, and social history, were reviewed and updated as appropriate      Past Medical History:   Diagnosis Date    Closed fracture of one rib     Last Assessed:  10/20/13    Hyperlipidemia     Hypertension        Past Surgical History:   Procedure Laterality Date    TONSILLECTOMY         Family History   Problem Relation Age of Onset    Stroke Mother     Alcohol abuse Father    [facility] Depression Brother     Suicidality Brother        Social History     Occupational History    RETIRED      Social History Main Topics    Smoking status: Never Smoker    Smokeless tobacco: Never Used    Alcohol use No      Comment: As per Allscripts:  Social drinker    Drug use: No    Sexual activity: Not on file       No Known Allergies      Current Outpatient Prescriptions:     ascorbic acid (VITAMIN C) 500 mg tablet, Take 500 mg by mouth daily, Disp: , Rfl:     aspirin 81 MG tablet, Take 81 mg by mouth daily  , Disp: , Rfl:     Cholecalciferol (VITAMIN D3) 66670 units TABS, Take by mouth, Disp: , Rfl:     hydrochlorothiazide (HYDRODIURIL) 25 mg tablet, TAKE ONE-HALF TABLET BY  MOUTH DAILY, Disp: 45 tablet, Rfl: 1    meloxicam (MOBIC) 15 mg tablet, Take 1 tablet (15 mg total) by mouth daily, Disp: 90 tablet, Rfl: 3    metoprolol tartrate (LOPRESSOR) 50 mg tablet, Take 1 tablet (50 mg total) by mouth 2 (two) times a day for 90 days, Disp: 180 tablet, Rfl: 1    RESTASIS 0 05 % ophthalmic emulsion, , Disp: , Rfl:       Oleksandr Gabriel    Scribe Attestation    I,:   Daysi Aleman am acting as a scribe while in the presence of the attending physician :        I,:   Ketan Chin MD personally performed the services described in this documentation    as scribed in my presence :

## 2019-02-15 DIAGNOSIS — I10 ESSENTIAL HYPERTENSION: ICD-10-CM

## 2019-02-15 RX ORDER — METOPROLOL TARTRATE 50 MG/1
TABLET, FILM COATED ORAL
Qty: 180 TABLET | Refills: 1 | Status: SHIPPED | OUTPATIENT
Start: 2019-02-15 | End: 2019-05-27 | Stop reason: SDUPTHER

## 2019-05-27 DIAGNOSIS — I10 ESSENTIAL HYPERTENSION: ICD-10-CM

## 2019-05-28 RX ORDER — METOPROLOL TARTRATE 50 MG/1
TABLET, FILM COATED ORAL
Qty: 180 TABLET | Refills: 1 | Status: SHIPPED | OUTPATIENT
Start: 2019-05-28 | End: 2020-02-27

## 2019-05-28 RX ORDER — HYDROCHLOROTHIAZIDE 25 MG/1
TABLET ORAL
Qty: 45 TABLET | Refills: 1 | Status: SHIPPED | OUTPATIENT
Start: 2019-05-28 | End: 2019-11-18 | Stop reason: SDUPTHER

## 2019-06-10 ENCOUNTER — LAB (OUTPATIENT)
Dept: LAB | Facility: MEDICAL CENTER | Age: 74
End: 2019-06-10
Payer: MEDICARE

## 2019-06-10 DIAGNOSIS — R97.20 ELEVATED PROSTATE SPECIFIC ANTIGEN (PSA): ICD-10-CM

## 2019-06-10 LAB — PSA SERPL-MCNC: 1.7 NG/ML (ref 0–4)

## 2019-06-10 PROCEDURE — 84153 ASSAY OF PSA TOTAL: CPT

## 2019-06-17 ENCOUNTER — OFFICE VISIT (OUTPATIENT)
Dept: FAMILY MEDICINE CLINIC | Facility: CLINIC | Age: 74
End: 2019-06-17
Payer: MEDICARE

## 2019-06-17 VITALS
HEART RATE: 80 BPM | HEIGHT: 68 IN | WEIGHT: 171 LBS | DIASTOLIC BLOOD PRESSURE: 82 MMHG | SYSTOLIC BLOOD PRESSURE: 130 MMHG | BODY MASS INDEX: 25.91 KG/M2

## 2019-06-17 DIAGNOSIS — R73.9 HYPERGLYCEMIA: ICD-10-CM

## 2019-06-17 DIAGNOSIS — E78.2 MIXED HYPERLIPIDEMIA: ICD-10-CM

## 2019-06-17 DIAGNOSIS — I10 ESSENTIAL HYPERTENSION: ICD-10-CM

## 2019-06-17 DIAGNOSIS — R60.0 EDEMA OF LEG: Primary | ICD-10-CM

## 2019-06-17 DIAGNOSIS — R97.20 ELEVATED PROSTATE SPECIFIC ANTIGEN (PSA): ICD-10-CM

## 2019-06-17 DIAGNOSIS — E04.2 MULTIPLE THYROID NODULES: ICD-10-CM

## 2019-06-17 PROBLEM — M79.89 SWELLING OF LOWER LEG: Status: ACTIVE | Noted: 2019-06-17

## 2019-06-17 PROCEDURE — 99214 OFFICE O/P EST MOD 30 MIN: CPT | Performed by: FAMILY MEDICINE

## 2019-06-27 ENCOUNTER — HOSPITAL ENCOUNTER (OUTPATIENT)
Dept: NON INVASIVE DIAGNOSTICS | Facility: CLINIC | Age: 74
Discharge: HOME | End: 2019-06-27
Payer: MEDICARE

## 2019-06-27 DIAGNOSIS — R60.0 EDEMA OF LEG: ICD-10-CM

## 2019-06-27 PROCEDURE — 93971 EXTREMITY STUDY: CPT | Performed by: SURGERY

## 2019-06-27 PROCEDURE — 93923 UPR/LXTR ART STDY 3+ LVLS: CPT

## 2019-06-27 PROCEDURE — 93971 EXTREMITY STUDY: CPT

## 2019-06-27 PROCEDURE — 93923 UPR/LXTR ART STDY 3+ LVLS: CPT | Performed by: SURGERY

## 2019-07-17 ENCOUNTER — OFFICE VISIT (OUTPATIENT)
Dept: ENDOCRINOLOGY | Facility: CLINIC | Age: 74
End: 2019-07-17
Payer: MEDICARE

## 2019-07-17 VITALS
HEART RATE: 76 BPM | WEIGHT: 174.2 LBS | BODY MASS INDEX: 26.4 KG/M2 | DIASTOLIC BLOOD PRESSURE: 74 MMHG | SYSTOLIC BLOOD PRESSURE: 138 MMHG | HEIGHT: 68 IN

## 2019-07-17 DIAGNOSIS — E04.2 MULTIPLE THYROID NODULES: Primary | ICD-10-CM

## 2019-07-17 PROCEDURE — 99214 OFFICE O/P EST MOD 30 MIN: CPT | Performed by: NURSE PRACTITIONER

## 2019-09-27 NOTE — PROGRESS NOTES
Daily Note     Today's date: 3/5/2018  Patient name: Modesto Howe  : 1945  MRN: [id number]  Referring provider: Anca Monique MD  Dx:   Encounter Diagnosis     ICD-10-CM    1  Acute pain of both shoulders M25 511     M25 512                   Subjective: pt reported he had to use his shoulders to pole while skiing this morning because of the wind  Denied inc pain with this activity  Objective: See treatment diary below      Assessment: Tolerated treatment well  Patient exhibited good technique with therapeutic exercises      Plan: Progress treatment as tolerated        Precautions: HTN    Daily Treatment Diary     Manual  2/26 3/2 3/5          GH mobs gr III inf, post  5' 5'          PROM  5' 5'                                                     Exercise Diary              Serratus punch  10 x20          Prone rows  10 x20          Prone ext  10 x20          Shoulder ER SL  10 x20          Shoulder flex wand aarom supine  20 x20          Table slides flex stand  10 x20          Shoulder ext TB   Rx20          Rows TB   Rx20          IR TB   R x20          ER TB   R NV                                                                                                                                                Modalities Patient:   Imani Mccloud            MRN: [id number]            FIN: [id number]              Age:   60 years     Sex:  FEMALE     :  [birth year]   Associated Diagnoses:   None   Author:   Brenton Monge     Pre-Procedure   Procedure Date:  2019 . Procedure Type:  Endoscopic retrograde cholangiopancreatogram with endoscopic cannulation of papilla with direct visualization of the common bile duct and/ or the pancreatic duct, sphincterotomy/ papillotomy, removal of intra-ductal calculus. Procedure provider:  Performed by Brenton Monge. Informed Consent:  After discussing the rationale, risks and benefits, and alternatives to this procedure, the patient provided signed consent for the procedure. Indications:  60year old with epigastric pain, elevated LFTs, gallstone pancreatitis , cholelithiasis and MRCP positive for retained stone in CBD. Here for ERCP  Risks of the ERCP procedure were discussed at length with patient and include, but are not limited to,  bleeding, perforation, pancreatitis, need for stenting and/or repeat procedures, possible inability to cannulate the bile duct,  as well as anesthesia associated complications. Patient understands and consents to procedure. All questions answered. .    Medications:  No qualifying data available   , Medications (8) Active  Scheduled: (4)  ceFAZolin  2,000 mg 15 mL, Slow IV Push, On Call - Send to OR  cefTRIAXone  1,000 mg 10 mL, Slow IV Push, Daily  Heparin 5,000 unit/1 mL inj  5,000 unit 1 mL, Subcutaneous, Q8H  metroNIDAZOLE-NaCl 0.9%  500 mg 100 mL, IVPB, Q8H  Continuous: (1)  Lactated Ringers 1,000 mL  1,000 mL, IV, 125 mL/hr  PRN: (3)  Acetaminophen 325 mg tab  325 mg 1 tab, Oral, Q4H  Morphine PF 2 mg/1 mL inj SDV  4 mg 2 mL, IV Push, Q4H  Ondansetron 4 mg/2 mL inj SDV  4 mg 2 mL, Slow IV Push, Q6H   . ASA Classification:  Class III. Monitoring:  See anesthesia record.       Procedure   See anesthesia record for sedation given during procedure. The patient was positioned starting in the prone position. Endoscope type used was duodenoscope. The endoscope was lubricated then introduced orally. The scope was advanced to the duodenum. Endoscopy Findings   Side-viewing therapeutic duodenoscope was used and advanced into the second portion of the duodenum. Limited endoscopic views of the esophagus, stomach and duodenum were normal. The ampulla was visualized and appeared normal.  Selective biliary cannulation was achieved using a sphincterotome loaded with a 0.035 inch guidewire and advanced into the intrahepatic ducts. Contrast cholangiogram showed a 9mm bile duct with a 6mm filling defect in  the distal duct consistent with stone. The intrahepatics were normal. A sphincterotomy was performed using standard technique. Following this, a 10mm balloon tip cathter was used to sweep the stone from the duct. Final balloon occlusion cholangiogram was normal. Good bile flow note     Post-Procedure   The patient tolerated the procedure well. Complications encountered during the procedure were none. Estimated blood loss:  None. Blood administered:  No.    Grafts/implants:  None. Specimens were sent to pathology. Assistants:  None. Surgical Tasks Performed by Assistant:  None. Impression and Plan   1. Retained bile duct stone removed successfully via ERCP with sphincterotomy and balloon stone extraction. 2. Cholelithiasis  PLAN:  - Clear liquid diet for 6 hours then low fat diet  - Surgical cholecystectomy as planned  - Call if any fevers/abdominal pain   Endoscopic Retrograde Cholangiopancreatography Procedure:       Diagnosis: -.

## 2019-10-23 ENCOUNTER — APPOINTMENT (OUTPATIENT)
Dept: LAB | Facility: MEDICAL CENTER | Age: 74
End: 2019-10-23
Payer: MEDICARE

## 2019-10-23 ENCOUNTER — HOSPITAL ENCOUNTER (OUTPATIENT)
Dept: ULTRASOUND IMAGING | Facility: MEDICAL CENTER | Age: 74
Discharge: HOME | End: 2019-10-23
Payer: MEDICARE

## 2019-10-23 DIAGNOSIS — E04.2 MULTIPLE THYROID NODULES: ICD-10-CM

## 2019-10-23 LAB
T4 FREE SERPL-MCNC: 0.96 NG/DL (ref 0.76–1.46)
TSH SERPL DL<=0.05 MIU/L-ACNC: 0.88 UIU/ML (ref 0.36–3.74)

## 2019-10-23 PROCEDURE — 84439 ASSAY OF FREE THYROXINE: CPT

## 2019-10-23 PROCEDURE — 84443 ASSAY THYROID STIM HORMONE: CPT

## 2019-10-23 PROCEDURE — 36415 COLL VENOUS BLD VENIPUNCTURE: CPT

## 2019-10-23 PROCEDURE — 76536 US EXAM OF HEAD AND NECK: CPT

## 2019-10-25 ENCOUNTER — TELEPHONE (OUTPATIENT)
Dept: ENDOCRINOLOGY | Facility: CLINIC | Age: 74
End: 2019-10-25

## 2019-10-25 NOTE — TELEPHONE ENCOUNTER
----- Message from [place] sent at 10/25/2019 11:26 AM EDT -----  Please call the patient regarding his abnormal result   Normal thyroid function test

## 2019-10-29 ENCOUNTER — TELEPHONE (OUTPATIENT)
Dept: ENDOCRINOLOGY | Facility: CLINIC | Age: 74
End: 2019-10-29

## 2019-10-29 NOTE — TELEPHONE ENCOUNTER
----- Message from [place] sent at 10/29/2019  9:58 AM EDT -----  Please call the patient regarding his abnormal result   Stable thyroid nodules

## 2019-11-18 DIAGNOSIS — I10 ESSENTIAL HYPERTENSION: ICD-10-CM

## 2019-11-19 RX ORDER — HYDROCHLOROTHIAZIDE 25 MG/1
TABLET ORAL
Qty: 45 TABLET | Refills: 1 | Status: SHIPPED | OUTPATIENT
Start: 2019-11-19 | End: 2020-02-24 | Stop reason: SDUPTHER

## 2019-12-07 ENCOUNTER — APPOINTMENT (OUTPATIENT)
Dept: LAB | Facility: MEDICAL CENTER | Age: 74
End: 2019-12-07
Payer: MEDICARE

## 2019-12-07 DIAGNOSIS — I10 ESSENTIAL HYPERTENSION: ICD-10-CM

## 2019-12-07 DIAGNOSIS — E78.2 MIXED HYPERLIPIDEMIA: ICD-10-CM

## 2019-12-07 LAB
ALBUMIN SERPL BCP-MCNC: 3.9 G/DL (ref 3.5–5)
ALP SERPL-CCNC: 72 U/L (ref 46–116)
ALT SERPL W P-5'-P-CCNC: 24 U/L (ref 12–78)
ANION GAP SERPL CALCULATED.3IONS-SCNC: 2 MMOL/L (ref 4–13)
AST SERPL W P-5'-P-CCNC: 12 U/L (ref 5–45)
BILIRUB SERPL-MCNC: 0.57 MG/DL (ref 0.2–1)
BUN SERPL-MCNC: 20 MG/DL (ref 5–25)
CALCIUM SERPL-MCNC: 9.6 MG/DL (ref 8.3–10.1)
CHLORIDE SERPL-SCNC: 106 MMOL/L (ref 100–108)
CHOLEST SERPL-MCNC: 214 MG/DL (ref 50–200)
CO2 SERPL-SCNC: 30 MMOL/L (ref 21–32)
CREAT SERPL-MCNC: 1.2 MG/DL (ref 0.6–1.3)
GFR SERPL CREATININE-BSD FRML MDRD: 58 ML/MIN/1.73SQ M
GLUCOSE P FAST SERPL-MCNC: 95 MG/DL (ref 65–99)
HDLC SERPL-MCNC: 51 MG/DL
LDLC SERPL CALC-MCNC: 135 MG/DL (ref 0–100)
NONHDLC SERPL-MCNC: 163 MG/DL
POTASSIUM SERPL-SCNC: 3.9 MMOL/L (ref 3.5–5.3)
PROT SERPL-MCNC: 7 G/DL (ref 6.4–8.2)
SODIUM SERPL-SCNC: 138 MMOL/L (ref 136–145)
TRIGL SERPL-MCNC: 140 MG/DL

## 2019-12-07 PROCEDURE — 80053 COMPREHEN METABOLIC PANEL: CPT

## 2019-12-07 PROCEDURE — 80061 LIPID PANEL: CPT

## 2019-12-07 PROCEDURE — 36415 COLL VENOUS BLD VENIPUNCTURE: CPT

## 2019-12-17 ENCOUNTER — OFFICE VISIT (OUTPATIENT)
Dept: FAMILY MEDICINE CLINIC | Facility: CLINIC | Age: 74
End: 2019-12-17
Payer: MEDICARE

## 2019-12-17 VITALS
DIASTOLIC BLOOD PRESSURE: 74 MMHG | WEIGHT: 167 LBS | SYSTOLIC BLOOD PRESSURE: 132 MMHG | HEART RATE: 68 BPM | HEIGHT: 68 IN | BODY MASS INDEX: 25.31 KG/M2

## 2019-12-17 DIAGNOSIS — I10 ESSENTIAL HYPERTENSION: Primary | ICD-10-CM

## 2019-12-17 DIAGNOSIS — R20.0 LEFT LEG NUMBNESS: ICD-10-CM

## 2019-12-17 DIAGNOSIS — E78.2 MIXED HYPERLIPIDEMIA: ICD-10-CM

## 2019-12-17 DIAGNOSIS — R60.0 EDEMA OF LEG: ICD-10-CM

## 2019-12-17 DIAGNOSIS — R73.9 HYPERGLYCEMIA: ICD-10-CM

## 2019-12-17 DIAGNOSIS — L72.3 SEBACEOUS CYST: ICD-10-CM

## 2019-12-17 PROCEDURE — G0439 PPPS, SUBSEQ VISIT: HCPCS | Performed by: FAMILY MEDICINE

## 2019-12-17 PROCEDURE — 99214 OFFICE O/P EST MOD 30 MIN: CPT | Performed by: FAMILY MEDICINE

## 2019-12-17 RX ORDER — FUROSEMIDE 20 MG/1
TABLET ORAL
Qty: 30 TABLET | Refills: 0 | Status: SHIPPED | OUTPATIENT
Start: 2019-12-17 | End: 2020-11-11

## 2019-12-17 RX ORDER — INGENOL MEBUTATE 150 UG/G
GEL TOPICAL
Refills: 1 | COMMUNITY
Start: 2019-10-09 | End: 2020-11-11

## 2019-12-17 NOTE — ASSESSMENT & PLAN NOTE
Patient does have some left leg numbness, exacerbated by pickle ball  At this point, it is likely to be disc related  He has had problems in the past with this  Recommend consider physical therapy, x-ray, MRI  After those are completed, we could consider having injections with pain management  At the moment, it is more of an annoyance so he will consider this for the future

## 2019-12-17 NOTE — ASSESSMENT & PLAN NOTE
Some edema of the lower extremities  VITOR and ultrasound done before which were negative  Okay for p r n  Lasix

## 2019-12-17 NOTE — PROGRESS NOTES
Assessment and Plan:     Problem List Items Addressed This Visit     Edema of leg     Some edema of the lower extremities  VITOR and ultrasound done before which were negative  Okay for p r n  Lasix  Relevant Medications    furosemide (LASIX) 20 mg tablet    Other Relevant Orders    Comprehensive metabolic panel    Hyperglycemia     Blood sugar has been elevated in the past, but today was 95  This is normal   Recheck in 6 months  Relevant Orders    Comprehensive metabolic panel    Hyperlipidemia     Patient's LDL cholesterol is 135  This slightly elevated  Not currently on statins  I did review with him about use including side effects  He would prefer at this point to hold off  I would concur  Check labs in 6 months  Follow up after that  Relevant Orders    Comprehensive metabolic panel    Cholesterol, total    HDL cholesterol    LDL cholesterol, direct    Hypertension - Primary     Stable at the moment  No changes  Relevant Medications    furosemide (LASIX) 20 mg tablet    Other Relevant Orders    Comprehensive metabolic panel    Left leg numbness     Patient does have some left leg numbness, exacerbated by pickle ball  At this point, it is likely to be disc related  He has had problems in the past with this  Recommend consider physical therapy, x-ray, MRI  After those are completed, we could consider having injections with pain management  At the moment, it is more of an annoyance so he will consider this for the future  Sebaceous cyst     Patient does have a sebaceous cyst that is draining some  It does appear to be cyst contents rather than infected  Follow up with office visit verses surgery for removal of the cyst if there is more problems, or if patient desires  This will likely be a fairly wide excision as the prior incision and drainage makes it more difficult to remove the cyst in toto           Relevant Medications    PICATO 0 015 % GEL Preventive health issues were discussed with patient, and age appropriate screening tests were ordered as noted in patient's After Visit Summary  Personalized health advice and appropriate referrals for health education or preventive services given if needed, as noted in patient's After Visit Summary       History of Present Illness:     Patient presents for Medicare Annual Wellness visit    Patient Care Team:  Gain Yang MD as PCP - Eric Gatica MD     Problem List:     Patient Active Problem List   Diagnosis    Hyperlipidemia    Hypertension    Dizziness and giddiness    Acute pain of both shoulders    Abnormal imaging of thyroid    Allergic contact dermatitis due to adhesives    Allergic rhinitis    Elevated prostate specific antigen (PSA)    Multiple thyroid nodules    Onychomycosis of toenail    Primary osteoarthritis of first carpometacarpal joint of left hand    Osteoarthritis of right acromioclavicular joint    Subacromial bursitis of left shoulder joint    Vertigo    Hyperglycemia    Edema of leg    Left leg numbness    Sebaceous cyst      Past Medical and Surgical History:     Past Medical History:   Diagnosis Date    Closed fracture of one rib     Last Assessed:  10/20/13    Hyperlipidemia     Hypertension      Past Surgical History:   Procedure Laterality Date    TONSILLECTOMY        Family History:     Family History   Problem Relation Age of Onset    Stroke Mother     Alcohol abuse Father     Depression Brother     Suicidality Brother       Social History:     Social History     Socioeconomic History    Marital status: /Civil Union     Spouse name: None    Number of children: None    Years of education: None    Highest education level: None   Occupational History    Occupation: RETIRED   Social Needs    Financial resource strain: None    Food insecurity:     Worry: None     Inability: None    Transportation needs:     Medical: None Non-medical: None   Tobacco Use    Smoking status: Never Smoker    Smokeless tobacco: Never Used   Substance and Sexual Activity    Alcohol use: No     Comment: As per [employer]:  Social drinker    Drug use: No    Sexual activity: None   Lifestyle    Physical activity:     Days per week: None     Minutes per session: None    Stress: None   Relationships    Social connections:     Talks on phone: None     Gets together: None     Attends Baptism service: None     Active member of club or organization: None     Attends meetings of clubs or organizations: None     Relationship status: None    Intimate partner violence:     Fear of current or ex partner: None     Emotionally abused: None     Physically abused: None     Forced sexual activity: None   Other Topics Concern    None   Social History Narrative    CONSUMES 2 CUPS OF COFFEE PER DAY    Activities:  Skiing, tennis       Medications and Allergies:     Current Outpatient Medications   Medication Sig Dispense Refill    ascorbic acid (VITAMIN C) 500 mg tablet Take 500 mg by mouth daily      aspirin 81 MG tablet Take 81 mg by mouth once a week       Cholecalciferol (VITAMIN D3) 61918 units TABS Take 2,000 Units by mouth       hydrochlorothiazide (HYDRODIURIL) 25 mg tablet TAKE ONE-HALF TABLET BY  MOUTH DAILY 45 tablet 1    ipratropium (ATROVENT) 0 03 % nasal spray 2 sprays into each nostril every 12 (twelve) hours 30 mL 6    metoprolol tartrate (LOPRESSOR) 50 mg tablet TAKE 1 TABLET BY MOUTH TWO  TIMES DAILY 180 tablet 1    PICATO 0 015 % GEL ADELA NIGHTLY FOR 3 DAYS TO AFFECTED AREA OF LOWER LIP  1    RESTASIS 0 05 % ophthalmic emulsion       furosemide (LASIX) 20 mg tablet Take 1 daily as directed 30 tablet 0     No current facility-administered medications for this visit        No Known Allergies   Immunizations:     Immunization History   Administered Date(s) Administered    INFLUENZA 10/25/2018    Influenza Split High Dose Preservative Free IM 10/08/2014, 10/02/2015, 10/26/2016, 11/20/2017, 11/07/2019    Influenza TIV (IM) 09/27/2010, 12/07/2012    Pneumococcal Conjugate 13-Valent 03/12/2018    Pneumococcal Polysaccharide PPV23 12/01/2009    Zoster 12/01/2009    influenza, trivalent, adjuvanted 10/25/2018      Health Maintenance:         Topic Date Due    CRC Screening: Colonoscopy  06/17/2020 (Originally 1945)     There are no preventive care reminders to display for this patient  Medicare Health Risk Assessment:     /74   Pulse 68   Ht 5' 8" (1 727 m)   Wt 75 8 kg (167 lb)   BMI 25 39 kg/m²      Dawit Monge is here for his Subsequent Wellness visit  Health Risk Assessment:   Patient rates overall health as excellent  Patient feels that their physical health rating is same  Eyesight was rated as same  Hearing was rated as same  Patient feels that their emotional and mental health rating is same  Pain experienced in the last 7 days has been some  Patient's pain rating has been 2/10  Patient states that he has experienced no weight loss or gain in last 6 months  Depression Screening:   PHQ-2 Score: 0      Fall Risk Screening: In the past year, patient has experienced: no history of falling in past year      Home Safety:  Patient does not have trouble with stairs inside or outside of their home  Patient has working smoke alarms and has working carbon monoxide detector  Nutrition:   Current diet is Regular  Medications:   Patient is not currently taking any over-the-counter supplements  Patient is able to manage medications  Activities of Daily Living (ADLs)/Instrumental Activities of Daily Living (IADLs):   Walk and transfer into and out of bed and chair?: Yes  Dress and groom yourself?: Yes    Bathe or shower yourself?: Yes    Feed yourself?  Yes  Do your laundry/housekeeping?: Yes  Manage your money, pay your bills and track your expenses?: Yes  Make your own meals?: Yes    Do your own shopping?: Yes    Previous Hospitalizations:   Any hospitalizations or ED visits within the last 12 months?: No      Advance Care Planning:   Living will: Yes    Durable POA for healthcare: Yes    Advanced directive: Yes    Advanced directive counseling given: Yes    Five wishes given: No    Patient declined ACP directive: No      Cognitive Screening:   Provider or family/friend/caregiver concerned regarding cognition?: No    PREVENTIVE SCREENINGS      Cardiovascular Screening:    General: Screening Not Indicated and History Lipid Disorder      Diabetes Screening:     General: Screening Current      Colorectal Cancer Screening:     General: Screening Current      Prostate Cancer Screening:    General: Screening Not Indicated      Osteoporosis Screening:    General: Screening Not Indicated      Abdominal Aortic Aneurysm (AAA) Screening:        General: Screening Not Indicated      Lung Cancer Screening:     General: Screening Not Indicated      Hepatitis C Screening:    General: Screening Not Indicated and Patient Declines    Other Counseling Topics:   Alcohol use counseling, car/seat belt/driving safety, skin self-exam, sunscreen and calcium and vitamin D intake and regular weightbearing exercise         Mahamed Macario MD

## 2019-12-17 NOTE — PROGRESS NOTES
Assessment and Plan:    Problem List Items Addressed This Visit     Edema of leg     Some edema of the lower extremities  VITOR and ultrasound done before which were negative  Okay for p r n  Lasix  Relevant Medications    furosemide (LASIX) 20 mg tablet    Other Relevant Orders    Comprehensive metabolic panel    Hyperglycemia     Blood sugar has been elevated in the past, but today was 95  This is normal   Recheck in 6 months  Relevant Orders    Comprehensive metabolic panel    Hyperlipidemia     Patient's LDL cholesterol is 135  This slightly elevated  Not currently on statins  I did review with him about use including side effects  He would prefer at this point to hold off  I would concur  Check labs in 6 months  Follow up after that  Relevant Orders    Comprehensive metabolic panel    Cholesterol, total    HDL cholesterol    LDL cholesterol, direct    Hypertension - Primary     Stable at the moment  No changes  Relevant Medications    furosemide (LASIX) 20 mg tablet    Other Relevant Orders    Comprehensive metabolic panel    Left leg numbness     Patient does have some left leg numbness, exacerbated by pickle ball  At this point, it is likely to be disc related  He has had problems in the past with this  Recommend consider physical therapy, x-ray, MRI  After those are completed, we could consider having injections with pain management  At the moment, it is more of an annoyance so he will consider this for the future  Sebaceous cyst     Patient does have a sebaceous cyst that is draining some  It does appear to be cyst contents rather than infected  Follow up with office visit verses surgery for removal of the cyst if there is more problems, or if patient desires  This will likely be a fairly wide excision as the prior incision and drainage makes it more difficult to remove the cyst in toto           Relevant Medications    PICATO 0 015 % GEL Diagnoses and all orders for this visit:    Essential hypertension  -     Comprehensive metabolic panel; Future    Mixed hyperlipidemia  -     Comprehensive metabolic panel; Future  -     Cholesterol, total; Future  -     HDL cholesterol; Future  -     LDL cholesterol, direct; Future    Hyperglycemia  -     Comprehensive metabolic panel; Future    Edema of leg  -     furosemide (LASIX) 20 mg tablet; Take 1 daily as directed  -     Comprehensive metabolic panel; Future    Left leg numbness    Sebaceous cyst    Other orders  -     PICATO 0 015 % GEL; ADELA NIGHTLY FOR 3 DAYS TO AFFECTED AREA OF LOWER LIP              Subjective:      Patient ID: Enzo Oliveira is a 66 y o  male  CC:    Chief Complaint   Patient presents with    Follow-up     Follow up to chronic conditions and review lab results  mjs       HPI:    Here for multiple issues  Hypertension: HCTZ, Lopressor  Back: He had a cyst  Had I&D before  Has been enlarging, and then popped recently  Has decreased in size now  He is playing pickleball recently  He noted that he has had some disc issues before, and that seems to make the back worse  He has some numbness and left swelling  He did mention that he does occasionally use a "water pill" and that decreases the swelling  The following portions of the patient's history were reviewed and updated as appropriate: allergies, current medications, past family history, past medical history, past social history, past surgical history and problem list       Review of Systems   Constitutional: Negative  HENT: Negative  Eyes: Negative  Respiratory: Negative  Cardiovascular: Negative  Gastrointestinal: Negative  Endocrine: Negative  Genitourinary: Negative  Musculoskeletal: Negative  Skin: Negative  Allergic/Immunologic: Negative  Neurological: Negative  Hematological: Negative  Psychiatric/Behavioral: Negative            Data to review:   Na 138, K 3 9, Ca 9 6  AST 12, ALT 24  Creat 1 2, GFR 58  Glucose 95  Cholesterol 214, , HDL 51  Objective:    Vitals:    12/17/19 0837   BP: 132/74   Pulse: 68   Weight: 75 8 kg (167 lb)   Height: 5' 8" (1 727 m)        Physical Exam   Constitutional: He appears well-developed and well-nourished  HENT:   Head: Normocephalic and atraumatic  Neck: Normal range of motion  Neck supple  Cardiovascular: Normal rate, regular rhythm and normal heart sounds  Exam reveals no gallop and no friction rub  No murmur heard  Pulses:       Carotid pulses are 2+ on the right side, and 2+ on the left side  Pulmonary/Chest: Effort normal and breath sounds normal  No respiratory distress  He has no wheezes  He has no rales  Nursing note and vitals reviewed

## 2019-12-17 NOTE — ASSESSMENT & PLAN NOTE
Patient does have a sebaceous cyst that is draining some  It does appear to be cyst contents rather than infected  Follow up with office visit verses surgery for removal of the cyst if there is more problems, or if patient desires  This will likely be a fairly wide excision as the prior incision and drainage makes it more difficult to remove the cyst in toto

## 2019-12-17 NOTE — PATIENT INSTRUCTIONS
Problem List Items Addressed This Visit     Edema of leg     Some edema of the lower extremities  VITOR and ultrasound done before which were negative  Okay for p r n  Lasix  Relevant Medications    furosemide (LASIX) 20 mg tablet    Other Relevant Orders    Comprehensive metabolic panel    Hyperglycemia     Blood sugar has been elevated in the past, but today was 95  This is normal   Recheck in 6 months  Relevant Orders    Comprehensive metabolic panel    Hyperlipidemia     Patient's LDL cholesterol is 135  This slightly elevated  Not currently on statins  I did review with him about use including side effects  He would prefer at this point to hold off  I would concur  Check labs in 6 months  Follow up after that  Relevant Orders    Comprehensive metabolic panel    Cholesterol, total    HDL cholesterol    LDL cholesterol, direct    Hypertension - Primary     Stable at the moment  No changes  Relevant Medications    furosemide (LASIX) 20 mg tablet    Other Relevant Orders    Comprehensive metabolic panel    Left leg numbness     Patient does have some left leg numbness, exacerbated by pickle ball  At this point, it is likely to be disc related  He has had problems in the past with this  Recommend consider physical therapy, x-ray, MRI  After those are completed, we could consider having injections with pain management  At the moment, it is more of an annoyance so he will consider this for the future  Sebaceous cyst     Patient does have a sebaceous cyst that is draining some  It does appear to be cyst contents rather than infected  Follow up with office visit verses surgery for removal of the cyst if there is more problems, or if patient desires  This will likely be a fairly wide excision as the prior incision and drainage makes it more difficult to remove the cyst in toto           Relevant Medications    PICATO 0 015 % GEL          Medicare Preventive Visit Patient Instructions  Thank you for completing your Welcome to Medicare Visit or Medicare Annual Wellness Visit today  Your next wellness visit will be due in one year (12/17/2020)  The screening/preventive services that you may require over the next 5-10 years are detailed below  Some tests may not apply to you based off risk factors and/or age  Screening tests ordered at today's visit but not completed yet may show as past due  Also, please note that scanned in results may not display below  Preventive Screenings:  Service Recommendations Previous Testing/Comments   Colorectal Cancer Screening  · Colonoscopy    · Fecal Occult Blood Test (FOBT)/Fecal Immunochemical Test (FIT)  · Fecal DNA/Cologuard Test  · Flexible Sigmoidoscopy Age: 54-65 years old   Colonoscopy: every 10 years (May be performed more frequently if at higher risk)  OR  FOBT/FIT: every 1 year  OR  Cologuard: every 3 years  OR  Sigmoidoscopy: every 5 years  Screening may be recommended earlier than age 48 if at higher risk for colorectal cancer  Also, an individualized decision between you and your healthcare provider will decide whether screening between the ages of 74-80 would be appropriate  Colonoscopy: Not on file  FOBT/FIT: Not on file  Cologuard: Not on file  Sigmoidoscopy: Not on file         Prostate Cancer Screening Individualized decision between patient and health care provider in men between ages of 53-78   Medicare will cover every 12 months beginning on the day after your 50th birthday PSA: 1 7 ng/mL          Hepatitis C Screening Once for adults born between 1945 and 1965  More frequently in patients at high risk for Hepatitis C Hep C Antibody: Not on file       Diabetes Screening 1-2 times per year if you're at risk for diabetes or have pre-diabetes Fasting glucose: 95 mg/dL   A1C: No results in last 5 years       Cholesterol Screening Once every 5 years if you don't have a lipid disorder   May order more often based on risk factors  Lipid panel: 12/07/2019          Other Preventive Screenings Covered by Medicare:  1  Abdominal Aortic Aneurysm (AAA) Screening: covered once if your at risk  You're considered to be at risk if you have a family history of AAA or a male between the age of 73-68 who smoking at least 100 cigarettes in your lifetime  2  Lung Cancer Screening: covers low dose CT scan once per year if you meet all of the following conditions: (1) Age 50-69; (2) No signs or symptoms of lung cancer; (3) Current smoker or have quit smoking within the last 15 years; (4) You have a tobacco smoking history of at least 30 pack years (packs per day x number of years you smoked); (5) You get a written order from a healthcare provider  3  Glaucoma Screening: covered annually if you're considered high risk: (1) You have diabetes OR (2) Family history of glaucoma OR (3)  aged 48 and older OR (3)  American aged 72 and older  3  Osteoporosis Screening: covered every 2 years if you meet one of the following conditions: (1) Have a vertebral abnormality; (2) On glucocorticoid therapy for more than 3 months; (3) Have primary hyperparathyroidism; (4) On osteoporosis medications and need to assess response to drug therapy  5  HIV Screening: covered annually if you're between the age of 12-76  Also covered annually if you are younger than 13 and older than 72 with risk factors for HIV infection  For pregnant patients, it is covered up to 3 times per pregnancy  Immunizations:  Immunization Recommendations   Influenza Vaccine Annual influenza vaccination during flu season is recommended for all persons aged >= 6 months who do not have contraindications   Pneumococcal Vaccine (Prevnar and Pneumovax)  * Prevnar = PCV13  * Pneumovax = PPSV23 Adults 25-60 years old: 1-3 doses may be recommended based on certain risk factors  Adults 72 years old: Prevnar (PCV13) vaccine recommended followed by Pneumovax (PPSV23) vaccine   If already received PPSV23 since turning 65, then PCV13 recommended at least one year after PPSV23 dose  Hepatitis B Vaccine 3 dose series if at intermediate or high risk (ex: diabetes, end stage renal disease, liver disease)   Tetanus (Td) Vaccine - COST NOT COVERED BY MEDICARE PART B Following completion of primary series, a booster dose should be given every 10 years to maintain immunity against tetanus  Td may also be given as tetanus wound prophylaxis  Tdap Vaccine - COST NOT COVERED BY MEDICARE PART B Recommended at least once for all adults  For pregnant patients, recommended with each pregnancy  Shingles Vaccine (Shingrix) - COST NOT COVERED BY MEDICARE PART B  2 shot series recommended in those aged 48 and above     Health Maintenance Due:      Topic Date Due    CRC Screening: Colonoscopy  06/17/2020 (Originally 1945)     Immunizations Due:  There are no preventive care reminders to display for this patient  Advance Directives   What are advance directives? Advance directives are legal documents that state your wishes and plans for medical care  These plans are made ahead of time in case you lose your ability to make decisions for yourself  Advance directives can apply to any medical decision, such as the treatments you want, and if you want to donate organs  What are the types of advance directives? There are many types of advance directives, and each state has rules about how to use them  You may choose a combination of any of the following:  · Living will: This is a written record of the treatment you want  You can also choose which treatments you do not want, which to limit, and which to stop at a certain time  This includes surgery, medicine, IV fluid, and tube feedings  · Durable power of  for healthcare [facility]): This is a written record that states who you want to make healthcare choices for you when you are unable to make them for yourself   This person, called a proxy, is usually a family member or a friend  You may choose more than 1 proxy  · Do not resuscitate (DNR) order:  A DNR order is used in case your heart stops beating or you stop breathing  It is a request not to have certain forms of treatment, such as CPR  A DNR order may be included in other types of advance directives  · Medical directive: This covers the care that you want if you are in a coma, near death, or unable to make decisions for yourself  You can list the treatments you want for each condition  Treatment may include pain medicine, surgery, blood transfusions, dialysis, IV or tube feedings, and a ventilator (breathing machine)  · Values history: This document has questions about your views, beliefs, and how you feel and think about life  This information can help others choose the care that you would choose  Why are advance directives important? An advance directive helps you control your care  Although spoken wishes may be used, it is better to have your wishes written down  Spoken wishes can be misunderstood, or not followed  Treatments may be given even if you do not want them  An advance directive may make it easier for your family to make difficult choices about your care  Weight Management   Why it is important to manage your weight:  Being overweight increases your risk of health conditions such as heart disease, high blood pressure, type 2 diabetes, and certain types of cancer  It can also increase your risk for osteoarthritis, sleep apnea, and other respiratory problems  Aim for a slow, steady weight loss  Even a small amount of weight loss can lower your risk of health problems  How to lose weight safely:  A safe and healthy way to lose weight is to eat fewer calories and get regular exercise  You can lose up about 1 pound a week by decreasing the number of calories you eat by 500 calories each day     Healthy meal plan for weight management:  A healthy meal plan includes a variety of foods, contains fewer calories, and helps you stay healthy  A healthy meal plan includes the following:  · Eat whole-grain foods more often  A healthy meal plan should contain fiber  Fiber is the part of grains, fruits, and vegetables that is not broken down by your body  Whole-grain foods are healthy and provide extra fiber in your diet  Some examples of whole-grain foods are whole-wheat breads and pastas, oatmeal, brown rice, and bulgur  · Eat a variety of vegetables every day  Include dark, leafy greens such as spinach, kale, briseida greens, and mustard greens  Eat yellow and orange vegetables such as carrots, sweet potatoes, and winter squash  · Eat a variety of fruits every day  Choose fresh or canned fruit (canned in its own juice or light syrup) instead of juice  Fruit juice has very little or no fiber  · Eat low-fat dairy foods  Drink fat-free (skim) milk or 1% milk  Eat fat-free yogurt and low-fat cottage cheese  Try low-fat cheeses such as mozzarella and other reduced-fat cheeses  · Choose meat and other protein foods that are low in fat  Choose beans or other legumes such as split peas or lentils  Choose fish, skinless poultry (chicken or turkey), or lean cuts of red meat (beef or pork)  Before you cook meat or poultry, cut off any visible fat  · Use less fat and oil  Try baking foods instead of frying them  Add less fat, such as margarine, sour cream, regular salad dressing and mayonnaise to foods  Eat fewer high-fat foods  Some examples of high-fat foods include french fries, doughnuts, ice cream, and cakes  · Eat fewer sweets  Limit foods and drinks that are high in sugar  This includes candy, cookies, regular soda, and sweetened drinks  Exercise:  Exercise at least 30 minutes per day on most days of the week  Some examples of exercise include walking, biking, dancing, and swimming   You can also fit in more physical activity by taking the stairs instead of the elevator or parking farther away from stores  Ask your healthcare provider about the best exercise plan for you  © Copyright [employer] 2018 Information is for End User's use only and may not be sold, redistributed or otherwise used for commercial purposes   All illustrations and images included in CareNotes® are the copyrighted property of A D A M , Inc  or [address] [place] [address] [employer]

## 2019-12-17 NOTE — ASSESSMENT & PLAN NOTE
Patient's LDL cholesterol is 135  This slightly elevated  Not currently on statins  I did review with him about use including side effects  He would prefer at this point to hold off  I would concur  Check labs in 6 months  Follow up after that

## 2020-02-20 ENCOUNTER — OFFICE VISIT (OUTPATIENT)
Dept: FAMILY MEDICINE CLINIC | Facility: CLINIC | Age: 75
End: 2020-02-20
Payer: MEDICARE

## 2020-02-20 VITALS
SYSTOLIC BLOOD PRESSURE: 160 MMHG | RESPIRATION RATE: 16 BRPM | WEIGHT: 166 LBS | BODY MASS INDEX: 25.16 KG/M2 | DIASTOLIC BLOOD PRESSURE: 74 MMHG | HEART RATE: 76 BPM | TEMPERATURE: 98.4 F | HEIGHT: 68 IN

## 2020-02-20 DIAGNOSIS — J18.9 COMMUNITY ACQUIRED PNEUMONIA OF LEFT UPPER LOBE OF LUNG: Primary | ICD-10-CM

## 2020-02-20 DIAGNOSIS — R20.0 LEFT LEG NUMBNESS: ICD-10-CM

## 2020-02-20 DIAGNOSIS — I10 ESSENTIAL HYPERTENSION: ICD-10-CM

## 2020-02-20 PROCEDURE — 1160F RVW MEDS BY RX/DR IN RCRD: CPT | Performed by: FAMILY MEDICINE

## 2020-02-20 PROCEDURE — 99213 OFFICE O/P EST LOW 20 MIN: CPT | Performed by: FAMILY MEDICINE

## 2020-02-20 PROCEDURE — 94640 AIRWAY INHALATION TREATMENT: CPT | Performed by: FAMILY MEDICINE

## 2020-02-20 PROCEDURE — 3008F BODY MASS INDEX DOCD: CPT | Performed by: FAMILY MEDICINE

## 2020-02-20 PROCEDURE — 1036F TOBACCO NON-USER: CPT | Performed by: FAMILY MEDICINE

## 2020-02-20 PROCEDURE — 3077F SYST BP >= 140 MM HG: CPT | Performed by: FAMILY MEDICINE

## 2020-02-20 PROCEDURE — 4040F PNEUMOC VAC/ADMIN/RCVD: CPT | Performed by: FAMILY MEDICINE

## 2020-02-20 PROCEDURE — 3078F DIAST BP <80 MM HG: CPT | Performed by: FAMILY MEDICINE

## 2020-02-20 RX ORDER — ALBUTEROL SULFATE 90 UG/1
2 AEROSOL, METERED RESPIRATORY (INHALATION) EVERY 4 HOURS PRN
Qty: 1 INHALER | Refills: 0 | Status: SHIPPED | OUTPATIENT
Start: 2020-02-20 | End: 2020-03-21

## 2020-02-20 RX ORDER — AZITHROMYCIN 250 MG/1
TABLET, FILM COATED ORAL
Qty: 6 TABLET | Refills: 0 | Status: SHIPPED | OUTPATIENT
Start: 2020-02-20 | End: 2020-02-25

## 2020-02-20 RX ORDER — ALBUTEROL SULFATE 2.5 MG/3ML
2.5 SOLUTION RESPIRATORY (INHALATION) ONCE
Status: COMPLETED | OUTPATIENT
Start: 2020-02-20 | End: 2020-02-20

## 2020-02-20 RX ADMIN — ALBUTEROL SULFATE 2.5 MG: 2.5 SOLUTION RESPIRATORY (INHALATION) at 12:25

## 2020-02-20 NOTE — PATIENT INSTRUCTIONS
Problem List Items Addressed This Visit     Hypertension     Blood pressure today is slightly elevated, but he did report that he was using decongestants  Will re-evaluate in the future  No other changes  Left leg numbness     No current issues with the leg  Has been doing very well, and has been exercising well, and no concerns so far  At this time, or in the forseeable future, no further medical treatment would be needed  He is able to do all ADL's, without restrictions  Other Visit Diagnoses     Community acquired pneumonia of left upper lobe of lung ([facility] [address] )    -  Primary    Recommend Zithromax, continue Robitussin plain over-the-counter  Albuterol 2 puffs every 4 hours while awake      Relevant Medications    albuterol inhalation solution 2 5 mg (Completed)    azithromycin (ZITHROMAX) 250 mg tablet    albuterol (Ventolin HFA) 90 mcg/act inhaler    Other Relevant Orders    Mini neb

## 2020-02-20 NOTE — PROGRESS NOTES
Assessment and Plan:    Problem List Items Addressed This Visit     Hypertension     Blood pressure today is slightly elevated, but he did report that he was using decongestants  Will re-evaluate in the future  No other changes  Left leg numbness     No current issues with the leg  Has been doing very well, and has been exercising well, and no concerns so far  At this time, or in the forseeable future, no further medical treatment would be needed  He is able to do all ADL's, without restrictions  Other Visit Diagnoses     Community acquired pneumonia of left upper lobe of lung ([facility] )    -  Primary    Recommend Zithromax, continue Robitussin plain over-the-counter  Albuterol 2 puffs every 4 hours while awake  Relevant Medications    albuterol inhalation solution 2 5 mg (Completed)    azithromycin (ZITHROMAX) 250 mg tablet    albuterol (Ventolin HFA) 90 mcg/act inhaler                 Diagnoses and all orders for this visit:    Community acquired pneumonia of left upper lobe of lung ([facility] )  Comments:  Recommend Zithromax, continue Robitussin plain over-the-counter  Albuterol 2 puffs every 4 hours while awake  Orders:  -     albuterol inhalation solution 2 5 mg  -     azithromycin (ZITHROMAX) 250 mg tablet; Take 2 tablets on day 1, then 1 tablet daily days 2 through 5  -     albuterol (Ventolin HFA) 90 mcg/act inhaler; Inhale 2 puffs every 4 (four) hours as needed for wheezing or shortness of breath    Essential hypertension    Left leg numbness    Other orders  -     Mini neb              Subjective:      Patient ID: Gene Arreaga is a 66 y o  male  CC:    Chief Complaint   Patient presents with    Cough     Productive cough x 7-8 days        HPI:    Patient has been having some increasing cough and chest congestion for the last several days to about a week now  He has felt poorly with this  Medications:  Patient did use Robitussin for this    He knows that this medication did have some instructions about high blood pressure for it, as blood pressure today is elevated somewhat  The following portions of the patient's history were reviewed and updated as appropriate: allergies, current medications and problem list       Review of Systems   Constitutional: Positive for fatigue  Negative for activity change, chills, diaphoresis and fever  HENT: Negative  Eyes: Negative  Respiratory: Positive for cough  Negative for shortness of breath and wheezing  Cardiovascular: Negative for chest pain  All other systems reviewed and are negative  Data to review:       Objective:    Vitals:    02/20/20 1145   BP: 160/74   Pulse: 76   Resp: 16   Temp: 98 4 °F (36 9 °C)   TempSrc: Tympanic   Weight: 75 3 kg (166 lb)   Height: 5' 8" (1 727 m)        Physical Exam   Constitutional: He appears well-developed and well-nourished  HENT:   Head: Normocephalic and atraumatic  Neck: Normal range of motion  Neck supple  Cardiovascular: Normal rate, regular rhythm and normal heart sounds  Exam reveals no gallop and no friction rub  No murmur heard  Pulses:       Carotid pulses are 2+ on the right side, and 2+ on the left side  Pulmonary/Chest: Effort normal  No respiratory distress  He has no wheezes  He has rales  Nursing note and vitals reviewed  BMI Counseling: Body mass index is 25 24 kg/m²  The BMI is above normal  Nutrition recommendations include decreasing portion sizes, encouraging healthy choices of fruits and vegetables, decreasing fast food intake, consuming healthier snacks, limiting drinks that contain sugar, moderation in carbohydrate intake, increasing intake of lean protein, reducing intake of saturated and trans fat and reducing intake of cholesterol  Exercise recommendations include exercising 3-5 times per week  No pharmacotherapy was ordered  Mini neb  Performed by: Yuliana Lane MD  Authorized by:  Yuliana Lane MD     Number of treatments:  1  Treatment 1:   Pre-Procedure     Symptoms:  Wheezing, difficulty breathing and cough    Medication Administered:  Albuterol 2 5 mg  Post-Procedure     Lung sounds:  Improvement in airflow

## 2020-02-20 NOTE — ASSESSMENT & PLAN NOTE
Blood pressure today is slightly elevated, but he did report that he was using decongestants  Will re-evaluate in the future  No other changes

## 2020-02-20 NOTE — ASSESSMENT & PLAN NOTE
No current issues with the leg  Has been doing very well, and has been exercising well, and no concerns so far  At this time, or in the forseeable future, no further medical treatment would be needed  He is able to do all ADL's, without restrictions

## 2020-02-24 ENCOUNTER — TELEPHONE (OUTPATIENT)
Dept: FAMILY MEDICINE CLINIC | Facility: CLINIC | Age: 75
End: 2020-02-24

## 2020-02-24 DIAGNOSIS — I10 ESSENTIAL HYPERTENSION: ICD-10-CM

## 2020-02-24 RX ORDER — HYDROCHLOROTHIAZIDE 25 MG/1
12.5 TABLET ORAL DAILY
Qty: 45 TABLET | Refills: 3 | Status: SHIPPED | OUTPATIENT
Start: 2020-02-24 | End: 2020-11-11 | Stop reason: SDUPTHER

## 2020-02-24 NOTE — TELEPHONE ENCOUNTER
Patient needs a refill of his Hydrochlorothiazide but it needs to be faxed to [facility] at [phone] that is his new mail order supplier

## 2020-02-27 DIAGNOSIS — I10 ESSENTIAL HYPERTENSION: ICD-10-CM

## 2020-02-27 RX ORDER — METOPROLOL TARTRATE 50 MG/1
TABLET, FILM COATED ORAL
Qty: 180 TABLET | Refills: 1 | Status: SHIPPED | OUTPATIENT
Start: 2020-02-27 | End: 2020-03-30 | Stop reason: SDUPTHER

## 2020-03-30 ENCOUNTER — TELEPHONE (OUTPATIENT)
Dept: FAMILY MEDICINE CLINIC | Facility: CLINIC | Age: 75
End: 2020-03-30

## 2020-03-30 DIAGNOSIS — I10 ESSENTIAL HYPERTENSION: ICD-10-CM

## 2020-03-30 RX ORDER — METOPROLOL TARTRATE 50 MG/1
50 TABLET, FILM COATED ORAL 2 TIMES DAILY
Qty: 180 TABLET | Refills: 1 | Status: SHIPPED | OUTPATIENT
Start: 2020-03-30 | End: 2020-10-30 | Stop reason: SDUPTHER

## 2020-03-30 NOTE — TELEPHONE ENCOUNTER
Patient needs a refill faxed to [address]  At [phone] for his Metoprolol tartrate 50mg

## 2020-10-30 DIAGNOSIS — I10 ESSENTIAL HYPERTENSION: ICD-10-CM

## 2020-10-30 RX ORDER — METOPROLOL TARTRATE 50 MG/1
50 TABLET, FILM COATED ORAL 2 TIMES DAILY
Qty: 180 TABLET | Refills: 1 | Status: SHIPPED | OUTPATIENT
Start: 2020-10-30 | End: 2021-01-25 | Stop reason: SDUPTHER

## 2020-10-31 ENCOUNTER — HOSPITAL ENCOUNTER (EMERGENCY)
Facility: HOSPITAL | Age: 75
Discharge: HOME | End: 2020-10-31
Attending: EMERGENCY MEDICINE
Payer: MEDICARE

## 2020-10-31 VITALS
DIASTOLIC BLOOD PRESSURE: 84 MMHG | TEMPERATURE: 97.8 F | RESPIRATION RATE: 20 BRPM | OXYGEN SATURATION: 98 % | SYSTOLIC BLOOD PRESSURE: 182 MMHG | HEART RATE: 78 BPM

## 2020-10-31 DIAGNOSIS — Z77.098 CHEMICAL EXPOSURE OF EYE: Primary | ICD-10-CM

## 2020-10-31 PROCEDURE — 99283 EMERGENCY DEPT VISIT LOW MDM: CPT

## 2020-10-31 PROCEDURE — 99284 EMERGENCY DEPT VISIT MOD MDM: CPT | Performed by: EMERGENCY MEDICINE

## 2020-10-31 RX ORDER — OFLOXACIN 3 MG/ML
1 SOLUTION/ DROPS OPHTHALMIC 4 TIMES DAILY
Status: DISCONTINUED | OUTPATIENT
Start: 2020-10-31 | End: 2020-10-31 | Stop reason: HOSPADM

## 2020-10-31 RX ORDER — TETRACAINE HYDROCHLORIDE 5 MG/ML
2 SOLUTION OPHTHALMIC ONCE
Status: COMPLETED | OUTPATIENT
Start: 2020-10-31 | End: 2020-10-31

## 2020-10-31 RX ADMIN — FLUORESCEIN SODIUM 1 STRIP: 1 STRIP OPHTHALMIC at 01:04

## 2020-10-31 RX ADMIN — OFLOXACIN 1 DROP: 3 SOLUTION/ DROPS OPHTHALMIC at 01:47

## 2020-10-31 RX ADMIN — TETRACAINE HYDROCHLORIDE 2 DROP: 5 SOLUTION OPHTHALMIC at 01:04

## 2020-11-03 ENCOUNTER — LAB (OUTPATIENT)
Dept: LAB | Facility: CLINIC | Age: 75
End: 2020-11-03
Payer: MEDICARE

## 2020-11-03 DIAGNOSIS — E78.2 MIXED HYPERLIPIDEMIA: ICD-10-CM

## 2020-11-03 DIAGNOSIS — R60.0 EDEMA OF LEG: ICD-10-CM

## 2020-11-03 DIAGNOSIS — R97.20 ELEVATED PROSTATE SPECIFIC ANTIGEN (PSA): ICD-10-CM

## 2020-11-03 DIAGNOSIS — I10 ESSENTIAL HYPERTENSION: ICD-10-CM

## 2020-11-03 DIAGNOSIS — R73.9 HYPERGLYCEMIA: ICD-10-CM

## 2020-11-03 LAB
ALBUMIN SERPL BCP-MCNC: 3.9 G/DL (ref 3.5–5)
ALP SERPL-CCNC: 59 U/L (ref 46–116)
ALT SERPL W P-5'-P-CCNC: 28 U/L (ref 12–78)
ANION GAP SERPL CALCULATED.3IONS-SCNC: 4 MMOL/L (ref 4–13)
AST SERPL W P-5'-P-CCNC: 14 U/L (ref 5–45)
BILIRUB SERPL-MCNC: 0.67 MG/DL (ref 0.2–1)
BUN SERPL-MCNC: 23 MG/DL (ref 5–25)
CALCIUM SERPL-MCNC: 9.4 MG/DL (ref 8.3–10.1)
CHLORIDE SERPL-SCNC: 106 MMOL/L (ref 100–108)
CHOLEST SERPL-MCNC: 224 MG/DL (ref 50–200)
CO2 SERPL-SCNC: 29 MMOL/L (ref 21–32)
CREAT SERPL-MCNC: 1.22 MG/DL (ref 0.6–1.3)
GFR SERPL CREATININE-BSD FRML MDRD: 57 ML/MIN/1.73SQ M
GLUCOSE P FAST SERPL-MCNC: 108 MG/DL (ref 65–99)
HDLC SERPL-MCNC: 60 MG/DL
LDLC SERPL DIRECT ASSAY-MCNC: 146 MG/DL (ref 0–100)
POTASSIUM SERPL-SCNC: 3.9 MMOL/L (ref 3.5–5.3)
PROT SERPL-MCNC: 7 G/DL (ref 6.4–8.2)
PSA SERPL-MCNC: 2 NG/ML (ref 0–4)
SODIUM SERPL-SCNC: 139 MMOL/L (ref 136–145)

## 2020-11-03 PROCEDURE — 83721 ASSAY OF BLOOD LIPOPROTEIN: CPT

## 2020-11-03 PROCEDURE — 36415 COLL VENOUS BLD VENIPUNCTURE: CPT

## 2020-11-03 PROCEDURE — 83718 ASSAY OF LIPOPROTEIN: CPT

## 2020-11-03 PROCEDURE — 84153 ASSAY OF PSA TOTAL: CPT

## 2020-11-03 PROCEDURE — 80053 COMPREHEN METABOLIC PANEL: CPT

## 2020-11-03 PROCEDURE — 82465 ASSAY BLD/SERUM CHOLESTEROL: CPT

## 2020-11-11 ENCOUNTER — OFFICE VISIT (OUTPATIENT)
Dept: FAMILY MEDICINE CLINIC | Facility: CLINIC | Age: 75
End: 2020-11-11
Payer: MEDICARE

## 2020-11-11 VITALS
HEART RATE: 60 BPM | BODY MASS INDEX: 24.1 KG/M2 | TEMPERATURE: 97.6 F | DIASTOLIC BLOOD PRESSURE: 68 MMHG | WEIGHT: 159 LBS | SYSTOLIC BLOOD PRESSURE: 124 MMHG | HEIGHT: 68 IN

## 2020-11-11 DIAGNOSIS — R97.20 ELEVATED PROSTATE SPECIFIC ANTIGEN (PSA): ICD-10-CM

## 2020-11-11 DIAGNOSIS — E78.2 MIXED HYPERLIPIDEMIA: ICD-10-CM

## 2020-11-11 DIAGNOSIS — H61.23 BILATERAL IMPACTED CERUMEN: ICD-10-CM

## 2020-11-11 DIAGNOSIS — I10 ESSENTIAL HYPERTENSION: Primary | ICD-10-CM

## 2020-11-11 PROCEDURE — 99214 OFFICE O/P EST MOD 30 MIN: CPT | Performed by: FAMILY MEDICINE

## 2020-11-11 RX ORDER — HYDROCHLOROTHIAZIDE 25 MG/1
12.5 TABLET ORAL DAILY
Qty: 45 TABLET | Refills: 3 | Status: SHIPPED | OUTPATIENT
Start: 2020-11-11 | End: 2021-01-25 | Stop reason: SDUPTHER

## 2021-01-25 ENCOUNTER — TELEPHONE (OUTPATIENT)
Dept: FAMILY MEDICINE CLINIC | Facility: CLINIC | Age: 76
End: 2021-01-25

## 2021-01-25 DIAGNOSIS — I10 ESSENTIAL HYPERTENSION: ICD-10-CM

## 2021-01-25 RX ORDER — METOPROLOL TARTRATE 50 MG/1
50 TABLET, FILM COATED ORAL 2 TIMES DAILY
Qty: 180 TABLET | Refills: 3 | Status: SHIPPED | OUTPATIENT
Start: 2021-01-25 | End: 2021-11-02 | Stop reason: SDUPTHER

## 2021-01-25 RX ORDER — HYDROCHLOROTHIAZIDE 25 MG/1
12.5 TABLET ORAL DAILY
Qty: 45 TABLET | Refills: 3 | Status: SHIPPED | OUTPATIENT
Start: 2021-01-25 | End: 2022-02-03

## 2021-01-25 NOTE — TELEPHONE ENCOUNTER
Pt called to request refills for his Hydrochlorothiazide & Metoprolol  He asks that they be sent to the [facility] on [facility]  Thank you!

## 2021-01-25 NOTE — TELEPHONE ENCOUNTER
Problem List Items Addressed This Visit     Hypertension    Relevant Medications    hydrochlorothiazide (HYDRODIURIL) 25 mg tablet    metoprolol tartrate (LOPRESSOR) 50 mg tablet

## 2021-01-26 NOTE — TELEPHONE ENCOUNTER
PLEASE PERMANENTLY CHANGE PREFERRED [facility] TO [address] [place] [address] FOR ALL FUTURE REFILLS  THANK YOU!

## 2021-06-01 ENCOUNTER — TELEPHONE (OUTPATIENT)
Dept: FAMILY MEDICINE CLINIC | Facility: CLINIC | Age: 76
End: 2021-06-01

## 2021-06-01 DIAGNOSIS — E78.2 MIXED HYPERLIPIDEMIA: ICD-10-CM

## 2021-06-01 DIAGNOSIS — I10 ESSENTIAL HYPERTENSION: ICD-10-CM

## 2021-06-01 DIAGNOSIS — R97.20 ELEVATED PROSTATE SPECIFIC ANTIGEN (PSA): ICD-10-CM

## 2021-06-01 DIAGNOSIS — E04.2 MULTIPLE THYROID NODULES: Primary | ICD-10-CM

## 2021-06-01 NOTE — TELEPHONE ENCOUNTER
PT CALLED IN TO REQUEST BW ORDERS SO HE CAN HAVE DONE BEFORE HIS NEXT APPOINTMENT ON 6/16/21, IF POSSIBLE PLEASE GIVE PT A CALL WHEN DONE SO HE KNOWS WHEN HE GET GO FOR BW, THANK YOU!

## 2021-06-10 ENCOUNTER — APPOINTMENT (OUTPATIENT)
Dept: LAB | Facility: MEDICAL CENTER | Age: 76
End: 2021-06-10
Payer: MEDICARE

## 2021-06-10 DIAGNOSIS — I10 ESSENTIAL HYPERTENSION: ICD-10-CM

## 2021-06-10 DIAGNOSIS — R97.20 ELEVATED PROSTATE SPECIFIC ANTIGEN (PSA): ICD-10-CM

## 2021-06-10 DIAGNOSIS — E04.2 MULTIPLE THYROID NODULES: ICD-10-CM

## 2021-06-10 DIAGNOSIS — E78.2 MIXED HYPERLIPIDEMIA: ICD-10-CM

## 2021-06-10 LAB
ALBUMIN SERPL BCP-MCNC: 3.8 G/DL (ref 3.5–5)
ALP SERPL-CCNC: 62 U/L (ref 46–116)
ALT SERPL W P-5'-P-CCNC: 18 U/L (ref 12–78)
ANION GAP SERPL CALCULATED.3IONS-SCNC: 4 MMOL/L (ref 4–13)
AST SERPL W P-5'-P-CCNC: 11 U/L (ref 5–45)
BASOPHILS # BLD AUTO: 0.07 THOUSANDS/ΜL (ref 0–0.1)
BASOPHILS NFR BLD AUTO: 1 % (ref 0–1)
BILIRUB SERPL-MCNC: 0.6 MG/DL (ref 0.2–1)
BUN SERPL-MCNC: 26 MG/DL (ref 5–25)
CALCIUM SERPL-MCNC: 9.7 MG/DL (ref 8.3–10.1)
CHLORIDE SERPL-SCNC: 111 MMOL/L (ref 100–108)
CHOLEST SERPL-MCNC: 192 MG/DL (ref 50–200)
CO2 SERPL-SCNC: 27 MMOL/L (ref 21–32)
CREAT SERPL-MCNC: 1.26 MG/DL (ref 0.6–1.3)
EOSINOPHIL # BLD AUTO: 0.26 THOUSAND/ΜL (ref 0–0.61)
EOSINOPHIL NFR BLD AUTO: 5 % (ref 0–6)
ERYTHROCYTE [DISTWIDTH] IN BLOOD BY AUTOMATED COUNT: 12.9 % (ref 11.6–15.1)
GFR SERPL CREATININE-BSD FRML MDRD: 54 ML/MIN/1.73SQ M
GLUCOSE P FAST SERPL-MCNC: 100 MG/DL (ref 65–99)
HCT VFR BLD AUTO: 44.5 % (ref 36.5–49.3)
HDLC SERPL-MCNC: 53 MG/DL
HGB BLD-MCNC: 14.3 G/DL (ref 12–17)
IMM GRANULOCYTES # BLD AUTO: 0.02 THOUSAND/UL (ref 0–0.2)
IMM GRANULOCYTES NFR BLD AUTO: 0 % (ref 0–2)
LDLC SERPL CALC-MCNC: 125 MG/DL (ref 0–100)
LYMPHOCYTES # BLD AUTO: 1.38 THOUSANDS/ΜL (ref 0.6–4.47)
LYMPHOCYTES NFR BLD AUTO: 25 % (ref 14–44)
MCH RBC QN AUTO: 29.9 PG (ref 26.8–34.3)
MCHC RBC AUTO-ENTMCNC: 32.1 G/DL (ref 31.4–37.4)
MCV RBC AUTO: 93 FL (ref 82–98)
MONOCYTES # BLD AUTO: 0.72 THOUSAND/ΜL (ref 0.17–1.22)
MONOCYTES NFR BLD AUTO: 13 % (ref 4–12)
NEUTROPHILS # BLD AUTO: 3.01 THOUSANDS/ΜL (ref 1.85–7.62)
NEUTS SEG NFR BLD AUTO: 56 % (ref 43–75)
NRBC BLD AUTO-RTO: 0 /100 WBCS
PLATELET # BLD AUTO: 247 THOUSANDS/UL (ref 149–390)
PMV BLD AUTO: 11.1 FL (ref 8.9–12.7)
POTASSIUM SERPL-SCNC: 4.2 MMOL/L (ref 3.5–5.3)
PROT SERPL-MCNC: 7 G/DL (ref 6.4–8.2)
PSA SERPL-MCNC: 3.4 NG/ML (ref 0–4)
RBC # BLD AUTO: 4.79 MILLION/UL (ref 3.88–5.62)
SODIUM SERPL-SCNC: 142 MMOL/L (ref 136–145)
TRIGL SERPL-MCNC: 68 MG/DL
TSH SERPL DL<=0.05 MIU/L-ACNC: 1.07 UIU/ML (ref 0.36–3.74)
WBC # BLD AUTO: 5.46 THOUSAND/UL (ref 4.31–10.16)

## 2021-06-10 PROCEDURE — 80061 LIPID PANEL: CPT

## 2021-06-10 PROCEDURE — 84443 ASSAY THYROID STIM HORMONE: CPT

## 2021-06-10 PROCEDURE — 36415 COLL VENOUS BLD VENIPUNCTURE: CPT

## 2021-06-10 PROCEDURE — 85025 COMPLETE CBC W/AUTO DIFF WBC: CPT

## 2021-06-10 PROCEDURE — 80053 COMPREHEN METABOLIC PANEL: CPT

## 2021-06-10 PROCEDURE — 84153 ASSAY OF PSA TOTAL: CPT

## 2021-06-16 ENCOUNTER — OFFICE VISIT (OUTPATIENT)
Dept: FAMILY MEDICINE CLINIC | Facility: CLINIC | Age: 76
End: 2021-06-16
Payer: MEDICARE

## 2021-06-16 VITALS
DIASTOLIC BLOOD PRESSURE: 72 MMHG | HEART RATE: 60 BPM | HEIGHT: 68 IN | WEIGHT: 162.4 LBS | BODY MASS INDEX: 24.61 KG/M2 | SYSTOLIC BLOOD PRESSURE: 132 MMHG

## 2021-06-16 DIAGNOSIS — I10 ESSENTIAL HYPERTENSION: Primary | ICD-10-CM

## 2021-06-16 DIAGNOSIS — R73.9 HYPERGLYCEMIA: ICD-10-CM

## 2021-06-16 DIAGNOSIS — E04.2 MULTIPLE THYROID NODULES: ICD-10-CM

## 2021-06-16 DIAGNOSIS — E78.2 MIXED HYPERLIPIDEMIA: ICD-10-CM

## 2021-06-16 DIAGNOSIS — R20.0 LEFT LEG NUMBNESS: ICD-10-CM

## 2021-06-16 DIAGNOSIS — R97.20 ELEVATED PROSTATE SPECIFIC ANTIGEN (PSA): ICD-10-CM

## 2021-06-16 DIAGNOSIS — Z00.00 MEDICARE ANNUAL WELLNESS VISIT, SUBSEQUENT: ICD-10-CM

## 2021-06-16 DIAGNOSIS — L72.3 SEBACEOUS CYST: ICD-10-CM

## 2021-06-16 PROBLEM — M25.512 ACUTE PAIN OF BOTH SHOULDERS: Status: RESOLVED | Noted: 2018-02-05 | Resolved: 2021-06-16

## 2021-06-16 PROBLEM — H02.883 MEIBOMIAN GLAND DYSFUNCTION OF RIGHT EYE: Status: ACTIVE | Noted: 2020-10-19

## 2021-06-16 PROBLEM — R93.89 ABNORMAL IMAGING OF THYROID: Status: RESOLVED | Noted: 2017-12-14 | Resolved: 2021-06-16

## 2021-06-16 PROBLEM — L23.1 ALLERGIC CONTACT DERMATITIS DUE TO ADHESIVES: Status: RESOLVED | Noted: 2017-12-14 | Resolved: 2021-06-16

## 2021-06-16 PROBLEM — H04.123 INSUFFICIENCY OF TEAR FILM OF BOTH EYES: Status: ACTIVE | Noted: 2020-09-18

## 2021-06-16 PROBLEM — H43.819 POSTERIOR VITREOUS DETACHMENT: Status: ACTIVE | Noted: 2020-09-18

## 2021-06-16 PROBLEM — M25.511 ACUTE PAIN OF BOTH SHOULDERS: Status: RESOLVED | Noted: 2018-02-05 | Resolved: 2021-06-16

## 2021-06-16 PROBLEM — R42 VERTIGO: Status: RESOLVED | Noted: 2018-06-20 | Resolved: 2021-06-16

## 2021-06-16 PROBLEM — M75.52 SUBACROMIAL BURSITIS OF LEFT SHOULDER JOINT: Status: RESOLVED | Noted: 2018-04-17 | Resolved: 2021-06-16

## 2021-06-16 PROBLEM — R60.0 EDEMA OF LEG: Status: RESOLVED | Noted: 2019-06-17 | Resolved: 2021-06-16

## 2021-06-16 PROCEDURE — G0439 PPPS, SUBSEQ VISIT: HCPCS | Performed by: FAMILY MEDICINE

## 2021-06-16 PROCEDURE — 99214 OFFICE O/P EST MOD 30 MIN: CPT | Performed by: FAMILY MEDICINE

## 2021-06-16 PROCEDURE — 1123F ACP DISCUSS/DSCN MKR DOCD: CPT | Performed by: FAMILY MEDICINE

## 2021-06-16 NOTE — ASSESSMENT & PLAN NOTE
Patient is not going to be going on statins at this point  Not currently on medications  Given the increased risk from side effects, will not start statins

## 2021-06-16 NOTE — ASSESSMENT & PLAN NOTE
Cyst is still present, i e  Was not removed, but it definitely is not draining and does not bother him  Will follow in the future if needed

## 2021-06-16 NOTE — PROGRESS NOTES
Assessment and Plan:     Problem List Items Addressed This Visit     None           Preventive health issues were discussed with patient, and age appropriate screening tests were ordered as noted in patient's After Visit Summary  Personalized health advice and appropriate referrals for health education or preventive services given if needed, as noted in patient's After Visit Summary       History of Present Illness:     Patient presents for Medicare Annual Wellness visit    Patient Care Team:  Lino Vazquez MD as PCP - Rod Ervin MD     Problem List:     Patient Active Problem List   Diagnosis    Hyperlipidemia    Hypertension    Dizziness and giddiness    Acute pain of both shoulders    Abnormal imaging of thyroid    Allergic contact dermatitis due to adhesives    Allergic rhinitis    Elevated prostate specific antigen (PSA)    Multiple thyroid nodules    Onychomycosis of toenail    Primary osteoarthritis of first carpometacarpal joint of left hand    Osteoarthritis of right acromioclavicular joint    Subacromial bursitis of left shoulder joint    Vertigo    Hyperglycemia    Edema of leg    Left leg numbness    Sebaceous cyst      Past Medical and Surgical History:     Past Medical History:   Diagnosis Date    Closed fracture of one rib     Last Assessed:  10/20/13    Hyperlipidemia     Hypertension      Past Surgical History:   Procedure Laterality Date    TONSILLECTOMY        Family History:     Family History   Problem Relation Age of Onset    Stroke Mother     Alcohol abuse Father     Depression Brother     Suicidality Brother       Social History:     Social History     Socioeconomic History    Marital status: /Civil Union     Spouse name: Not on file    Number of children: Not on file    Years of education: Not on file    Highest education level: Not on file   Occupational History    Occupation: RETIRED   Tobacco Use    Smoking status: Never Smoker    Smokeless tobacco: Never Used   Vaping Use    Vaping Use: Never used   Substance and Sexual Activity    Alcohol use: No     Comment: As per [employer]:  Social drinker    Drug use: No    Sexual activity: Not on file   Other Topics Concern    Not on file   Social History Narrative    CONSUMES [address]    Activities:  Skiing, tennis     Social Determinants of Health     Financial Resource Strain:     Difficulty of Paying Living Expenses:    Food Insecurity:     Worried About Running Out of Food in the Last Year:     [address] in the Last Year:    Transportation Needs:     Lack of Transportation (Medical):      Lack of Transportation (Non-Medical):    Physical Activity:     Days of Exercise per Week:     Minutes of Exercise per Session:    Stress:     Feeling of Stress :    Social Connections:     Frequency of Communication with Friends and Family:     Frequency of Social Gatherings with Friends and Family:     Attends [employer] Services:     Active Member of Clubs or Organizations:     Attends Club or Organization Meetings:     Marital Status:    Intimate Partner Violence:     Fear of Current or Ex-Partner:     Emotionally Abused:     Physically Abused:     Sexually Abused:       Medications and Allergies:     Current Outpatient Medications   Medication Sig Dispense Refill    ascorbic acid (VITAMIN C) 500 mg tablet Take 500 mg by mouth daily      aspirin 81 MG tablet Take 81 mg by mouth once a week       Cholecalciferol (VITAMIN D3) 91168 units TABS Take 2,000 Units by mouth       hydrochlorothiazide (HYDRODIURIL) 25 mg tablet Take 0 5 tablets (12 5 mg total) by mouth daily 45 tablet 3    metoprolol tartrate (LOPRESSOR) 50 mg tablet Take 1 tablet (50 mg total) by mouth 2 (two) times a day 180 tablet 3    RESTASIS 0 05 % ophthalmic emulsion       ipratropium (ATROVENT) 0 03 % nasal spray 2 sprays into each nostril every 12 (twelve) hours (Patient not taking: Reported on 6/16/2021) 30 mL 6     No current facility-administered medications for this visit  No Known Allergies   Immunizations:     Immunization History   Administered Date(s) Administered    INFLUENZA 10/25/2018, 11/07/2019, 10/15/2020    Influenza Split High Dose Preservative Free IM 10/08/2014, 10/02/2015, 10/26/2016, 11/20/2017, 11/07/2019    Influenza, seasonal, injectable 09/27/2010, 12/07/2012    Pneumococcal Conjugate 13-Valent 03/12/2018    Pneumococcal Polysaccharide PPV23 12/01/2009    SARS-CoV-2 / COVID-19 mRNA IM (Moderna) 01/21/2021, 02/18/2021    Zoster 12/01/2009    Zoster Vaccine Recombinant 12/15/2019, 09/25/2020    influenza, trivalent, adjuvanted 10/25/2018      Health Maintenance:         Topic Date Due    Hepatitis C Screening  Never done         Topic Date Due    DTaP,Tdap,and Td Vaccines (1 - Tdap) Never done      Medicare Health Risk Assessment:     There were no vitals taken for this visit  Paula Gongora is here for his Subsequent Wellness visit  Health Risk Assessment:   Patient rates overall health as excellent  Patient feels that their physical health rating is same  Patient is very satisfied with their life  Eyesight was rated as same  Hearing was rated as same  Patient feels that their emotional and mental health rating is same  Patients states they are never, rarely angry  Patient states they are never, rarely unusually tired/fatigued  Pain experienced in the last 7 days has been none  Patient states that he has experienced no weight loss or gain in last 6 months  Depression Screening:   PHQ-2 Score: 0      Fall Risk Screening: In the past year, patient has experienced: no history of falling in past year      Home Safety:  Patient does not have trouble with stairs inside or outside of their home  Patient has working smoke alarms and has working carbon monoxide detector  Home safety hazards include: none  Nutrition:   Current diet is Regular       Medications: Patient is currently taking over-the-counter supplements  OTC medications include: see medication list  Patient is able to manage medications  Activities of Daily Living (ADLs)/Instrumental Activities of Daily Living (IADLs):   Walk and transfer into and out of bed and chair?: Yes  Dress and groom yourself?: Yes    Bathe or shower yourself?: Yes    Feed yourself? Yes  Do your laundry/housekeeping?: Yes  Manage your money, pay your bills and track your expenses?: Yes  Make your own meals?: Yes    Do your own shopping?: Yes    Previous Hospitalizations:   Any hospitalizations or ED visits within the last 12 months?: No      Advance Care Planning:   Living will: Yes    Durable POA for healthcare: Yes    Advanced directive: Yes    Advanced directive counseling given: Yes    Five wishes given: No      Cognitive Screening:   Provider or family/friend/caregiver concerned regarding cognition?: No    PREVENTIVE SCREENINGS      Cardiovascular Screening:    General: Screening Not Indicated and History Lipid Disorder      Diabetes Screening:     General: Screening Current      Colorectal Cancer Screening:     General: Risks and Benefits Discussed      Prostate Cancer Screening:    General: Screening Not Indicated      Osteoporosis Screening:    General: Screening Not Indicated      Abdominal Aortic Aneurysm (AAA) Screening:        General: Screening Not Indicated      Lung Cancer Screening:     General: Screening Not Indicated      Hepatitis C Screening:    General: Screening Not Indicated    Screening, Brief Intervention, and Referral to Treatment (SBIRT)    Screening  Typical number of drinks in a day: 0  Typical number of drinks in a week: 0  Interpretation: Low risk drinking behavior      Single Item Drug Screening:  How often have you used an illegal drug (including marijuana) or a prescription medication for non-medical reasons in the past year? never    Single Item Drug Screen Score: 0  Interpretation: Negative screen for possible drug use disorder      Zo Galarza MD

## 2021-06-16 NOTE — ASSESSMENT & PLAN NOTE
Nodules noted before  Last ultrasound in 2019 showed no changes from before  Recommend repeat ultrasound

## 2021-06-16 NOTE — PATIENT INSTRUCTIONS
Medicare Preventive Visit Patient Instructions  Thank you for completing your Welcome to Medicare Visit or Medicare Annual Wellness Visit today  Your next wellness visit will be due in one year (6/17/2022)  The screening/preventive services that you may require over the next 5-10 years are detailed below  Some tests may not apply to you based off risk factors and/or age  Screening tests ordered at today's visit but not completed yet may show as past due  Also, please note that scanned in results may not display below  Preventive Screenings:  Service Recommendations Previous Testing/Comments   Colorectal Cancer Screening  · Colonoscopy    · Fecal Occult Blood Test (FOBT)/Fecal Immunochemical Test (FIT)  · Fecal DNA/Cologuard Test  · Flexible Sigmoidoscopy Age: 54-65 years old   Colonoscopy: every 10 years (May be performed more frequently if at higher risk)  OR  FOBT/FIT: every 1 year  OR  Cologuard: every 3 years  OR  Sigmoidoscopy: every 5 years  Screening may be recommended earlier than age 48 if at higher risk for colorectal cancer  Also, an individualized decision between you and your healthcare provider will decide whether screening between the ages of 74-80 would be appropriate   Colonoscopy: Not on file  FOBT/FIT: Not on file  Cologuard: Not on file  Sigmoidoscopy: Not on file    Risks and Benefits Discussed     Prostate Cancer Screening Individualized decision between patient and health care provider in men between ages of 53-78   Medicare will cover every 12 months beginning on the day after your 50th birthday PSA: 3 4 ng/mL     Screening Not Indicated     Hepatitis C Screening Once for adults born between 1945 and 1965  More frequently in patients at high risk for Hepatitis C Hep C Antibody: Not on file    Screening Not Indicated   Diabetes Screening 1-2 times per year if you're at risk for diabetes or have pre-diabetes Fasting glucose: 100 mg/dL   A1C: No results in last 5 years    Screening Current Cholesterol Screening Once every 5 years if you don't have a lipid disorder  May order more often based on risk factors  Lipid panel: 06/10/2021    Screening Not Indicated  History Lipid Disorder      Other Preventive Screenings Covered by Medicare:  1  Abdominal Aortic Aneurysm (AAA) Screening: covered once if your at risk  You're considered to be at risk if you have a family history of AAA or a male between the age of 73-68 who smoking at least 100 cigarettes in your lifetime  2  Lung Cancer Screening: covers low dose CT scan once per year if you meet all of the following conditions: (1) Age 50-69; (2) No signs or symptoms of lung cancer; (3) Current smoker or have quit smoking within the last 15 years; (4) You have a tobacco smoking history of at least 30 pack years (packs per day x number of years you smoked); (5) You get a written order from a healthcare provider  3  Glaucoma Screening: covered annually if you're considered high risk: (1) You have diabetes OR (2) Family history of glaucoma OR (3)  aged 48 and older OR (3)  American aged 72 and older  3  Osteoporosis Screening: covered every 2 years if you meet one of the following conditions: (1) Have a vertebral abnormality; (2) On glucocorticoid therapy for more than 3 months; (3) Have primary hyperparathyroidism; (4) On osteoporosis medications and need to assess response to drug therapy  5  HIV Screening: covered annually if you're between the age of 12-76  Also covered annually if you are younger than 13 and older than 72 with risk factors for HIV infection  For pregnant patients, it is covered up to 3 times per pregnancy      Immunizations:  Immunization Recommendations   Influenza Vaccine Annual influenza vaccination during flu season is recommended for all persons aged >= 6 months who do not have contraindications   Pneumococcal Vaccine (Prevnar and Pneumovax)  * Prevnar = PCV13  * Pneumovax = PPSV23 Adults 25-60 years old: 1-3 doses may be recommended based on certain risk factors  Adults 72 years old: Prevnar (PCV13) vaccine recommended followed by Pneumovax (PPSV23) vaccine  If already received PPSV23 since turning 65, then PCV13 recommended at least one year after PPSV23 dose  Hepatitis B Vaccine 3 dose series if at intermediate or high risk (ex: diabetes, end stage renal disease, liver disease)   Tetanus (Td) Vaccine - COST NOT COVERED BY MEDICARE PART B Following completion of primary series, a booster dose should be given every 10 years to maintain immunity against tetanus  Td may also be given as tetanus wound prophylaxis  Tdap Vaccine - COST NOT COVERED BY MEDICARE PART B Recommended at least once for all adults  For pregnant patients, recommended with each pregnancy  Shingles Vaccine (Shingrix) - COST NOT COVERED BY MEDICARE PART B  2 shot series recommended in those aged 48 and above     Health Maintenance Due:      Topic Date Due    Hepatitis C Screening  Never done     Immunizations Due:      Topic Date Due    DTaP,Tdap,and Td Vaccines (1 - Tdap) Never done     Advance Directives   What are advance directives? Advance directives are legal documents that state your wishes and plans for medical care  These plans are made ahead of time in case you lose your ability to make decisions for yourself  Advance directives can apply to any medical decision, such as the treatments you want, and if you want to donate organs  What are the types of advance directives? There are many types of advance directives, and each state has rules about how to use them  You may choose a combination of any of the following:  · Living will: This is a written record of the treatment you want  You can also choose which treatments you do not want, which to limit, and which to stop at a certain time  This includes surgery, medicine, IV fluid, and tube feedings  · Durable power of  for healthcare [facility]):   This is a written record that states who you want to make healthcare choices for you when you are unable to make them for yourself  This person, called a proxy, is usually a family member or a friend  You may choose more than 1 proxy  · Do not resuscitate (DNR) order:  A DNR order is used in case your heart stops beating or you stop breathing  It is a request not to have certain forms of treatment, such as CPR  A DNR order may be included in other types of advance directives  · Medical directive: This covers the care that you want if you are in a coma, near death, or unable to make decisions for yourself  You can list the treatments you want for each condition  Treatment may include pain medicine, surgery, blood transfusions, dialysis, IV or tube feedings, and a ventilator (breathing machine)  · Values history: This document has questions about your views, beliefs, and how you feel and think about life  This information can help others choose the care that you would choose  Why are advance directives important? An advance directive helps you control your care  Although spoken wishes may be used, it is better to have your wishes written down  Spoken wishes can be misunderstood, or not followed  Treatments may be given even if you do not want them  An advance directive may make it easier for your family to make difficult choices about your care  © Copyright [employer] 2018 Information is for End User's use only and may not be sold, redistributed or otherwise used for commercial purposes  All illustrations and images included in CareNotes® are the copyrighted property of A D A M , Inc  or [address] [place] [address] [facility], [place]    Problem List Items Addressed This Visit     Elevated prostate specific antigen (PSA)     PSA is rising  Would recommend repeat in 6 months  SINDY was normal            Relevant Orders    PSA Total, Diagnostic    Hyperglycemia     Minimal elevation  Would recommend follow in 1 year  Limit carbs           Hyperlipidemia Patient is not going to be going on statins at this point  Not currently on medications  Given the increased risk from side effects, will not start statins  Hypertension - Primary     Blood pressure is doing quite well currently on hydrochlorothiazide and metoprolol  No change  RESOLVED: Left leg numbness     Patient reports he is doing extremely well, with no numbness currently  Will resolve diagnosis  Multiple thyroid nodules     Nodules noted before  Last ultrasound in 2019 showed no changes from before  Recommend repeat ultrasound  Relevant Orders    US thyroid    Sebaceous cyst     Cyst is still present, i e  Was not removed, but it definitely is not draining and does not bother him  Will follow in the future if needed  Other Visit Diagnoses     Medicare annual wellness visit, subsequent              COVID 19 Instructions    Amanda Mcnally was advised to limit contact with others to essential tasks such as getting food, medications, and medical care  Proper handwashing reviewed, and Hand sanitzer when washing is not available  If the patient develops symptoms of COVID 19, the patient should call the office as soon as possible  For 9525-2774 Flu season, it is strongly recommended that Flu Vaccinations be obtained  Virtual Visits may be conducted in several ways: Alexis: You should get a text message when the provider is ready to see you  Click on the link in the text message, and the call should start  You will need to type in your name, and allow camera and microphone access  This is HIPPA compliant, and secure  Please try to download [employer] Duo  Once you do download this on your phone, you will be prompted to add your phone number to the account  After that, he should receive a text from [employer], and use that code to verify your phone number  After that, you should be able to use Google Duo to receive and make video calls      Please download Microsoft Teams to your phone or computer  You would get an email with the meeting after scheduling with the office  You will Join the meeting, and wait there till the provider joins as well  Instructions for downloading this are available from the office  This is HIPPA compliant, and secure  We are committed to getting you vaccinated as soon as possible and will be closely following CDC and [facility]  guidelines as they are released and revised  Please refer to our COVID-19 vaccine webpage for the most up to date information on the vaccine and our distribution efforts  [employer] tn    OUR NEW LOCATION:  Starting around 28June2021, our new location and phone are:    [address], [address] [place], [place], [address]  Fax: [phone]    Lab services and OB/GYN will be at this location as well

## 2021-06-16 NOTE — PROGRESS NOTES
Assessment and Plan:    Problem List Items Addressed This Visit     Elevated prostate specific antigen (PSA)     PSA is rising  Would recommend repeat in 6 months  SINDY was normal            Relevant Orders    PSA Total, Diagnostic    Hyperglycemia     Minimal elevation  Would recommend follow in 1 year  Limit carbs  Hyperlipidemia     Patient is not going to be going on statins at this point  Not currently on medications  Given the increased risk from side effects, will not start statins  Hypertension - Primary     Blood pressure is doing quite well currently on hydrochlorothiazide and metoprolol  No change  RESOLVED: Left leg numbness     Patient reports he is doing extremely well, with no numbness currently  Will resolve diagnosis  Multiple thyroid nodules     Nodules noted before  Last ultrasound in 2019 showed no changes from before  Recommend repeat ultrasound  Relevant Orders    US thyroid    Sebaceous cyst     Cyst is still present, i e  Was not removed, but it definitely is not draining and does not bother him  Will follow in the future if needed  Other Visit Diagnoses     Medicare annual wellness visit, subsequent                     Diagnoses and all orders for this visit:    Essential hypertension    Mixed hyperlipidemia    Hyperglycemia    Elevated prostate specific antigen (PSA)  -     PSA Total, Diagnostic; Future    Multiple thyroid nodules  -     US thyroid; Future    Left leg numbness    Sebaceous cyst    Medicare annual wellness visit, subsequent              Subjective:      Patient ID: Jodie Augustin is a 64 y o  male  CC:    Chief Complaint   Patient presents with    Follow-up    Hypertension    Results    Medicare Wellness Visit       HPI:    Patient here to follow-up on multiple issues  Hyperlipidemia:  Reviewed laboratory studies today  Patient is not currently on medications  Hyperglycemia noted    This was on the chart from before  Has not been any specific problem  GFR slightly lower than before  Again, he is not having any specific problems at the moment  PSA:  Currently it is 3 4, which is slightly higher than what it was last time  Compared to the year before that, it was 1 7  He has not seen Urology  The following portions of the patient's history were reviewed and updated as appropriate: allergies, current medications, past family history, past medical history, past social history, past surgical history and problem list       Review of Systems      Data to review:   Sodium 142, potassium 4 2, calcium 9 7  AST 11, ALT 18  Creatinine 1 26, GFR 54  Blood sugar 100  White count 5 46, hemoglobin 14 3, hematocrit 44 5, platelets 521  Total cholesterol 192, , HDL 53, triglycerides 68  TSH 1 07  PSA 3 4      Objective:    Vitals:    06/16/21 1239   BP: 132/72   BP Location: Left arm   Patient Position: Sitting   Pulse: 60   Weight: 73 7 kg (162 lb 6 4 oz)   Height: 5' 8" (1 727 m)        Physical Exam

## 2021-11-02 DIAGNOSIS — I10 ESSENTIAL HYPERTENSION: ICD-10-CM

## 2021-11-03 RX ORDER — METOPROLOL TARTRATE 50 MG/1
50 TABLET, FILM COATED ORAL 2 TIMES DAILY
Qty: 180 TABLET | Refills: 1 | Status: SHIPPED | OUTPATIENT
Start: 2021-11-03 | End: 2022-08-01

## 2021-11-16 ENCOUNTER — HOSPITAL ENCOUNTER (OUTPATIENT)
Dept: ULTRASOUND IMAGING | Facility: MEDICAL CENTER | Age: 76
Discharge: HOME | End: 2021-11-16
Payer: MEDICARE

## 2021-11-16 DIAGNOSIS — E04.2 MULTIPLE THYROID NODULES: ICD-10-CM

## 2021-11-16 PROCEDURE — 76536 US EXAM OF HEAD AND NECK: CPT

## 2021-11-17 ENCOUNTER — CLINICAL SUPPORT (OUTPATIENT)
Dept: FAMILY MEDICINE CLINIC | Facility: CLINIC | Age: 76
End: 2021-11-17
Payer: MEDICARE

## 2021-11-17 DIAGNOSIS — Z23 ENCOUNTER FOR IMMUNIZATION: Primary | ICD-10-CM

## 2021-11-17 PROCEDURE — G0008 ADMIN INFLUENZA VIRUS VAC: HCPCS

## 2021-11-17 PROCEDURE — 90662 IIV NO PRSV INCREASED AG IM: CPT

## 2021-12-14 ENCOUNTER — LAB (OUTPATIENT)
Dept: LAB | Facility: MEDICAL CENTER | Age: 76
End: 2021-12-14
Payer: MEDICARE

## 2021-12-14 DIAGNOSIS — R97.20 ELEVATED PROSTATE SPECIFIC ANTIGEN (PSA): ICD-10-CM

## 2021-12-14 LAB — PSA SERPL-MCNC: 2 NG/ML (ref 0–4)

## 2021-12-14 PROCEDURE — 84153 ASSAY OF PSA TOTAL: CPT

## 2022-02-03 DIAGNOSIS — I10 ESSENTIAL HYPERTENSION: ICD-10-CM

## 2022-02-03 RX ORDER — HYDROCHLOROTHIAZIDE 25 MG/1
TABLET ORAL
Qty: 45 TABLET | Refills: 0 | Status: SHIPPED | OUTPATIENT
Start: 2022-02-03 | End: 2022-05-04

## 2022-03-16 ENCOUNTER — OFFICE VISIT (OUTPATIENT)
Dept: FAMILY MEDICINE CLINIC | Facility: CLINIC | Age: 77
End: 2022-03-16
Payer: MEDICARE

## 2022-03-16 VITALS
HEART RATE: 64 BPM | SYSTOLIC BLOOD PRESSURE: 130 MMHG | RESPIRATION RATE: 14 BRPM | BODY MASS INDEX: 25.67 KG/M2 | TEMPERATURE: 98.3 F | DIASTOLIC BLOOD PRESSURE: 88 MMHG | HEIGHT: 68 IN | WEIGHT: 169.4 LBS

## 2022-03-16 DIAGNOSIS — R97.20 ELEVATED PROSTATE SPECIFIC ANTIGEN (PSA): ICD-10-CM

## 2022-03-16 DIAGNOSIS — E04.2 MULTIPLE THYROID NODULES: ICD-10-CM

## 2022-03-16 DIAGNOSIS — I10 PRIMARY HYPERTENSION: Primary | ICD-10-CM

## 2022-03-16 DIAGNOSIS — E78.2 MIXED HYPERLIPIDEMIA: ICD-10-CM

## 2022-03-16 DIAGNOSIS — I10 ESSENTIAL HYPERTENSION: ICD-10-CM

## 2022-03-16 DIAGNOSIS — R60.0 EDEMA OF LEFT FOOT: ICD-10-CM

## 2022-03-16 PROCEDURE — 99214 OFFICE O/P EST MOD 30 MIN: CPT | Performed by: FAMILY MEDICINE

## 2022-03-16 RX ORDER — HYDROCHLOROTHIAZIDE 25 MG/1
12.5 TABLET ORAL DAILY
Qty: 45 TABLET | Refills: 1 | Status: CANCELLED | OUTPATIENT
Start: 2022-03-16

## 2022-03-16 RX ORDER — FUROSEMIDE 20 MG/1
20 TABLET ORAL DAILY
Qty: 90 TABLET | Refills: 0 | Status: SHIPPED | OUTPATIENT
Start: 2022-03-16 | End: 2022-06-13

## 2022-03-16 NOTE — ASSESSMENT & PLAN NOTE
PSA elevation noted at last check  This most recent it was 2 0, which is now back to where was before  No change  Check 1 year

## 2022-03-16 NOTE — PATIENT INSTRUCTIONS
Problem List Items Addressed This Visit     Edema of left foot     Patient does have some swelling in the left foot, which correlates to the numbness that he has had from disc problems previously  Lasix seems to help the most   He also is using ibuprofen  Can continue  Will send a small prescription of Lasix to the pharmacy  Reviewed with him about risks of kidney issues with diuretics  Relevant Medications    furosemide (LASIX) 20 mg tablet    Elevated prostate specific antigen (PSA)     PSA elevation noted at last check  This most recent it was 2 0, which is now back to where was before  No change  Check 1 year  Relevant Orders    PSA Total, Diagnostic    Hyperlipidemia    Relevant Orders    Comprehensive metabolic panel    Lipid panel    Hypertension - Primary     Blood pressure today was quite good  Continue hydrochlorothiazide and metoprolol  Relevant Medications    furosemide (LASIX) 20 mg tablet    Other Relevant Orders    Comprehensive metabolic panel    Multiple thyroid nodules     Patient does have multiple thyroid nodules from before  No specific changes at this point  It was recommended that he repeat in 2 years, which will be November of 2023  Other Visit Diagnoses     Essential hypertension        Relevant Medications    furosemide (LASIX) 20 mg tablet          COVID 19 Instructions    Jodie Augustin was advised to limit contact with others to essential tasks such as getting food, medications, and medical care  Proper handwashing reviewed, and Hand sanitzer when washing is not available  If the patient develops symptoms of COVID 19, the patient should call the office as soon as possible  For 6716-5723 Flu season, it is strongly recommended that Flu Vaccinations be obtained  Virtual Visits:  Alexis: This works on smart phones (any phone with Internet browsing capability)    You should get a text message when the provider is ready to see you   Click on the link in the text message, and the call should start  You will need to type in your name, and allow camera and microphone access  This is HIPPA compliant, and secure  If you have not already done so, get immunized to COVID 19  We are committed to getting you vaccinated as soon as possible and will be closely following CDC and [facility]  guidelines as they are released and revised  Please refer to our COVID-19 vaccine webpage for the most up to date information on the vaccine and our distribution efforts  This site will also have the most up to date recommendations for COVID booster vaccine  Marta tn    Call [phone]), option [phone]    OUR NEW LOCATION:    [address], [facility] [address], [address] [facility], [address]  Fax: [phone]    Lab services and OB/GYN are at this location as well

## 2022-03-16 NOTE — ASSESSMENT & PLAN NOTE
Patient does have some swelling in the left foot, which correlates to the numbness that he has had from disc problems previously  Lasix seems to help the most   He also is using ibuprofen  Can continue  Will send a small prescription of Lasix to the pharmacy  Reviewed with him about risks of kidney issues with diuretics

## 2022-03-16 NOTE — PROGRESS NOTES
Assessment and Plan:    Problem List Items Addressed This Visit     Edema of left foot     Patient does have some swelling in the left foot, which correlates to the numbness that he has had from disc problems previously  Lasix seems to help the most   He also is using ibuprofen  Can continue  Will send a small prescription of Lasix to the pharmacy  Reviewed with him about risks of kidney issues with diuretics  Relevant Medications    furosemide (LASIX) 20 mg tablet    Elevated prostate specific antigen (PSA)     PSA elevation noted at last check  This most recent it was 2 0, which is now back to where was before  No change  Check 1 year  Relevant Orders    PSA Total, Diagnostic    Hyperlipidemia    Relevant Orders    Comprehensive metabolic panel    Lipid panel    Hypertension - Primary     Blood pressure today was quite good  Continue hydrochlorothiazide and metoprolol  Relevant Medications    furosemide (LASIX) 20 mg tablet    Other Relevant Orders    Comprehensive metabolic panel    Multiple thyroid nodules     Patient does have multiple thyroid nodules from before  No specific changes at this point  It was recommended that he repeat in 2 years, which will be November of 2023  Other Visit Diagnoses     Essential hypertension        Relevant Medications    furosemide (LASIX) 20 mg tablet                 Diagnoses and all orders for this visit:    Primary hypertension  -     Comprehensive metabolic panel; Future    Essential hypertension    Elevated prostate specific antigen (PSA)  -     PSA Total, Diagnostic; Future    Multiple thyroid nodules    Edema of left foot  -     furosemide (LASIX) 20 mg tablet; Take 1 tablet (20 mg total) by mouth daily    Mixed hyperlipidemia  -     Comprehensive metabolic panel; Future  -     Lipid panel; Future              Subjective:      Patient ID: Sabrina Sandoval is a 76 y o  male      CC:    Chief Complaint   Patient presents with  Follow-up     Patient here for 6 months follow up  Pt will like to discuss left leg numbness  HPI:    Patient is here for several issues  With regard to hypertension, he is on medications  Denies any current problems with them  Thyroid nodules:  Noted before  He did have an ultrasound done in November  Reviewed  Increased PSA:  Repeat PSA was checked  It has decreased since his last check  He did mention that he has some issues with regard to disc problems  There is occasional numbness in the legs  This is after he tried to lift something in February  It seems to aggravate a pre-existing disc problem  No numbness all the time, and no weakness  Seems to be getting better  He has had some foot swelling as well  Is left foot  Lasix helped  Comes and goes  According the patient, does seem to be improving  He is on 20 of Lasix for this  He has been on this in past, with the last time being approximately 2019  The following portions of the patient's history were reviewed and updated as appropriate: allergies, current medications, past family history, past medical history, past social history, past surgical history and problem list       Review of Systems   Constitutional: Negative for chills and fever  HENT: Negative for ear pain and sore throat  Eyes: Negative for pain and visual disturbance  Respiratory: Negative for cough and shortness of breath  Cardiovascular: Positive for leg swelling  Negative for chest pain and palpitations  Gastrointestinal: Negative for abdominal pain and vomiting  Genitourinary: Negative for dysuria and hematuria  Musculoskeletal: Negative for arthralgias and back pain  Skin: Negative for color change and rash  Neurological: Positive for numbness (Left Leg)  Negative for seizures and syncope  All other systems reviewed and are negative          Data to review:       Objective:    Vitals:    03/16/22 1512   BP: 130/88   BP Location: Right arm   Patient Position: Sitting   Cuff Size: Adult   Pulse: 64   Resp: 14   Temp: 98 3 °F (36 8 °C)   TempSrc: Temporal   Weight: 76 8 kg (169 lb 6 4 oz)   Height: 5' 8" (1 727 m)        Physical Exam  Vitals and nursing note reviewed  Constitutional:       Appearance: Normal appearance  Neck:      Vascular: No carotid bruit  Cardiovascular:      Rate and Rhythm: Normal rate and regular rhythm  Pulses: Normal pulses  Carotid pulses are 2+ on the right side and 2+ on the left side  Heart sounds: Normal heart sounds  No murmur heard  No gallop  Pulmonary:      Effort: Pulmonary effort is normal  No respiratory distress  Breath sounds: Normal breath sounds  No stridor  No wheezing, rhonchi or rales  Chest:      Chest wall: No tenderness  Neurological:      Mental Status: He is alert

## 2022-03-16 NOTE — ASSESSMENT & PLAN NOTE
Patient does have multiple thyroid nodules from before  No specific changes at this point  It was recommended that he repeat in 2 years, which will be November of 2023

## 2022-04-25 NOTE — ASSESSMENT & PLAN NOTE
Patient continues to have some right AC joint irritation, as well as left subacromial bursitis  Recommend meloxicam, at patient request   This would replace the naproxen  Follow-up in the future as needed  Three month supply sent to mail-in pharmacy  BMI: BMI (kg/m2): 31.6 (11-29-21 @ 16:35)  HbA1c: A1C with Estimated Average Glucose Result: 5.4 % (11-04-21 @ 06:58)    Glucose: POCT Blood Glucose.: 78 mg/dL (12-30-21 @ 07:34)    BP: 141/56 (04-24-22 @ 20:45) (141/56 - 141/56)  Lipid Panel:

## 2022-05-04 DIAGNOSIS — I10 ESSENTIAL HYPERTENSION: ICD-10-CM

## 2022-05-04 RX ORDER — HYDROCHLOROTHIAZIDE 25 MG/1
TABLET ORAL
Qty: 45 TABLET | Refills: 0 | Status: SHIPPED | OUTPATIENT
Start: 2022-05-04 | End: 2022-08-01

## 2022-05-04 NOTE — TELEPHONE ENCOUNTER
Requested Prescriptions     Pending Prescriptions Disp Refills    hydrochlorothiazide (HYDRODIURIL) 25 mg tablet [Pharmacy Med Name: HYDROCHLOROTHIAZIDE 25 MG TAB] 45 tablet 0     Sig: TAKE 1/2 TABLET BY MOUTH EVERY DAY       LOV 3/16/22 with TH, F/U 9/23/22 with TH, labs pending

## 2022-06-12 DIAGNOSIS — R60.0 EDEMA OF LEFT FOOT: ICD-10-CM

## 2022-06-13 RX ORDER — FUROSEMIDE 20 MG/1
TABLET ORAL
Qty: 90 TABLET | Refills: 0 | Status: SHIPPED | OUTPATIENT
Start: 2022-06-13

## 2022-07-30 DIAGNOSIS — I10 ESSENTIAL HYPERTENSION: ICD-10-CM

## 2022-08-01 RX ORDER — METOPROLOL TARTRATE 50 MG/1
TABLET, FILM COATED ORAL
Qty: 180 TABLET | Refills: 1 | Status: SHIPPED | OUTPATIENT
Start: 2022-08-01

## 2022-08-01 RX ORDER — HYDROCHLOROTHIAZIDE 25 MG/1
TABLET ORAL
Qty: 45 TABLET | Refills: 0 | Status: SHIPPED | OUTPATIENT
Start: 2022-08-01

## 2022-09-11 ENCOUNTER — NURSE TRIAGE (OUTPATIENT)
Dept: OTHER | Facility: OTHER | Age: 77
End: 2022-09-11

## 2022-09-12 ENCOUNTER — OFFICE VISIT (OUTPATIENT)
Dept: FAMILY MEDICINE CLINIC | Facility: CLINIC | Age: 77
End: 2022-09-12
Payer: MEDICARE

## 2022-09-12 VITALS
DIASTOLIC BLOOD PRESSURE: 78 MMHG | OXYGEN SATURATION: 96 % | BODY MASS INDEX: 23.64 KG/M2 | HEIGHT: 68 IN | WEIGHT: 156 LBS | SYSTOLIC BLOOD PRESSURE: 156 MMHG | HEART RATE: 84 BPM | TEMPERATURE: 98.5 F

## 2022-09-12 DIAGNOSIS — R53.1 WEAKNESS: Primary | ICD-10-CM

## 2022-09-12 DIAGNOSIS — I10 PRIMARY HYPERTENSION: ICD-10-CM

## 2022-09-12 DIAGNOSIS — E78.2 MIXED HYPERLIPIDEMIA: ICD-10-CM

## 2022-09-12 DIAGNOSIS — R73.9 HYPERGLYCEMIA: ICD-10-CM

## 2022-09-12 LAB
SARS-COV-2 AG UPPER RESP QL IA: NEGATIVE
SL AMB  POCT GLUCOSE, UA: NEGATIVE
SL AMB LEUKOCYTE ESTERASE,UA: NEGATIVE
SL AMB POCT BILIRUBIN,UA: NEGATIVE
SL AMB POCT BLOOD,UA: NEGATIVE
SL AMB POCT CLARITY,UA: CLEAR
SL AMB POCT COLOR,UA: NORMAL
SL AMB POCT KETONES,UA: NEGATIVE
SL AMB POCT NITRITE,UA: NEGATIVE
SL AMB POCT PH,UA: 5.5
SL AMB POCT SPECIFIC GRAVITY,UA: 1.02
SL AMB POCT URINE PROTEIN: NEGATIVE
SL AMB POCT UROBILINOGEN: 0.2
VALID CONTROL: NORMAL

## 2022-09-12 PROCEDURE — 87086 URINE CULTURE/COLONY COUNT: CPT | Performed by: FAMILY MEDICINE

## 2022-09-12 PROCEDURE — 81002 URINALYSIS NONAUTO W/O SCOPE: CPT | Performed by: FAMILY MEDICINE

## 2022-09-12 PROCEDURE — 87811 SARS-COV-2 COVID19 W/OPTIC: CPT | Performed by: FAMILY MEDICINE

## 2022-09-12 PROCEDURE — 99214 OFFICE O/P EST MOD 30 MIN: CPT | Performed by: FAMILY MEDICINE

## 2022-09-12 NOTE — TELEPHONE ENCOUNTER
Patient called to report he has has consistent mild- moderate weakness since Wednesday  Was seen at urgent care on Thursday  Reports slight improvement but requesting appointment    Appointment scheduled for tomorrow at 1130am

## 2022-09-12 NOTE — TELEPHONE ENCOUNTER
Reason for Disposition   [1] MODERATE weakness (i e , interferes with work, school, normal activities) AND [2] persists > 3 days    Answer Assessment - Initial Assessment Questions  1  DESCRIPTION: "Describe how you are feeling "      I feel like I have a virus  2  SEVERITY: "How bad is it?"  "Can you stand and walk?"    - MILD - Feels weak or tired, but does not interfere with work, school or normal activities    - [facility] to stand and walk; weakness interferes with work, school, or normal activities    - SEVERE - Unable to stand or walk  Mild - moderate  3  ONSET:  "When did the weakness begin?"  Wednesday with slight improvement  4  CAUSE: "What do you think is causing the weakness?"  I went to urgent care on Thursday but I want to see my PCP   5  MEDICINES: "Have you recently started a new medicine or had a change in the amount of a medicine?"  Denies  6  OTHER SYMPTOMS: "Do you have any other symptoms?" (e g , chest pain, fever, cough, SOB, vomiting, diarrhea, bleeding, other areas of pain)  Chills, fever subsided- was 100F, slight nausea intermittently     Denies SOB or chest pain  Tolerating fluids and eating   Urinating normal    Protocols used: WEAKNESS (GENERALIZED) AND FATIGUE-ADULT-

## 2022-09-12 NOTE — PROGRESS NOTES
Assessment and Plan:    Problem List Items Addressed This Visit     Hyperglycemia     Check labs  No changes otherwise  Hyperlipidemia     Stable  Due for check in the future  Not on statins  Hypertension     Patient is doing well today  He did not have a positive tilt test    Continue to follow  Other Visit Diagnoses     Weakness    -  Primary    Question cause  Possible viral gastro, but no significant gastro symptoms  Check UA  Check labs  Relevant Orders    Poct Covid 19 Rapid Antigen Test (Completed)    CBC and differential    Comprehensive metabolic panel    TSH, 3rd generation                 Diagnoses and all orders for this visit:    Weakness  Comments:  Question cause  Possible viral gastro, but no significant gastro symptoms  Check UA  Check labs  Orders:  -     Poct Covid 19 Rapid Antigen Test  -     CBC and differential; Future  -     Comprehensive metabolic panel; Future  -     TSH, 3rd generation; Future  -     Cancel: UA (URINE) with reflex to Scope    Primary hypertension    Mixed hyperlipidemia    Hyperglycemia            Subjective:      Patient ID: Mercedez Gale is a 76 y o  male  CC:    Chief Complaint   Patient presents with    Weakness - Generalized     Patient present today for generalized weakness, shaky, feeling unsteady and having chills since last Wednesday  Patient believes it might be food poisoning  Per patient it started last Wednesday while he was eating since he developed abdominal pain and just sick during his meal        HPI:    See CC  Noted nausea after eating Wednesday  Was just after eating  Noted worse weakness and unsteadiness on Thrusday  Felt chills and shakes  Weak with this as well  Balance was off with this  Ate a bit later Thursday, but not feeling great still  Friday, went to PT with wife  The doctor that his wife went to mentioned that he thought patient had food poisoning    Saturday got a bit better  Today feels strongest since it started  Has increased resp rate at night when sleeping on the back, and on side got better  Feels getting better, but not great  Still had chills last night  No Nausea, vomiting  Had anorexia  No diarrhea, apart from 1 stool  Did Home COVID test Thursday, negative          The following portions of the patient's history were reviewed and updated as appropriate: allergies, current medications, past family history, past medical history, past social history, past surgical history and problem list       Review of Systems      Data to review:       Objective:    Vitals:    09/12/22 1231 09/12/22 1244 09/12/22 1245 09/12/22 1246   BP:  144/68 138/68 156/78   BP Location:  Right arm Right arm Right arm   Patient Position:  Supine Sitting Standing   Cuff Size:  Standard Standard Standard   Pulse:  72 76 84   Temp: 98 5 °F (36 9 °C)      SpO2:  96% 96% 96%   Weight:       Height:            Physical Exam

## 2022-09-12 NOTE — PATIENT INSTRUCTIONS
Problem List Items Addressed This Visit       Hyperglycemia     Check labs  No changes otherwise  Hyperlipidemia     Stable  Due for check in the future  Not on statins  Hypertension     Patient is doing well today  He did not have a positive tilt test    Continue to follow  Other Visit Diagnoses       Weakness    -  Primary    Question cause  Possible viral gastro, but no significant gastro symptoms  Check UA  Check labs  Relevant Orders    Poct Covid 19 Rapid Antigen Test (Completed)    CBC and differential    Comprehensive metabolic panel    TSH, 3rd generation            COVID 19 Instructions    Natasha Vela was advised to limit contact with others to essential tasks such as getting food, medications, and medical care  Proper handwashing reviewed, and Hand sanitzer when washing is not available  If the patient develops symptoms of COVID 19, the patient should call the office as soon as possible  For 4818-0435 Flu season, it is strongly recommended that Flu Vaccinations be obtained  Virtual Visits:  Alexis: This works on smart phones (any phone with Internet browsing capability)  You should get a text message when the provider is ready to see you  Click on the link in the text message, and the call should start  You will need to type in your name, and allow camera and microphone access  This is HIPPA compliant, and secure  If you have not already done so, get immunized to COVID 19  We are committed to getting you vaccinated as soon as possible and will be closely following CDC and [facility]  guidelines as they are released and revised  Please refer to our COVID-19 vaccine webpage for the most up to date information on the vaccine and our distribution efforts      This site will also have the most up to date recommendations for COVID booster alonso Lozano tn    Call [phone] ([phone]), option 7    OUR NEW LOCATION:    [address], [facility] [address], [address] [facility], [address]  Fax: [phone]    Lab services and OB/GYN are at this location as well

## 2022-09-13 ENCOUNTER — APPOINTMENT (OUTPATIENT)
Dept: LAB | Facility: CLINIC | Age: 77
End: 2022-09-13
Payer: MEDICARE

## 2022-09-13 DIAGNOSIS — E78.2 MIXED HYPERLIPIDEMIA: ICD-10-CM

## 2022-09-13 DIAGNOSIS — I10 PRIMARY HYPERTENSION: ICD-10-CM

## 2022-09-13 DIAGNOSIS — R97.20 ELEVATED PROSTATE SPECIFIC ANTIGEN (PSA): ICD-10-CM

## 2022-09-13 DIAGNOSIS — R53.1 WEAKNESS: ICD-10-CM

## 2022-09-13 LAB
ALBUMIN SERPL BCP-MCNC: 3.3 G/DL (ref 3.5–5)
ALP SERPL-CCNC: 56 U/L (ref 46–116)
ALT SERPL W P-5'-P-CCNC: 27 U/L (ref 12–78)
ANION GAP SERPL CALCULATED.3IONS-SCNC: 5 MMOL/L (ref 4–13)
AST SERPL W P-5'-P-CCNC: 14 U/L (ref 5–45)
BACTERIA UR CULT: NORMAL
BASOPHILS # BLD AUTO: 0.05 THOUSANDS/ΜL (ref 0–0.1)
BASOPHILS NFR BLD AUTO: 1 % (ref 0–1)
BILIRUB SERPL-MCNC: 0.74 MG/DL (ref 0.2–1)
BUN SERPL-MCNC: 22 MG/DL (ref 5–25)
CALCIUM ALBUM COR SERPL-MCNC: 10.5 MG/DL (ref 8.3–10.1)
CALCIUM SERPL-MCNC: 9.9 MG/DL (ref 8.3–10.1)
CHLORIDE SERPL-SCNC: 101 MMOL/L (ref 96–108)
CHOLEST SERPL-MCNC: 168 MG/DL
CO2 SERPL-SCNC: 26 MMOL/L (ref 21–32)
CREAT SERPL-MCNC: 1.2 MG/DL (ref 0.6–1.3)
EOSINOPHIL # BLD AUTO: 0.08 THOUSAND/ΜL (ref 0–0.61)
EOSINOPHIL NFR BLD AUTO: 1 % (ref 0–6)
ERYTHROCYTE [DISTWIDTH] IN BLOOD BY AUTOMATED COUNT: 12.8 % (ref 11.6–15.1)
GFR SERPL CREATININE-BSD FRML MDRD: 57 ML/MIN/1.73SQ M
GLUCOSE P FAST SERPL-MCNC: 105 MG/DL (ref 65–99)
HCT VFR BLD AUTO: 42.3 % (ref 36.5–49.3)
HDLC SERPL-MCNC: 33 MG/DL
HGB BLD-MCNC: 13.5 G/DL (ref 12–17)
IMM GRANULOCYTES # BLD AUTO: 0.07 THOUSAND/UL (ref 0–0.2)
IMM GRANULOCYTES NFR BLD AUTO: 1 % (ref 0–2)
LDLC SERPL CALC-MCNC: 109 MG/DL (ref 0–100)
LYMPHOCYTES # BLD AUTO: 1.18 THOUSANDS/ΜL (ref 0.6–4.47)
LYMPHOCYTES NFR BLD AUTO: 14 % (ref 14–44)
MCH RBC QN AUTO: 29.3 PG (ref 26.8–34.3)
MCHC RBC AUTO-ENTMCNC: 31.9 G/DL (ref 31.4–37.4)
MCV RBC AUTO: 92 FL (ref 82–98)
MONOCYTES # BLD AUTO: 1.01 THOUSAND/ΜL (ref 0.17–1.22)
MONOCYTES NFR BLD AUTO: 12 % (ref 4–12)
NEUTROPHILS # BLD AUTO: 6.01 THOUSANDS/ΜL (ref 1.85–7.62)
NEUTS SEG NFR BLD AUTO: 71 % (ref 43–75)
NONHDLC SERPL-MCNC: 135 MG/DL
NRBC BLD AUTO-RTO: 0 /100 WBCS
PLATELET # BLD AUTO: 226 THOUSANDS/UL (ref 149–390)
PMV BLD AUTO: 11.5 FL (ref 8.9–12.7)
POTASSIUM SERPL-SCNC: 3.9 MMOL/L (ref 3.5–5.3)
PROT SERPL-MCNC: 7.1 G/DL (ref 6.4–8.4)
PSA SERPL-MCNC: 2.1 NG/ML (ref 0–4)
RBC # BLD AUTO: 4.6 MILLION/UL (ref 3.88–5.62)
SODIUM SERPL-SCNC: 132 MMOL/L (ref 135–147)
TRIGL SERPL-MCNC: 132 MG/DL
TSH SERPL DL<=0.05 MIU/L-ACNC: 0.91 UIU/ML (ref 0.45–4.5)
WBC # BLD AUTO: 8.4 THOUSAND/UL (ref 4.31–10.16)

## 2022-09-13 PROCEDURE — 80061 LIPID PANEL: CPT

## 2022-09-13 PROCEDURE — 85025 COMPLETE CBC W/AUTO DIFF WBC: CPT

## 2022-09-13 PROCEDURE — 84443 ASSAY THYROID STIM HORMONE: CPT

## 2022-09-13 PROCEDURE — 36415 COLL VENOUS BLD VENIPUNCTURE: CPT

## 2022-09-13 PROCEDURE — 84153 ASSAY OF PSA TOTAL: CPT

## 2022-09-13 PROCEDURE — 80053 COMPREHEN METABOLIC PANEL: CPT

## 2022-09-23 ENCOUNTER — OFFICE VISIT (OUTPATIENT)
Dept: FAMILY MEDICINE CLINIC | Facility: CLINIC | Age: 77
End: 2022-09-23
Payer: MEDICARE

## 2022-09-23 VITALS
HEART RATE: 79 BPM | SYSTOLIC BLOOD PRESSURE: 140 MMHG | DIASTOLIC BLOOD PRESSURE: 66 MMHG | BODY MASS INDEX: 22.85 KG/M2 | HEIGHT: 68 IN | TEMPERATURE: 97.6 F | WEIGHT: 150.8 LBS | RESPIRATION RATE: 16 BRPM

## 2022-09-23 DIAGNOSIS — I10 PRIMARY HYPERTENSION: ICD-10-CM

## 2022-09-23 DIAGNOSIS — E87.1 HYPONATREMIA: ICD-10-CM

## 2022-09-23 DIAGNOSIS — E78.2 MIXED HYPERLIPIDEMIA: ICD-10-CM

## 2022-09-23 DIAGNOSIS — R97.20 ELEVATED PROSTATE SPECIFIC ANTIGEN (PSA): Primary | ICD-10-CM

## 2022-09-23 DIAGNOSIS — R53.1 WEAKNESS: ICD-10-CM

## 2022-09-23 PROCEDURE — 99214 OFFICE O/P EST MOD 30 MIN: CPT | Performed by: FAMILY MEDICINE

## 2022-09-23 NOTE — ASSESSMENT & PLAN NOTE
Blood pressure seems to be doing quite well  Would not make any adjustments today  Check in the future  Currently on metoprolol

## 2022-09-23 NOTE — PATIENT INSTRUCTIONS
Problem List Items Addressed This Visit       Elevated prostate specific antigen (PSA) - Primary     PSA was elevated previously  This most recent test was back down to his baseline  Check in 1 year  Hyperlipidemia     Patient is not currently on statins  Cholesterol was doing quite well  Check 1 year if necessary  Hypertension     Blood pressure seems to be doing quite well  Would not make any adjustments today  Check in the future  Currently on metoprolol  Relevant Orders    Basic metabolic panel          Other Visit Diagnoses       Weakness        Patient's weakness is improving significantly  He feels back to himself almost   Recommend continue with exercise, range of motion, diet  Hyponatremia        Relevant Orders    Basic metabolic panel            COVID 19 Instructions    Joseph Failing was advised to limit contact with others to essential tasks such as getting food, medications, and medical care  Proper handwashing reviewed, and Hand sanitzer when washing is not available  If the patient develops symptoms of COVID 19, the patient should call the office as soon as possible  For 4984-6088 Flu season, it is strongly recommended that Flu Vaccinations be obtained  Virtual Visits:  Alexis: This works on smart phones (any phone with Internet browsing capability)  You should get a text message when the provider is ready to see you  Click on the link in the text message, and the call should start  You will need to type in your name, and allow camera and microphone access  This is HIPPA compliant, and secure  If you have not already done so, get immunized to COVID 19  We are committed to getting you vaccinated as soon as possible and will be closely following CDC and [facility]  guidelines as they are released and revised    Please refer to our COVID-19 vaccine webpage for the most up to date information on the vaccine and our distribution efforts  This site will also have the most up to date recommendations for COVID booster vaccine  Marta hanson    Call [phone] ([phone]), option 7    OUR NEW LOCATION:    [address], [address] [place] [address], [address]  Fax: [phone]    Lab services and OB/GYN are at this location as well

## 2022-09-23 NOTE — PROGRESS NOTES
Assessment and Plan:    Problem List Items Addressed This Visit     Elevated prostate specific antigen (PSA) - Primary     PSA was elevated previously  This most recent test was back down to his baseline  Check in 1 year  Hyperlipidemia     Patient is not currently on statins  Cholesterol was doing quite well  Check 1 year if necessary  Hypertension     Blood pressure seems to be doing quite well  Would not make any adjustments today  Check in the future  Currently on metoprolol  Relevant Orders    Basic metabolic panel      Other Visit Diagnoses     Weakness        Patient's weakness is improving significantly  He feels back to himself almost   Recommend continue with exercise, range of motion, diet  Hyponatremia        Relevant Orders    Basic metabolic panel                 Diagnoses and all orders for this visit:    Elevated prostate specific antigen (PSA)    Weakness  Comments:  Patient's weakness is improving significantly  He feels back to himself almost   Recommend continue with exercise, range of motion, diet  Mixed hyperlipidemia    Primary hypertension  -     Basic metabolic panel; Future    Hyponatremia  -     Basic metabolic panel; Future            Subjective:      Patient ID: Kareen Esposito is a 76 y o  male  CC:    Chief Complaint   Patient presents with    Follow-up     Patient in office for food poisoning follow up  Pt refuse AWV       HPI:    Patient here to follow up on the recent illness  Was having problems with feeling chills  That is better now  Feels that his mind is clear, but was feeling off before  Sleep: He is feeling better with this now  He did take melatonin last night, and thought that was better  Slept well with this this  He also took Tumeric at HS  Uses Tylenol arthritis in AM and at HS as well  Seems to help back pains  Appetite seems to be off still  Seems better now    He is avoiding some spicey food, and is OK, and having Ensure shakes  Wednesday, walked 1 5 mi  Today was 3/4 mi  Yesterday, was working in his shed  Still needs nap in afternoon  The following portions of the patient's history were reviewed and updated as appropriate: allergies, current medications, past family history, past medical history, past social history, past surgical history and problem list       Review of Systems   Constitutional: Negative for chills and fever  HENT: Negative for ear pain and sore throat  Eyes: Negative for pain and visual disturbance  Respiratory: Negative for cough and shortness of breath  Cardiovascular: Negative for chest pain and palpitations  Gastrointestinal: Negative for abdominal pain and vomiting  Genitourinary: Negative for dysuria and hematuria  Musculoskeletal: Negative for arthralgias and back pain  Skin: Negative for color change and rash  Neurological: Negative for seizures and syncope  All other systems reviewed and are negative  Data to review:   TSH 0 907  WBC 8 4, HGB13 5,  HCT 42 3, platelets 050  PSA 2 1  Total cholesterol 168, , HDL 33, triglycerides 132  Sodium 132, potassium 3 9, calcium 9 9  Blood sugar 105  AST 14, ALT 27  Creatinine 1 20, GFR 57       Objective:    Vitals:    09/23/22 1454   BP: 140/66   BP Location: Right arm   Patient Position: Sitting   Cuff Size: Adult   Pulse: 79   Resp: 16   Temp: 97 6 °F (36 4 °C)   TempSrc: Temporal   Weight: 68 4 kg (150 lb 12 8 oz)   Height: 5' 8" (1 727 m)        Physical Exam

## 2022-10-11 ENCOUNTER — APPOINTMENT (OUTPATIENT)
Dept: LAB | Facility: CLINIC | Age: 77
End: 2022-10-11
Payer: MEDICARE

## 2022-10-11 DIAGNOSIS — I10 PRIMARY HYPERTENSION: ICD-10-CM

## 2022-10-11 DIAGNOSIS — E87.1 HYPONATREMIA: ICD-10-CM

## 2022-10-11 LAB
ANION GAP SERPL CALCULATED.3IONS-SCNC: 1 MMOL/L (ref 4–13)
BUN SERPL-MCNC: 25 MG/DL (ref 5–25)
CALCIUM SERPL-MCNC: 10.3 MG/DL (ref 8.3–10.1)
CHLORIDE SERPL-SCNC: 107 MMOL/L (ref 96–108)
CO2 SERPL-SCNC: 28 MMOL/L (ref 21–32)
CREAT SERPL-MCNC: 1.17 MG/DL (ref 0.6–1.3)
GFR SERPL CREATININE-BSD FRML MDRD: 58 ML/MIN/1.73SQ M
GLUCOSE SERPL-MCNC: 108 MG/DL (ref 65–140)
POTASSIUM SERPL-SCNC: 4.3 MMOL/L (ref 3.5–5.3)
SODIUM SERPL-SCNC: 136 MMOL/L (ref 135–147)

## 2022-10-11 PROCEDURE — 80048 BASIC METABOLIC PNL TOTAL CA: CPT

## 2022-10-11 PROCEDURE — 36415 COLL VENOUS BLD VENIPUNCTURE: CPT

## 2022-11-01 DIAGNOSIS — I10 ESSENTIAL HYPERTENSION: ICD-10-CM

## 2022-11-01 RX ORDER — HYDROCHLOROTHIAZIDE 25 MG/1
TABLET ORAL
Qty: 45 TABLET | Refills: 1 | Status: SHIPPED | OUTPATIENT
Start: 2022-11-01

## 2022-11-02 ENCOUNTER — CLINICAL SUPPORT (OUTPATIENT)
Dept: FAMILY MEDICINE CLINIC | Facility: CLINIC | Age: 77
End: 2022-11-02

## 2022-11-02 DIAGNOSIS — Z23 INFLUENZA VACCINATION GIVEN: Primary | ICD-10-CM

## 2023-01-31 DIAGNOSIS — I10 ESSENTIAL HYPERTENSION: ICD-10-CM

## 2023-01-31 RX ORDER — METOPROLOL TARTRATE 50 MG/1
TABLET, FILM COATED ORAL
Qty: 180 TABLET | Refills: 1 | Status: SHIPPED | OUTPATIENT
Start: 2023-01-31

## 2023-04-03 ENCOUNTER — OFFICE VISIT (OUTPATIENT)
Dept: FAMILY MEDICINE CLINIC | Facility: CLINIC | Age: 78
End: 2023-04-03

## 2023-04-03 VITALS
SYSTOLIC BLOOD PRESSURE: 138 MMHG | OXYGEN SATURATION: 96 % | HEART RATE: 57 BPM | WEIGHT: 158 LBS | HEIGHT: 68 IN | TEMPERATURE: 98.3 F | BODY MASS INDEX: 23.95 KG/M2 | DIASTOLIC BLOOD PRESSURE: 78 MMHG

## 2023-04-03 DIAGNOSIS — R73.9 HYPERGLYCEMIA: ICD-10-CM

## 2023-04-03 DIAGNOSIS — E78.2 MIXED HYPERLIPIDEMIA: ICD-10-CM

## 2023-04-03 DIAGNOSIS — I10 PRIMARY HYPERTENSION: ICD-10-CM

## 2023-04-03 DIAGNOSIS — R19.4 FREQUENT BOWEL MOVEMENTS: Primary | ICD-10-CM

## 2023-04-03 NOTE — PATIENT INSTRUCTIONS
1  Frequent bowel movements  Comments:  Has continued to have some issues with frequent bowel movements  Check stool cultures  Follow-up afterwards  Continue fiber supplement  Orders:  -     Stool Enteric Bacterial Panel by PCR; Future  -     Giardia antigen; Future  -     Ova and parasite examination; Future    2  Primary hypertension  Assessment & Plan:  Blood pressure was reasonable  No change  3  Mixed hyperlipidemia  Assessment & Plan:  Stable at the moment  Not on statins  Given his age, and the fact that it was relatively good previously, we do not have to recheck the lipid panel  4  Hyperglycemia  Assessment & Plan:  Last sugar was quite reasonable  No change  COVID 19 Instructions    Anita Vigilly was advised to limit contact with others to essential tasks such as getting food, medications, and medical care  Proper handwashing reviewed, and Hand sanitzer when washing is not available  If the patient develops symptoms of COVID 19, the patient should call the office as soon as possible  For 7363-1195 Flu season, it is strongly recommended that Flu Vaccinations be obtained  Virtual Visits:  AmWell: This works on smart phones (any phone with Internet browsing capability)  You should get a text message when the provider is ready to see you  Click on the link in the text message, and the call should start  You will need to type in your name, and allow camera and microphone access  This is HIPPA compliant, and secure  If you have not already done so, get immunized to COVID 19  We are committed to getting you vaccinated as soon as possible and will be closely following CDC and [facility]  guidelines as they are released and revised  Please refer to our COVID-19 vaccine webpage for the most up to date information on the vaccine and our distribution efforts      This site will also have the most up to date recommendations for COVID booster alonso Lozano tn    Call [phone] ([phone]), option 7    OUR NEW LOCATION:    [address], [facility] [address], [place], [address]  Fax: [phone]    Lab services and OB/GYN are at this location as well

## 2023-04-03 NOTE — ASSESSMENT & PLAN NOTE
Stable at the moment  Not on statins  Given his age, and the fact that it was relatively good previously, we do not have to recheck the lipid panel

## 2023-04-03 NOTE — PROGRESS NOTES
Name: Sabrina Roe      : 1945      MRN: [id number]  Encounter Provider: Shiv Jett MD  Encounter Date: 4/3/2023   Encounter department: [facility] PRIMARY CARE    Assessment & Plan     1  Frequent bowel movements  Comments:  Has continued to have some issues with frequent bowel movements  Check stool cultures  Follow-up afterwards  Continue fiber supplement  Orders:  -     Stool Enteric Bacterial Panel by PCR; Future  -     Giardia antigen; Future  -     Ova and parasite examination; Future    2  Primary hypertension  Assessment & Plan:  Blood pressure was reasonable  No change  3  Mixed hyperlipidemia  Assessment & Plan:  Stable at the moment  Not on statins  Given his age, and the fact that it was relatively good previously, we do not have to recheck the lipid panel  4  Hyperglycemia  Assessment & Plan:  Last sugar was quite reasonable  No change  Depression Screening and Follow-up Plan: Patient was screened for depression during today's encounter  They screened negative with a PHQ-2 score of 0  Subjective      Chief Complaint   Patient presents with   • Medicare Wellness Visit     Medicare wellness mjs/student        Patient is here to follow-up on multiple issues  He is currently playing pickle ball frequently  Hypertension: Doing well  Still very active  No concerns  On HCTZ, metoprolol tartrate  Has had some continued issues with GI since COVID prior, and from episode of food poisoning  Still has some problems with bowels  Has frequent BM's  Has had some increase in loose stools lately  Happens at least every other week  Review of Systems   Constitutional: Negative  HENT: Negative  Eyes: Negative  Respiratory: Negative  Cardiovascular: Negative  Gastrointestinal: Negative  Endocrine: Negative  Genitourinary: Negative  Musculoskeletal: Negative  Skin: Negative  Allergic/Immunologic: Negative  "  Neurological: Negative  Hematological: Negative  Psychiatric/Behavioral: Negative  Current Outpatient Medications on File Prior to Visit   Medication Sig   • ascorbic acid (VITAMIN C) 500 mg tablet Take 500 mg by mouth daily   • aspirin 81 MG tablet Take 81 mg by mouth once a week   • Cholecalciferol (VITAMIN D3) 37080 units TABS Take 2,000 Units by mouth    • furosemide (LASIX) 20 mg tablet TAKE 1 TABLET BY MOUTH EVERY DAY   • gabapentin (NEURONTIN) 300 mg capsule Take three capsules 4 hours prior to the procedure   • hydrochlorothiazide (HYDRODIURIL) 25 mg tablet TAKE 1/2 TABLET BY MOUTH EVERY DAY   • ipratropium (ATROVENT) 0 06 % nasal spray 2 sprays into each nostril 4 (four) times a day   • metoprolol tartrate (LOPRESSOR) 50 mg tablet TAKE 1 TABLET BY MOUTH TWICE A DAY   • RESTASIS 0 05 % ophthalmic emulsion        Objective     /78   Pulse 57   Temp 98 3 °F (36 8 °C) (Tympanic)   Ht 5' 8\" (1 727 m)   Wt 71 7 kg (158 lb)   SpO2 96%   BMI 24 02 kg/m²     Physical Exam  Vitals and nursing note reviewed  Constitutional:       Appearance: He is well-developed  HENT:      Head: Normocephalic and atraumatic  Cardiovascular:      Rate and Rhythm: Normal rate and regular rhythm  Pulses:           Carotid pulses are 2+ on the right side and 2+ on the left side  Heart sounds: Normal heart sounds  No murmur heard  No friction rub  No gallop  Pulmonary:      Effort: Pulmonary effort is normal  No respiratory distress  Breath sounds: Normal breath sounds  No wheezing or rales  Musculoskeletal:      Cervical back: Normal range of motion and neck supple         Valeriano Sosa MD  "

## 2023-04-27 DIAGNOSIS — I10 ESSENTIAL HYPERTENSION: ICD-10-CM

## 2023-04-27 RX ORDER — HYDROCHLOROTHIAZIDE 25 MG/1
TABLET ORAL
Qty: 45 TABLET | Refills: 1 | Status: SHIPPED | OUTPATIENT
Start: 2023-04-27

## 2023-06-12 ENCOUNTER — TELEPHONE (OUTPATIENT)
Dept: FAMILY MEDICINE CLINIC | Facility: CLINIC | Age: 78
End: 2023-06-12

## 2023-06-12 DIAGNOSIS — M54.50 LEFT-SIDED LOW BACK PAIN WITHOUT SCIATICA, UNSPECIFIED CHRONICITY: Primary | ICD-10-CM

## 2023-06-12 NOTE — TELEPHONE ENCOUNTER
Patient called in and asked if dr Yoshi Nance can place an xray in his chart  For his  Lower back hip area on his left side  Patient stated that he will be going to see s chiropractor soon and wanted him to have something to go off of   Please advise thank you

## 2023-06-14 ENCOUNTER — APPOINTMENT (OUTPATIENT)
Dept: RADIOLOGY | Facility: MEDICAL CENTER | Age: 78
End: 2023-06-14
Payer: MEDICARE

## 2023-06-14 DIAGNOSIS — M54.50 LEFT-SIDED LOW BACK PAIN WITHOUT SCIATICA, UNSPECIFIED CHRONICITY: ICD-10-CM

## 2023-06-14 PROCEDURE — 72110 X-RAY EXAM L-2 SPINE 4/>VWS: CPT

## 2023-06-14 PROCEDURE — 73502 X-RAY EXAM HIP UNI 2-3 VIEWS: CPT

## 2023-06-15 NOTE — RESULT ENCOUNTER NOTE
Tests were not normal, and should follow at  your scheduled Office visit  Please call about this, if [employer] message is not available or not read by patient  The x-ray does show some arthritis  Please follow-up with your chiropractor as you are planning on doing

## 2023-08-08 DIAGNOSIS — I10 ESSENTIAL HYPERTENSION: ICD-10-CM

## 2023-08-08 RX ORDER — METOPROLOL TARTRATE 50 MG/1
TABLET, FILM COATED ORAL
Qty: 180 TABLET | Refills: 1 | Status: SHIPPED | OUTPATIENT
Start: 2023-08-08

## 2023-10-18 ENCOUNTER — OFFICE VISIT (OUTPATIENT)
Dept: FAMILY MEDICINE CLINIC | Facility: CLINIC | Age: 78
End: 2023-10-18
Payer: MEDICARE

## 2023-10-18 VITALS
DIASTOLIC BLOOD PRESSURE: 80 MMHG | WEIGHT: 154 LBS | BODY MASS INDEX: 23.34 KG/M2 | RESPIRATION RATE: 18 BRPM | HEIGHT: 68 IN | HEART RATE: 70 BPM | SYSTOLIC BLOOD PRESSURE: 118 MMHG | OXYGEN SATURATION: 99 %

## 2023-10-18 DIAGNOSIS — I10 PRIMARY HYPERTENSION: Primary | ICD-10-CM

## 2023-10-18 DIAGNOSIS — R73.9 HYPERGLYCEMIA: ICD-10-CM

## 2023-10-18 DIAGNOSIS — Z29.11 NEED FOR RSV IMMUNIZATION: ICD-10-CM

## 2023-10-18 DIAGNOSIS — E78.2 MIXED HYPERLIPIDEMIA: ICD-10-CM

## 2023-10-18 DIAGNOSIS — Z23 NEEDS FLU SHOT: ICD-10-CM

## 2023-10-18 DIAGNOSIS — R97.20 ELEVATED PROSTATE SPECIFIC ANTIGEN (PSA): ICD-10-CM

## 2023-10-18 DIAGNOSIS — Z12.5 PROSTATE CANCER SCREENING: ICD-10-CM

## 2023-10-18 PROBLEM — M71.9 BURSITIS: Status: ACTIVE | Noted: 2023-10-18

## 2023-10-18 PROCEDURE — G0008 ADMIN INFLUENZA VIRUS VAC: HCPCS | Performed by: FAMILY MEDICINE

## 2023-10-18 PROCEDURE — 99214 OFFICE O/P EST MOD 30 MIN: CPT | Performed by: FAMILY MEDICINE

## 2023-10-18 PROCEDURE — 90662 IIV NO PRSV INCREASED AG IM: CPT | Performed by: FAMILY MEDICINE

## 2023-10-18 RX ORDER — GABAPENTIN 100 MG/1
300 CAPSULE ORAL DAILY
COMMUNITY
Start: 2023-07-21 | End: 2023-10-18

## 2023-10-18 NOTE — ASSESSMENT & PLAN NOTE
Patient did have some elevation in cholesterol previously, but he did not wish to start on statins. Recheck. This will reinforce lifestyle modifications as well as dietary changes.

## 2023-10-18 NOTE — PROGRESS NOTES
Name: Marion Yusuf      : 1945      MRN: [id number]  Encounter Provider: Fadumo Flowers MD  Encounter Date: 10/18/2023   Encounter department: [place] [address]     1. Primary hypertension  Assessment & Plan:  Blood pressure excellent. Continue metoprolol and hydrochlorothiazide. Orders:  -     Comprehensive metabolic panel; Future; Expected date: 10/18/2023    2. Mixed hyperlipidemia  Assessment & Plan:  Patient did have some elevation in cholesterol previously, but he did not wish to start on statins. Recheck. This will reinforce lifestyle modifications as well as dietary changes. Orders:  -     Comprehensive metabolic panel; Future; Expected date: 10/18/2023  -     Lipid Panel with Direct LDL reflex; Future; Expected date: 10/18/2023    3. Hyperglycemia  Assessment & Plan:  Check labs. Follow-up afterwards. Limit carbs. Orders:  -     Comprehensive metabolic panel; Future; Expected date: 10/18/2023    4. Elevated prostate specific antigen (PSA)  Assessment & Plan:  PSA 2022 was stable. No changes. Check PSA now, and follow based on result. Reviewed about the SINDY. Orders:  -     PSA, Total Screen; Future; Expected date: 10/18/2023    5. Prostate cancer screening  -     PSA, Total Screen; Future; Expected date: 10/18/2023    6. Needs flu shot  -     influenza vaccine, high-dose, PF 0.7 mL (FLUZONE HIGH-DOSE)    7. Need for RSV immunization  Comments:  Reviewed RSV vaccine. Depression Screening and Follow-up Plan: Patient was screened for depression during today's encounter. They screened negative with a PHQ-2 score of 0. Subjective      Chief Complaint   Patient presents with   • Follow-up     6 months follow up  Pt refused medicare wellness    • Flu Vaccine       Here to follow-up on multiple issues. Reviewed his laboratory studies. The patient had some labs from ENT, as well as stool studies.     As far as the stool studies are concerned, he states that he is feeling much better, did not really have any further problems, so did not have that done. The labs from ENT were pre-MRI, based on sensorineural hearing loss, but at this point, the patient does not feel that he has that and reviewed it with ENT. He has not had the MRI as it is now no longer necessary. Because of that, he also did not have the blood work done, as it is not necessary. Had some hip pain before. Had XR from [facility], and went to Rheumatology. Dx with Bursitis. Patient did have an injection with rheumatology before for the bursitis. Reviewed the x-ray images with the patient today. Hypertension: Currently on hydrochlorothiazide, metoprolol. Reviewed blood pressure. Patient did have edema previously. Did get furosemide, but has not needed to continue that. Review of Systems   Constitutional: Negative. HENT: Negative. Eyes: Negative. Respiratory: Negative. Cardiovascular: Negative. Gastrointestinal: Negative. Endocrine: Negative. Genitourinary: Negative. Musculoskeletal: Negative. Skin: Negative. Allergic/Immunologic: Negative. Neurological: Negative. Hematological: Negative. Psychiatric/Behavioral: Negative.          Current Outpatient Medications on File Prior to Visit   Medication Sig   • ascorbic acid (VITAMIN C) 500 mg tablet Take 500 mg by mouth daily   • aspirin 81 MG tablet Take 81 mg by mouth once a week   • Cholecalciferol (VITAMIN D3) 03573 units TABS Take 2,000 Units by mouth    • furosemide (LASIX) 20 mg tablet TAKE 1 TABLET BY MOUTH EVERY DAY   • hydrochlorothiazide (HYDRODIURIL) 25 mg tablet TAKE 1/2 TABLET BY MOUTH EVERY DAY   • ipratropium (ATROVENT) 0.06 % nasal spray 2 sprays into each nostril 4 (four) times a day   • metoprolol tartrate (LOPRESSOR) 50 mg tablet TAKE 1 TABLET BY MOUTH TWICE A DAY   • RESTASIS 0.05 % ophthalmic emulsion    • [DISCONTINUED] gabapentin (NEURONTIN) 100 mg capsule Take 300 mg by mouth daily   • [DISCONTINUED] gabapentin (NEURONTIN) 300 mg capsule Take three capsules 4 hours prior to the procedure       Objective         /80 (BP Location: Left arm, Patient Position: Sitting, Cuff Size: Large)   Pulse 70   Resp 18   Ht 5' 8" (1.727 m)   Wt 69.9 kg (154 lb)   SpO2 99%   BMI 23.42 kg/m²     Physical Exam  Vitals and nursing note reviewed. Constitutional:       Appearance: He is well-developed. HENT:      Head: Normocephalic and atraumatic. Cardiovascular:      Rate and Rhythm: Normal rate and regular rhythm. Pulses:           Carotid pulses are 2+ on the right side and 2+ on the left side. Heart sounds: Normal heart sounds. No murmur heard. No friction rub. No gallop. Pulmonary:      Effort: Pulmonary effort is normal. No respiratory distress. Breath sounds: Normal breath sounds. No wheezing or rales. Musculoskeletal:      Cervical back: Normal range of motion and neck supple.        Edinson Rocha MD

## 2023-10-18 NOTE — ASSESSMENT & PLAN NOTE
Had injection previously with rheumatology. No specific problems at the moment. Could consider anti-inflammatories, ice, heat, repeat injection. Any of these would be recommended if he has increased symptoms.

## 2023-10-18 NOTE — ASSESSMENT & PLAN NOTE
PSA 9/2022 was stable. No changes. Check PSA now, and follow based on result. Reviewed about the SINDY.

## 2023-10-18 NOTE — PATIENT INSTRUCTIONS
1. Primary hypertension  Assessment & Plan:  Blood pressure excellent. Continue metoprolol and hydrochlorothiazide. Orders:  -     Comprehensive metabolic panel; Future; Expected date: 10/18/2023    2. Mixed hyperlipidemia  Assessment & Plan:  Patient did have some elevation in cholesterol previously, but he did not wish to start on statins. Recheck. This will reinforce lifestyle modifications as well as dietary changes. Orders:  -     Comprehensive metabolic panel; Future; Expected date: 10/18/2023  -     Lipid Panel with Direct LDL reflex; Future; Expected date: 10/18/2023    3. Hyperglycemia  Assessment & Plan:  Check labs. Follow-up afterwards. Limit carbs. Orders:  -     Comprehensive metabolic panel; Future; Expected date: 10/18/2023    4. Elevated prostate specific antigen (PSA)  Assessment & Plan:  PSA 9/2022 was stable. No changes. Check PSA now, and follow based on result. Reviewed about the SINDY. Orders:  -     PSA, Total Screen; Future; Expected date: 10/18/2023    5. Prostate cancer screening  -     PSA, Total Screen; Future; Expected date: 10/18/2023    6. Needs flu shot  -     influenza vaccine, high-dose, PF 0.7 mL (FLUZONE HIGH-DOSE)    7. Need for RSV immunization  Comments:  Reviewed RSV vaccine. COVID 19 Instructions    Primo Doss was advised to limit contact with others to essential tasks such as getting food, medications, and medical care. Proper handwashing reviewed, and Hand sanitzer when washing is not available. If the patient develops symptoms of COVID 19, the patient should call the office as soon as possible. It is strongly recommended that Flu Vaccinations be obtained. Virtual Visits:  Alexis: This works on smart phones (any phone with Internet browsing capability). You should get a text message when the provider is ready to see you. Click on the link in the text message, and the call should start.   You will need to type in your name, and allow camera and microphone access. This is HIPPA compliant, and secure. If you have not already done so, get immunized to COVID 19. We are committed to getting you vaccinated as soon as possible and will be closely following CDC and [facility] guidelines as they are released and revised. Please refer to our COVID-19 vaccine webpage for the most up to date information on the vaccine and our distribution efforts. This site will also have the most up to date recommendations for COVID booster vaccine. [email or web address]    Call [phone] ([phone]), option 7    You can also visit [email or web address]. to find vaccines in your area. OUR LOCATION:    [facility]  [address] [place], [place], [facility] [facility]  Fax: [phone]    Lab services, Rheumatology, and OB/GYN are at this location as well.

## 2023-10-20 DIAGNOSIS — I10 ESSENTIAL HYPERTENSION: ICD-10-CM

## 2023-10-20 RX ORDER — HYDROCHLOROTHIAZIDE 25 MG/1
TABLET ORAL
Qty: 45 TABLET | Refills: 1 | Status: SHIPPED | OUTPATIENT
Start: 2023-10-20

## 2023-11-10 ENCOUNTER — APPOINTMENT (OUTPATIENT)
Dept: LAB | Facility: CLINIC | Age: 78
End: 2023-11-10
Payer: MEDICARE

## 2023-11-10 DIAGNOSIS — Z12.5 PROSTATE CANCER SCREENING: ICD-10-CM

## 2023-11-10 DIAGNOSIS — R73.9 HYPERGLYCEMIA: ICD-10-CM

## 2023-11-10 DIAGNOSIS — R97.20 ELEVATED PROSTATE SPECIFIC ANTIGEN (PSA): ICD-10-CM

## 2023-11-10 DIAGNOSIS — I10 PRIMARY HYPERTENSION: ICD-10-CM

## 2023-11-10 DIAGNOSIS — E78.2 MIXED HYPERLIPIDEMIA: ICD-10-CM

## 2023-11-10 LAB
ALBUMIN SERPL BCP-MCNC: 4.3 G/DL (ref 3.5–5)
ALP SERPL-CCNC: 48 U/L (ref 34–104)
ALT SERPL W P-5'-P-CCNC: 16 U/L (ref 7–52)
ANION GAP SERPL CALCULATED.3IONS-SCNC: 8 MMOL/L
AST SERPL W P-5'-P-CCNC: 16 U/L (ref 13–39)
BILIRUB SERPL-MCNC: 0.5 MG/DL (ref 0.2–1)
BUN SERPL-MCNC: 26 MG/DL (ref 5–25)
CALCIUM SERPL-MCNC: 10.1 MG/DL (ref 8.4–10.2)
CHLORIDE SERPL-SCNC: 105 MMOL/L (ref 96–108)
CHOLEST SERPL-MCNC: 188 MG/DL
CO2 SERPL-SCNC: 27 MMOL/L (ref 21–32)
CREAT SERPL-MCNC: 1.31 MG/DL (ref 0.6–1.3)
GFR SERPL CREATININE-BSD FRML MDRD: 51 ML/MIN/1.73SQ M
GLUCOSE P FAST SERPL-MCNC: 91 MG/DL (ref 65–99)
HDLC SERPL-MCNC: 50 MG/DL
LDLC SERPL CALC-MCNC: 115 MG/DL (ref 0–100)
POTASSIUM SERPL-SCNC: 4.4 MMOL/L (ref 3.5–5.3)
PROT SERPL-MCNC: 6.4 G/DL (ref 6.4–8.4)
PSA SERPL-MCNC: 3.17 NG/ML (ref 0–4)
SODIUM SERPL-SCNC: 140 MMOL/L (ref 135–147)
TRIGL SERPL-MCNC: 115 MG/DL

## 2023-11-10 PROCEDURE — 80053 COMPREHEN METABOLIC PANEL: CPT

## 2023-11-10 PROCEDURE — 36415 COLL VENOUS BLD VENIPUNCTURE: CPT

## 2023-11-10 PROCEDURE — 80061 LIPID PANEL: CPT

## 2023-11-10 PROCEDURE — G0103 PSA SCREENING: HCPCS

## 2023-12-17 ENCOUNTER — RA CDI HCC (OUTPATIENT)
Dept: OTHER | Facility: HOSPITAL | Age: 78
End: 2023-12-17

## 2023-12-17 NOTE — PROGRESS NOTES
HCC coding opportunities       Chart reviewed, no opportunity found: CHART REVIEWED, NO OPPORTUNITY FOUND        Patients Insurance     Medicare Insurance: Medicare

## 2023-12-26 ENCOUNTER — OFFICE VISIT (OUTPATIENT)
Dept: FAMILY MEDICINE CLINIC | Facility: CLINIC | Age: 78
End: 2023-12-26
Payer: MEDICARE

## 2023-12-26 VITALS
OXYGEN SATURATION: 97 % | HEIGHT: 68 IN | SYSTOLIC BLOOD PRESSURE: 120 MMHG | WEIGHT: 157 LBS | BODY MASS INDEX: 23.79 KG/M2 | TEMPERATURE: 99.2 F | DIASTOLIC BLOOD PRESSURE: 68 MMHG | HEART RATE: 74 BPM

## 2023-12-26 DIAGNOSIS — L02.91 ABSCESS: ICD-10-CM

## 2023-12-26 DIAGNOSIS — R97.20 ELEVATED PROSTATE SPECIFIC ANTIGEN (PSA): ICD-10-CM

## 2023-12-26 DIAGNOSIS — I10 PRIMARY HYPERTENSION: Primary | ICD-10-CM

## 2023-12-26 DIAGNOSIS — N18.31 STAGE 3A CHRONIC KIDNEY DISEASE (HCC): ICD-10-CM

## 2023-12-26 DIAGNOSIS — E78.2 MIXED HYPERLIPIDEMIA: ICD-10-CM

## 2023-12-26 DIAGNOSIS — R73.9 HYPERGLYCEMIA: ICD-10-CM

## 2023-12-26 DIAGNOSIS — L20.82 FLEXURAL ECZEMA: ICD-10-CM

## 2023-12-26 DIAGNOSIS — G57.02 PIRIFORMIS SYNDROME OF LEFT SIDE: ICD-10-CM

## 2023-12-26 PROBLEM — N18.9 CKD (CHRONIC KIDNEY DISEASE): Status: ACTIVE | Noted: 2023-12-26

## 2023-12-26 PROBLEM — L30.9 ECZEMA: Status: ACTIVE | Noted: 2023-12-26

## 2023-12-26 PROCEDURE — 99214 OFFICE O/P EST MOD 30 MIN: CPT | Performed by: FAMILY MEDICINE

## 2023-12-26 RX ORDER — CEFADROXIL 1000 MG/1
1 TABLET ORAL
Qty: 10 TABLET | Refills: 0 | Status: SHIPPED | OUTPATIENT
Start: 2023-12-26 | End: 2024-01-05

## 2023-12-26 RX ORDER — HYDROCORTISONE VALERATE 2 MG/G
OINTMENT TOPICAL 2 TIMES DAILY
Qty: 45 G | Refills: 0 | Status: SHIPPED | OUTPATIENT
Start: 2023-12-26

## 2023-12-26 NOTE — PROGRESS NOTES
Name: Parker Hinojosa      : 1945      MRN: [id number]  Encounter Provider: Jose L Higuera MD  Encounter Date: 2023   Encounter department: [facility] PRIMARY CARE    Assessment & Plan     1. Primary hypertension  Assessment & Plan:  Blood pressure today doing extremely well.  Continue hydrochlorothiazide, metoprolol.      2. Mixed hyperlipidemia  Assessment & Plan:  Cholesterol is minimally elevated with the LDL, however his HDL in the past was quite good.  Current HDL was also quite good.  No specific change at the moment.  Recheck in 6 months to 12 months.  He is not currently on medication, therefore 12 months would be more than reasonable.      3. Hyperglycemia  Assessment & Plan:  Patient does have slightly higher sugar.  This was in the past.  With the current blood test it was not elevated.      4. Elevated prostate specific antigen (PSA)  Assessment & Plan:  PSA went up minimally.  Can repeat in 6 months, and then follow-up afterwards.  Reviewed about digital rectal exam, and that it is currently not recommended by urology groups.    Orders:  -     PSA Total, Diagnostic; Future; Expected date: 2024    5. Flexural eczema  Assessment & Plan:  Patient does have what appears to be eczema at the fourth MCP joint on the left hand.  [place].  If no improvement, consider dermatology.    Orders:  -     hydrocortisone valerate (WEST-RITA) 0.2 % ointment; Apply topically 2 (two) times a day    6. Stage 3a chronic kidney disease (HCC)  Assessment & Plan:  Lab Results   Component Value Date    EGFR 51 11/10/2023    EGFR 58 10/11/2022    EGFR 57 2022    CREATININE 1.31 (H) 11/10/2023    CREATININE 1.17 10/11/2022    CREATININE 1.20 2022   Patient does not qualify for diagnosis of CKD.  GFR has been consistently a bit low.  This is likely secondary to age.  Also, with hypertension.  Make sure that he keeps his blood pressure under control, as well as maintain  hydration.      7. Piriformis syndrome of left side  Assessment & Plan:  Patient reports diagnosed with piriformis from his chiropractor.  Has been doing chiropractic treatment.  Recommend continued chiropractic versus going to formal physical therapy.  Consider using anti-inflammatories, with the understanding that those could sometimes affect the kidneys.        8. Abscess  Comments:  Small abscess noted on the left forearm.  Trial Duricef.  If no change, reevaluate.  Orders:  -     cefadroxil (DURICEF) 1000 mg tablet; Take 1 tablet (1,000 mg total) by mouth every 24 hours for 10 days           Subjective      Chief Complaint   Patient presents with   • Follow-up     He wants to skip the AWV   • Results     Discuss PSA results per TS.   • Skin Lesion     Left lower arm. It just popped up a few days ago.   • COVID-19     He tested positive for Covid last week. His symptoms started 12/18.  Would like chest checked.       Patient is here to follow-up on several issues.    He does report that he recently had COVID, and would like to make sure that his lungs are doing well.    Patient has a lesion on the left forearm.  Unsure what it is.  Started about 5 days ago.  Some pain and irritation with it.  No trauma.    Patient also has some irritation at the skin at the MCP joint, on the palm, left third / fourth fingers.    Piriformis syndrome: Patient reports going to chiropractic.  Still has some sharp pains in the left hip.  Has been doing exercises.  Minimal improvement with that.  No medications except Tylenol for it at this point.          Review of Systems   Constitutional: Negative.    HENT: Negative.     Eyes: Negative.    Respiratory: Negative.     Cardiovascular: Negative.    Gastrointestinal: Negative.    Endocrine: Negative.    Genitourinary: Negative.    Musculoskeletal:         Per HPI   Skin: Negative.    Allergic/Immunologic: Negative.    Neurological: Negative.    Hematological: Negative.   "  Psychiatric/Behavioral: Negative.         Current Outpatient Medications on File Prior to Visit   Medication Sig   • ascorbic acid (VITAMIN C) 500 mg tablet Take 500 mg by mouth daily   • Cholecalciferol (VITAMIN D3) 50458 units TABS Take 2,000 Units by mouth    • hydrochlorothiazide (HYDRODIURIL) 25 mg tablet TAKE 1/2 TABLET BY MOUTH EVERY DAY   • metoprolol tartrate (LOPRESSOR) 50 mg tablet TAKE 1 TABLET BY MOUTH TWICE A DAY   • RESTASIS 0.05 % ophthalmic emulsion    • [DISCONTINUED] aspirin 81 MG tablet Take 81 mg by mouth once a week (Patient not taking: Reported on 12/26/2023)   • [DISCONTINUED] furosemide (LASIX) 20 mg tablet TAKE 1 TABLET BY MOUTH EVERY DAY (Patient not taking: Reported on 12/26/2023)   • [DISCONTINUED] ipratropium (ATROVENT) 0.06 % nasal spray 2 sprays into each nostril 4 (four) times a day (Patient not taking: Reported on 12/26/2023)       Objective     Total cholesterol 188, , HDL 50, triglycerides 115.  Sodium 140, potassium 4.4, calcium 10.1.  Creatinine 1.31, GFR 51.  AST 16, ALT 16.  Blood sugar 91.  PSA 3.17, prior was 2.1.    /68   Pulse 74   Temp 99.2 °F (37.3 °C)   Ht 5' 8\" (1.727 m)   Wt 71.2 kg (157 lb)   SpO2 97%   BMI 23.87 kg/m²     Physical Exam  Vitals and nursing note reviewed.   Constitutional:       Appearance: He is well-developed.   HENT:      Head: Normocephalic and atraumatic.   Cardiovascular:      Rate and Rhythm: Normal rate and regular rhythm.      Pulses:           Carotid pulses are 2+ on the right side and 2+ on the left side.     Heart sounds: Normal heart sounds. No murmur heard.     No friction rub. No gallop.   Pulmonary:      Effort: Pulmonary effort is normal. No respiratory distress.      Breath sounds: Normal breath sounds. No wheezing or rales.   Musculoskeletal:      Cervical back: Normal range of motion and neck supple.   Skin:             Jose L Higuera MD    "

## 2023-12-26 NOTE — ASSESSMENT & PLAN NOTE
Patient does have slightly higher sugar.  This was in the past.  With the current blood test it was not elevated.

## 2023-12-26 NOTE — ASSESSMENT & PLAN NOTE
Patient reports diagnosed with piriformis from his chiropractor.  Has been doing chiropractic treatment.  Recommend continued chiropractic versus going to formal physical therapy.  Consider using anti-inflammatories, with the understanding that those could sometimes affect the kidneys.

## 2023-12-26 NOTE — ASSESSMENT & PLAN NOTE
PSA went up minimally.  Can repeat in 6 months, and then follow-up afterwards.  Reviewed about digital rectal exam, and that it is currently not recommended by urology groups.

## 2023-12-26 NOTE — PATIENT INSTRUCTIONS
1. Primary hypertension  Assessment & Plan:  Blood pressure today doing extremely well.  Continue hydrochlorothiazide, metoprolol.      2. Mixed hyperlipidemia  Assessment & Plan:  Cholesterol is minimally elevated with the LDL, however his HDL in the past was quite good.  Current HDL was also quite good.  No specific change at the moment.  Recheck in 6 months to 12 months.  He is not currently on medication, therefore 12 months would be more than reasonable.      3. Hyperglycemia  Assessment & Plan:  Patient does have slightly higher sugar.  This was in the past.  With the current blood test it was not elevated.      4. Elevated prostate specific antigen (PSA)  Assessment & Plan:  PSA went up minimally.  Can repeat in 6 months, and then follow-up afterwards.  Reviewed about digital rectal exam, and that it is currently not recommended by urology groups.    Orders:  -     PSA Total, Diagnostic; Future; Expected date: 06/26/2024    5. Flexural eczema  Assessment & Plan:  Patient does have what appears to be eczema at the fourth MCP joint on the left hand.  [place].  If no improvement, consider dermatology.    Orders:  -     hydrocortisone valerate (WEST-RITA) 0.2 % ointment; Apply topically 2 (two) times a day    6. Stage 3a chronic kidney disease (HCC)  Assessment & Plan:  Lab Results   Component Value Date    EGFR 51 11/10/2023    EGFR 58 10/11/2022    EGFR 57 09/13/2022    CREATININE 1.31 (H) 11/10/2023    CREATININE 1.17 10/11/2022    CREATININE 1.20 09/13/2022   Patient does not qualify for diagnosis of CKD.  GFR has been consistently a bit low.  This is likely secondary to age.  Also, with hypertension.  Make sure that he keeps his blood pressure under control, as well as maintain hydration.      7. Piriformis syndrome of left side  Assessment & Plan:  Patient reports diagnosed with piriformis from his chiropractor.  Has been doing chiropractic treatment.  Recommend continued chiropractic versus going to  formal physical therapy.  Consider using anti-inflammatories, with the understanding that those could sometimes affect the kidneys.        8. Abscess  Comments:  Small abscess noted on the left forearm.  Trial Duricef.  If no change, reevaluate.  Orders:  -     cefadroxil (DURICEF) 1000 mg tablet; Take 1 tablet (1,000 mg total) by mouth every 24 hours for 10 days        COVID 19 Instructions    Parker Hinojosa was advised to limit contact with others to essential tasks such as getting food, medications, and medical care.    Proper handwashing reviewed, and Hand sanitzer when washing is not available.    If the patient develops symptoms of COVID 19, the patient should call the office as soon as possible.    It is strongly recommended that Flu Vaccinations be obtained.      Virtual Visits:  Alexis: This works on smart phones (any phone with Internet browsing capability).  You should get a text message when the provider is ready to see you.  Click on the link in the text message, and the call should start.  You will need to type in your name, and allow camera and microphone access.  This is HIPPA compliant, and secure.      If you have not already done so, get immunized to COVID 19.      We are committed to getting you vaccinated as soon as possible and will be closely following CDC and [facility] guidelines as they are released and revised.  Please refer to our COVID-19 vaccine webpage for the most up to date information on the vaccine and our distribution efforts.    This site will also have the most up to date recommendations for COVID booster vaccine.    [email or web address]-[email or web address]    Call [phone] ([phone]), option 7    You can also visit https://www.vaccines.gov/ to find vaccines in your area.    OUR LOCATION:    [place] [address], [address]  [place], [place], [address] [phone]  Fax: [phone]    Lab services, Rheumatology, and  OB/GYN are at this location as well.

## 2023-12-26 NOTE — ASSESSMENT & PLAN NOTE
Lab Results   Component Value Date    EGFR 51 11/10/2023    EGFR 58 10/11/2022    EGFR 57 09/13/2022    CREATININE 1.31 (H) 11/10/2023    CREATININE 1.17 10/11/2022    CREATININE 1.20 09/13/2022   Patient does not qualify for diagnosis of CKD.  GFR has been consistently a bit low.  This is likely secondary to age.  Also, with hypertension.  Make sure that he keeps his blood pressure under control, as well as maintain hydration.

## 2023-12-26 NOTE — ASSESSMENT & PLAN NOTE
Patient does have what appears to be eczema at the fourth MCP joint on the left hand.  [place].  If no improvement, consider dermatology.

## 2023-12-26 NOTE — ASSESSMENT & PLAN NOTE
Cholesterol is minimally elevated with the LDL, however his HDL in the past was quite good.  Current HDL was also quite good.  No specific change at the moment.  Recheck in 6 months to 12 months.  He is not currently on medication, therefore 12 months would be more than reasonable.

## 2024-01-30 DIAGNOSIS — I10 ESSENTIAL HYPERTENSION: ICD-10-CM

## 2024-01-30 RX ORDER — METOPROLOL TARTRATE 50 MG/1
TABLET, FILM COATED ORAL
Qty: 180 TABLET | Refills: 1 | Status: SHIPPED | OUTPATIENT
Start: 2024-01-30

## 2024-03-05 ENCOUNTER — OFFICE VISIT (OUTPATIENT)
Dept: OBGYN CLINIC | Facility: MEDICAL CENTER | Age: 79
End: 2024-03-05
Payer: MEDICARE

## 2024-03-05 VITALS — BODY MASS INDEX: 23.95 KG/M2 | HEIGHT: 68 IN | WEIGHT: 158 LBS

## 2024-03-05 DIAGNOSIS — G89.29 CHRONIC LEFT-SIDED LOW BACK PAIN WITHOUT SCIATICA: ICD-10-CM

## 2024-03-05 DIAGNOSIS — M54.50 CHRONIC LEFT-SIDED LOW BACK PAIN WITHOUT SCIATICA: ICD-10-CM

## 2024-03-05 DIAGNOSIS — M51.36 LUMBAR DEGENERATIVE DISC DISEASE: ICD-10-CM

## 2024-03-05 DIAGNOSIS — M25.552 PAIN IN LEFT HIP: Primary | ICD-10-CM

## 2024-03-05 DIAGNOSIS — M47.816 FACET ARTHROPATHY, LUMBAR: ICD-10-CM

## 2024-03-05 PROCEDURE — 99204 OFFICE O/P NEW MOD 45 MIN: CPT | Performed by: EMERGENCY MEDICINE

## 2024-03-05 RX ORDER — METHYLPREDNISOLONE 4 MG/1
TABLET ORAL
Qty: 1 EACH | Refills: 0 | Status: SHIPPED | OUTPATIENT
Start: 2024-03-05

## 2024-03-05 NOTE — PATIENT INSTRUCTIONS
While taking the oral steroid Medrol Dose Pack, do not take any NSAIDs such as Advil, Motrin, ibuprofen, Motrin, Aleve, naproxen, Celebrex or Meloxicam.  You can restart the NSAIDs after you finish the steroids.    However you may take Tylenol 500mg every 4-6 hours as needed OR max 1,000mg per dose up to 3 times per day for a total of 3,000mg per day  While taking oral steroids, you may experience mild side effects such as feeling jittery or flushing.  Please call if your side effects are significant or you have any questions.      THEN    You may use Advil (ibuprofen) 400-600mg every 6 hours or at least twice per day (OR Aleve (naproxen) 250-500mg every 12 hours as needed for pain and inflammation).    You may also take Tylenol (acetaminophen) together with Advil (ibuprofen) or Aleve (naproxen) as this is safe and can help decrease your pain levels.  The dosing for Tylenol is 500mg every 4-6 hours as needed OR max 1,000mg per dose up to 3 times per day for a total of 3,000mg per day  *Check with your primary care physician to see if these medications are safe to take and to make sure they do not interfere with your other medications and medical issues.

## 2024-03-05 NOTE — PROGRESS NOTES
Assessment/Plan:    Diagnoses and all orders for this visit:    Pain in left hip  -     Ambulatory Referral to Physical Therapy; Future  -     methylPREDNISolone 4 MG tablet therapy pack; Use as directed on package    Lumbar degenerative disc disease  -     Ambulatory Referral to Physical Therapy; Future  -     methylPREDNISolone 4 MG tablet therapy pack; Use as directed on package    Facet arthropathy, lumbar  -     Ambulatory Referral to Physical Therapy; Future  -     methylPREDNISolone 4 MG tablet therapy pack; Use as directed on package    Chronic left-sided low back pain without sciatica  -     Ambulatory Referral to Physical Therapy; Future  -     methylPREDNISolone 4 MG tablet therapy pack; Use as directed on package    Patient with chronic lower back and left hip pain.  At this point in time I feel like his pain is arising from the lumbar spine.  Reviewed x-rays of the lumbar spine and left hip.  Recommend Medrol Dosepak may transition back to OTC meds instructions have been provided.  Recommend formal physical therapy.    If no improvement to consider MRI of the lumbar spine and referral to pain management    Return in about 6 weeks (around 4/16/2024).      Subjective:   Patient ID: Parker Hinojosa is a 79 y.o. male.    NP presents for worsening left hip pain worse in morning now affecting his pickle ball play.  He does have a history of chronic lower back issues treating with a chiropractor who mentions that he may have piriformis syndrome.  Takes Tylenol, and IBU with meanls occasionally PRN.  He did have x-rays of the hip and lumbar spine in 2023.  States he was previously seen by rheumatology and provided GT CSI 6/2023 for left Bursitis with mild relief          Review of Systems    The following portions of the patient's chart were reviewed and updated as appropriate:   Allergy:  No Known Allergies    Medications:    Current Outpatient Medications:     ascorbic acid (VITAMIN C) 500 mg tablet,  "Take 500 mg by mouth daily, Disp: , Rfl:     Cholecalciferol (VITAMIN D3) 87660 units TABS, Take 2,000 Units by mouth , Disp: , Rfl:     hydrochlorothiazide (HYDRODIURIL) 25 mg tablet, TAKE 1/2 TABLET BY MOUTH EVERY DAY, Disp: 45 tablet, Rfl: 1    hydrocortisone valerate (WEST-RITA) 0.2 % ointment, Apply topically 2 (two) times a day, Disp: 45 g, Rfl: 0    methylPREDNISolone 4 MG tablet therapy pack, Use as directed on package, Disp: 1 each, Rfl: 0    metoprolol tartrate (LOPRESSOR) 50 mg tablet, TAKE 1 TABLET BY MOUTH TWICE A DAY, Disp: 180 tablet, Rfl: 1    RESTASIS 0.05 % ophthalmic emulsion, , Disp: , Rfl:     Patient Active Problem List   Diagnosis    Hyperlipidemia    Hypertension    Allergic rhinitis    Elevated prostate specific antigen (PSA)    Multiple thyroid nodules    Primary osteoarthritis of first carpometacarpal joint of left hand    Osteoarthritis of right acromioclavicular joint    Hyperglycemia    Sebaceous cyst    Insufficiency of tear film of both eyes    Meibomian gland dysfunction of right eye    Posterior vitreous detachment    Edema of left foot    Bursitis    Eczema    CKD (chronic kidney disease)    Piriformis syndrome of left side       Objective:  Ht 5' 8\" (1.727 m)   Wt 71.7 kg (158 lb)   BMI 24.02 kg/m²     Left Hip Exam     Tenderness   The patient is experiencing no tenderness.     Range of Motion   The patient has normal left hip ROM.    Muscle Strength   Flexion: 5/5     Comments:  Knee extension strength 5/5  ROM with no pain  Left lateral hip pain with SLR      Back Exam     Range of Motion   Extension:  abnormal   Flexion:  abnormal     Other   Toe walk: normal  Heel walk: normal            Physical Exam      Neurologic Exam    Procedures    I have personally reviewed pertinent films in PACS. and I have personally reviewed the written report of the pertinent studies.             Past Medical History:   Diagnosis Date    Closed fracture of one rib     Last Assessed:  10/20/13 "    Hyperlipidemia     Hypertension     Left leg numbness 12/17/2019    Onychomycosis of toenail 9/2/2014       Past Surgical History:   Procedure Laterality Date    CRYOTHERAPY Bilateral 05/31/2022    posterior nasal nerve - office procedure - Dr. Britt - [facility]    TONSILLECTOMY         Social History     Socioeconomic History    Marital status: /Civil Union     Spouse name: Not on file    Number of children: Not on file    Years of education: Not on file    Highest education level: Not on file   Occupational History    Occupation: RETIRED   Tobacco Use    Smoking status: Never    Smokeless tobacco: Never   Vaping Use    Vaping status: Never Used   Substance and Sexual Activity    Alcohol use: No     Comment: As per [employer]:  Social drinker    Drug use: No    Sexual activity: Not on file   Other Topics Concern    Not on file   Social History Narrative    CONSUMES 2 CUPS OF COFFEE PER DAY    Activities:  Skiing, tennis     Social Determinants of Health     Financial Resource Strain: Not on file   Food Insecurity: Not on file   Transportation Needs: Not on file   Physical Activity: Not on file   Stress: Not on file   Social Connections: Not on file   Intimate Partner Violence: Not on file   Housing Stability: Not on file       Family History   Problem Relation Age of Onset    Stroke Mother     Alcohol abuse Father     Depression Brother     Suicidality Brother

## 2024-03-21 ENCOUNTER — EVALUATION (OUTPATIENT)
Dept: PHYSICAL THERAPY | Facility: MEDICAL CENTER | Age: 79
End: 2024-03-21
Payer: MEDICARE

## 2024-03-21 DIAGNOSIS — M51.36 LUMBAR DEGENERATIVE DISC DISEASE: ICD-10-CM

## 2024-03-21 DIAGNOSIS — M54.42 CHRONIC LEFT-SIDED LOW BACK PAIN WITH LEFT-SIDED SCIATICA: ICD-10-CM

## 2024-03-21 DIAGNOSIS — M47.816 FACET ARTHROPATHY, LUMBAR: ICD-10-CM

## 2024-03-21 DIAGNOSIS — G89.29 CHRONIC LEFT-SIDED LOW BACK PAIN WITH LEFT-SIDED SCIATICA: ICD-10-CM

## 2024-03-21 DIAGNOSIS — M25.552 PAIN IN LEFT HIP: Primary | ICD-10-CM

## 2024-03-21 PROCEDURE — 97110 THERAPEUTIC EXERCISES: CPT | Performed by: PHYSICAL THERAPIST

## 2024-03-21 NOTE — PROGRESS NOTES
PT Evaluation     Today's date: 3/21/2024  Patient name: Parker Hinojosa  : 1945  MRN: [id number]  Referring provider: Enrrique Marin MD  Dx:   Encounter Diagnoses   Name Primary?    Lumbar degenerative disc disease     Facet arthropathy, lumbar     Chronic left-sided low back pain with left-sided sciatica     Pain in left hip Yes                  Assessment  Assessment details: Parker Hinojosa is a 80 y/o male who presents with complaints of left sided low back and hip pain.  The patient's greatest concern is pain c/ functional activities.  Primary movement impairment diagnosis of lumbar and hip hypomobility, as well as hip accessory weakness, resulting in pathoanatomical symptoms chronic left-sided low back and hip pain, which limits his ability to perform functional activities without pain.  Pt. will benefit from skilled PT services that includes manual therapy techniques to enhance tissue extensibility, neuromuscular re-education to facilitate motor control, therapeutic exercise to increase functional mobility, and modalities prn to reduce pain and inflammation.   Impairments: abnormal muscle firing, abnormal or restricted ROM, abnormal movement, activity intolerance, impaired physical strength, lacks appropriate home exercise program, pain with function, poor posture  and poor body mechanics  Understanding of Dx/Px/POC: good   Prognosis: good    Goals  Impairment Goals  - Pt I with initial HEP in 1-2 visits  - Improve ROM equal to contralateral side in 4-6 weeks  - Increase strength to 5/5 in all affected areas in 4-6 weeks    Functional Goals  - Increase Functional Status Measure to: goal status in 6-8 weeks  - Patient will be independent with comprehensive HEP in 6-8 weeks  - Patient will be able to perform daily activities with less pain at time of discharge  - Patient will be able to ride his bike and play pickleball with reduced pain at time of discharge       Plan  Plan details: 1-2x/week for  6-8 weeks  Patient would benefit from: PT darleen  Planned modality interventions: thermotherapy: hydrocollator packs  Planned therapy interventions: joint mobilization, manual therapy, neuromuscular re-education, patient education, strengthening, stretching, therapeutic activities, therapeutic exercise, postural training, home exercise program, graded exercise, flexibility, body mechanics training and abdominal trunk stabilization  Treatment plan discussed with: patient    Subjective Evaluation    History of Present Illness  Mechanism of injury: Patient c/o a long standing hx of left sided low back and hip pain.  He reports that his left hip pain that started in .  He does not recall a specific MALINI.  He has occasional L LE tingling and numbness.  This is not present at the moment.  No changes in bowel/bladder.  Patient followed up c/ Dr. Marin and was prescribed a Medrol Dosepak with much relief.  X-ray revealed degenerative changes as described.  He has gone to the chiropractor.  The pain is worse in the morning after standing and moving around.  He has a tendency to sleep on his left side.  He did play pickleball today without difficulty.  He would like to be in the best possible shape he can for this spring/summer.  He enjoys riding bicycle and playing pickleball.     Patient Goals  Patient goals for therapy: decreased pain, increased strength, independence with ADLs/IADLs, return to sport/leisure activities and increased motion    Pain  Current pain ratin  At best pain ratin  At worst pain ratin  Quality: sharp  Relieving factors: heat and medications  Exacerbated by: Standing and moving first thing in the morning.      Diagnostic Tests  Abnormal x-ray: Degenerative changes as described..  Treatments  Previous treatment: chiropractic and medication  Current treatment: physical therapy      Objective     Concurrent Complaints  Negative for night pain, disturbed sleep, bladder  dysfunction, bowel dysfunction, saddle (S4) numbness, cardiac problem, kidney problem, gallbladder problem, stomach problem, ulcer, appendix problem, spleen problem, pancreas problem, history of cancer, history of trauma and infection    Postural Observations  Seated posture: fair  Standing posture: fair      Palpation   Left   Tenderness of the piriformis.     Tenderness     Left Hip   Tenderness in the greater trochanter.     Lumbar Screen  Lumbar range of motion within normal limits with the following exceptions:Limited lumbar ROM throughout.    Neurological Testing     Sensation     Hip   Left Hip   Intact: light touch    Right Hip   Intact: light touch    Reflexes   Left   Patellar (L4): normal (2+)  Achilles (S1): normal (2+)    Right   Patellar (L4): normal (2+)  Achilles (S1): normal (2+)    Passive Range of Motion     Right Hip   Normal passive range of motion    Additional Passive Range of Motion Details  PAROM:  Pain and hypomobility at L4-S1 on the left c/ UPAs.  Limited L hip ER and extension c/ PROM    Joint Play   Left Hip   Hypomobile in the posterior hip capsule, anterior hip capsule, lateral hip capsule and long axis distraction.    Right Hip   Joints within functional limits are the posterior hip capsule, anterior hip capsule, long axis distraction and long axis distraction.   Hypomobile in the long axis distraction    Strength/Myotome Testing     Left Hip   Planes of Motion   Flexion: 5  Extension: 3-  Abduction: 3-  Adduction: 3-    Isolated Muscles   Gluteus keo: 3-  Gluteus medius: 3-  Iliopsoas: 4-    Right Hip   Planes of Motion   Flexion: 5  Extension: 4-  Abduction: 4-  Adduction: 3-    Isolated Muscles   Gluteus maximums: 4-  Gluteus medius: 4-  Iliopsoas: 4    Tests     Lumbar     Left   Negative slump test.     Right   Negative slump test.     Left Hip   Positive OCTAVIO, long sit and [place].   Negative FADIR.   Modified Miki: Positive.   90/90 SLR: Positive.     Right Hip   90/90  SLR: Positive.     Additional Tests Details  (+) sciatic n tension on the left  (+) Functional leg length discrepancy    Functional Assessment      Squat    Sitting toward right side.     Posterior weight shift: moderate    Depth of femur height: above 90 degrees           Precautions: HTN      Manuals 3/21            UPAs L4-S1             L hip mobs                                       Neuro Re-Ed             TA isometrics                                                                                           Ther Ex                          Hamstring mobility sup str 10x2s            Sciatic n glide sup 10x            SKTC 20x5s            LTR 20x5s                                                   Ther Activity                                       Gait Training                                       Modalities             BISHNU Brady, PT  3/22/2024,8:39 AM

## 2024-03-22 PROCEDURE — 97161 PT EVAL LOW COMPLEX 20 MIN: CPT | Performed by: PHYSICAL THERAPIST

## 2024-03-29 ENCOUNTER — OFFICE VISIT (OUTPATIENT)
Dept: PHYSICAL THERAPY | Facility: MEDICAL CENTER | Age: 79
End: 2024-03-29
Payer: MEDICARE

## 2024-03-29 DIAGNOSIS — G89.29 CHRONIC LEFT-SIDED LOW BACK PAIN WITH LEFT-SIDED SCIATICA: ICD-10-CM

## 2024-03-29 DIAGNOSIS — M54.42 CHRONIC LEFT-SIDED LOW BACK PAIN WITH LEFT-SIDED SCIATICA: ICD-10-CM

## 2024-03-29 DIAGNOSIS — M47.816 FACET ARTHROPATHY, LUMBAR: ICD-10-CM

## 2024-03-29 DIAGNOSIS — M51.36 LUMBAR DEGENERATIVE DISC DISEASE: Primary | ICD-10-CM

## 2024-03-29 DIAGNOSIS — M25.552 PAIN IN LEFT HIP: ICD-10-CM

## 2024-03-29 PROCEDURE — 97140 MANUAL THERAPY 1/> REGIONS: CPT | Performed by: PHYSICAL THERAPIST

## 2024-03-29 PROCEDURE — 97110 THERAPEUTIC EXERCISES: CPT | Performed by: PHYSICAL THERAPIST

## 2024-03-29 NOTE — PROGRESS NOTES
Daily Note     Today's date: 3/29/2024  Patient name: Parker Hinojosa  : 1945  MRN: [id number]  Referring provider: Enrrique Marin MD  Dx:   Encounter Diagnosis     ICD-10-CM    1. Lumbar degenerative disc disease  M51.36       2. Facet arthropathy, lumbar  M47.816       3. Chronic left-sided low back pain with left-sided sciatica  M54.42     G89.29       4. Pain in left hip  M25.552                      Subjective: Patient states that his symptoms have been flared up until this morning.  He played [employer] ball yesterday.       Objective: See treatment diary below.      Assessment:  Patient presents c/ antalgic gait c/ decreased weight bearing on the L LE d/t low back and hip discomfort.  The Medrol Dosepak was successful, but the symptoms have somewhat returned.  Patient demonstrates lumbar hypomobility and has difficulty tolerating prone positioning.  He did well c/ gentle side lying mobilizations to reduce nerve compression.  Patient performed exercises without pain post manuals.  He may benefit from pain management consult in the future if symptoms do not subside.  Tolerated treatment well. Patient would benefit from continued PT      Plan: Continue per plan of care.      Precautions: HTN      Manuals 3/21 3/29           UPAs L4-S1             L hip mobs  AZ           R sidelying lumbar mob  Grade II AZ           STM/MFR L hip  AZ           Neuro Re-Ed             TA isometrics                                                                                           Ther Ex                          Hamstring mobility sup str 10x2s 10x2s           Sciatic n glide sup 10x 10x           SKTC 20x5s 20x5s           LTR 20x5s 20x5s                                                  Ther Activity                                       Gait Training                                       Modalities               10' sup

## 2024-04-02 ENCOUNTER — APPOINTMENT (OUTPATIENT)
Dept: PHYSICAL THERAPY | Facility: MEDICAL CENTER | Age: 79
End: 2024-04-02
Payer: MEDICARE

## 2024-04-03 ENCOUNTER — OFFICE VISIT (OUTPATIENT)
Dept: PHYSICAL THERAPY | Facility: MEDICAL CENTER | Age: 79
End: 2024-04-03
Payer: MEDICARE

## 2024-04-03 DIAGNOSIS — G89.29 CHRONIC LEFT-SIDED LOW BACK PAIN WITH LEFT-SIDED SCIATICA: ICD-10-CM

## 2024-04-03 DIAGNOSIS — M47.816 FACET ARTHROPATHY, LUMBAR: ICD-10-CM

## 2024-04-03 DIAGNOSIS — M25.552 PAIN IN LEFT HIP: ICD-10-CM

## 2024-04-03 DIAGNOSIS — M51.36 LUMBAR DEGENERATIVE DISC DISEASE: Primary | ICD-10-CM

## 2024-04-03 DIAGNOSIS — M54.42 CHRONIC LEFT-SIDED LOW BACK PAIN WITH LEFT-SIDED SCIATICA: ICD-10-CM

## 2024-04-03 PROCEDURE — 97140 MANUAL THERAPY 1/> REGIONS: CPT | Performed by: PHYSICAL THERAPIST

## 2024-04-03 PROCEDURE — 97110 THERAPEUTIC EXERCISES: CPT | Performed by: PHYSICAL THERAPIST

## 2024-04-03 NOTE — PROGRESS NOTES
Daily Note     Today's date: 4/3/2024  Patient name: Parker Hinojosa  : 1945  MRN: [id number]  Referring provider: Enrrique Marin MD  Dx:   Encounter Diagnosis     ICD-10-CM    1. Lumbar degenerative disc disease  M51.36       2. Facet arthropathy, lumbar  M47.816       3. Chronic left-sided low back pain with left-sided sciatica  M54.42     G89.29       4. Pain in left hip  M25.552                      Subjective: Patient states that he felt good after lv, but felt sore after playing pickle ball on Saturday.      Objective: See treatment diary below.      Assessment:  Patient presents c/ soreness in the left low back and hip region c/ antalgic gait pattern.  The irritiability of his symptoms are worse since Saturday.  Patient education on HEP and Tolerated treatment well. Patient would benefit from continued PT.      Plan: Continue per plan of care.      Precautions: HTN      Manuals 3/21 3/29 4/3          UPAs L4-S1             L hip mobs  AZ           R sidelying lumbar mob  Grade II AZ AZ Grade II          STM/MFR L hip  AZ AZ          Neuro Re-Ed             TA isometrics                                                                                           Ther Ex                          Hamstring mobility sup str 10x2s 10x2s 10x2s          Sciatic n glide sup 10x 10x 10x          SKTC 20x5s 20x5s 20x5s          LTR 20x5s 20x5s 20x5s          Seated Piriformis str   3x10s                                    Ther Activity                                       Gait Training                                       Modalities             MH  10' sup 10' sup

## 2024-04-05 ENCOUNTER — OFFICE VISIT (OUTPATIENT)
Dept: PHYSICAL THERAPY | Facility: MEDICAL CENTER | Age: 79
End: 2024-04-05
Payer: MEDICARE

## 2024-04-05 DIAGNOSIS — M47.816 FACET ARTHROPATHY, LUMBAR: ICD-10-CM

## 2024-04-05 DIAGNOSIS — M54.42 CHRONIC LEFT-SIDED LOW BACK PAIN WITH LEFT-SIDED SCIATICA: ICD-10-CM

## 2024-04-05 DIAGNOSIS — M25.552 PAIN IN LEFT HIP: ICD-10-CM

## 2024-04-05 DIAGNOSIS — G89.29 CHRONIC LEFT-SIDED LOW BACK PAIN WITH LEFT-SIDED SCIATICA: ICD-10-CM

## 2024-04-05 DIAGNOSIS — M51.36 LUMBAR DEGENERATIVE DISC DISEASE: Primary | ICD-10-CM

## 2024-04-05 PROCEDURE — 97110 THERAPEUTIC EXERCISES: CPT | Performed by: PHYSICAL THERAPIST

## 2024-04-05 PROCEDURE — 97140 MANUAL THERAPY 1/> REGIONS: CPT | Performed by: PHYSICAL THERAPIST

## 2024-04-05 NOTE — PROGRESS NOTES
Daily Note     Today's date: 2024  Patient name: Parker Hinojosa  : 1945  MRN: [id number]  Referring provider: Enrrique Marin MD  Dx:   Encounter Diagnosis     ICD-10-CM    1. Lumbar degenerative disc disease  M51.36       2. Facet arthropathy, lumbar  M47.816       3. Chronic left-sided low back pain with left-sided sciatica  M54.42     G89.29       4. Pain in left hip  M25.552                      Subjective: Patient states that he is doing well.      Objective: See treatment diary below.      Assessment:  Patient presents c/ decreased pain today.  He made an effort to decrease the amount of physical strain he is putting on his back when assisting around the house.  He has also not participated in [employer] since lv.  Patient demonstrates good tolerance to exercises without pain.  Tolerated treatment well. Patient would benefit from continued PT.  Will add strengthening as symptom irritability allows.      Plan: Continue per plan of care.      Precautions: HTN      Manuals 3/21 3/29 4/3 4/5         UPAs L4-S1             L hip mobs  AZ           R sidelying lumbar mob  Grade II AZ AZ Grade II AZ Grade II         STM/MFR L hip  AZ AZ AZ         Neuro Re-Ed             TA isometrics                                                                                           Ther Ex                          Hamstring mobility sup str 10x2s 10x2s 10x2s 10x2s         Sciatic n glide sup 10x 10x 10x 10x         SKTC 20x5s 20x5s 20x5s 20x5s          LTR 20x5s 20x5s 20x5s 20x5s         Seated Piriformis str   3x10s 3x10s         Sup piriformis str    3x30s                      Ther Activity                                       Gait Training                                       Modalities             MH  10' sup 10' sup 10' sup

## 2024-04-10 ENCOUNTER — OFFICE VISIT (OUTPATIENT)
Dept: PHYSICAL THERAPY | Facility: MEDICAL CENTER | Age: 79
End: 2024-04-10
Payer: MEDICARE

## 2024-04-10 DIAGNOSIS — G89.29 CHRONIC LEFT-SIDED LOW BACK PAIN WITH LEFT-SIDED SCIATICA: ICD-10-CM

## 2024-04-10 DIAGNOSIS — M51.36 LUMBAR DEGENERATIVE DISC DISEASE: Primary | ICD-10-CM

## 2024-04-10 DIAGNOSIS — M54.42 CHRONIC LEFT-SIDED LOW BACK PAIN WITH LEFT-SIDED SCIATICA: ICD-10-CM

## 2024-04-10 DIAGNOSIS — M47.816 FACET ARTHROPATHY, LUMBAR: ICD-10-CM

## 2024-04-10 PROCEDURE — 97110 THERAPEUTIC EXERCISES: CPT | Performed by: PHYSICAL THERAPIST

## 2024-04-10 PROCEDURE — 97140 MANUAL THERAPY 1/> REGIONS: CPT | Performed by: PHYSICAL THERAPIST

## 2024-04-10 NOTE — PROGRESS NOTES
Daily Note     Today's date: 4/10/2024  Patient name: Parker Hinojosa  : 1945  MRN: [id number]  Referring provider: Enrrique Marin MD  Dx:   Encounter Diagnosis     ICD-10-CM    1. Lumbar degenerative disc disease  M51.36       2. Facet arthropathy, lumbar  M47.816       3. Chronic left-sided low back pain with left-sided sciatica  M54.42     G89.29                      Subjective:  Patient states that he felt pretty good over the weekend, but was sore last night for some reason.      Objective: See treatment diary below.      Assessment:  Patient demonstrates good tolerance to progressions.  Tolerated treatment well. Patient would benefit from continued PT.      Plan: Continue per plan of care.      Precautions: HTN      Manuals 3/21 3/29 4/3 4/5 4/10        UPAs L4-S1             L hip mobs  AZ           R sidelying lumbar mob  Grade II AZ AZ Grade II AZ Grade II AZ Grade II        STM/MFR L hip  AZ AZ AZ AZ        Neuro Re-Ed             TA isometrics     2x10 5s                                                                                      Ther Ex             Hip add     2x10 5s        Clams     2x10 5s        Hamstring mobility sup str 10x2s 10x2s 10x2s 10x2s 10x2s        Sciatic n glide sup 10x 10x 10x 10x 10x        SKTC 20x5s 20x5s 20x5s 20x5s  20x5s        LTR 20x5s 20x5s 20x5s 20x5s 20x5s        Seated Piriformis str   3x10s 3x10s 3x10s        Sup piriformis str    3x30s 3x30s                     Ther Activity                                       Gait Training                                       Modalities               10' sup 10' sup 10' sup dec

## 2024-04-12 ENCOUNTER — OFFICE VISIT (OUTPATIENT)
Dept: PHYSICAL THERAPY | Facility: MEDICAL CENTER | Age: 79
End: 2024-04-12
Payer: MEDICARE

## 2024-04-12 DIAGNOSIS — G89.29 CHRONIC LEFT-SIDED LOW BACK PAIN WITH LEFT-SIDED SCIATICA: ICD-10-CM

## 2024-04-12 DIAGNOSIS — M54.42 CHRONIC LEFT-SIDED LOW BACK PAIN WITH LEFT-SIDED SCIATICA: ICD-10-CM

## 2024-04-12 DIAGNOSIS — M51.36 LUMBAR DEGENERATIVE DISC DISEASE: Primary | ICD-10-CM

## 2024-04-12 DIAGNOSIS — M47.816 FACET ARTHROPATHY, LUMBAR: ICD-10-CM

## 2024-04-12 DIAGNOSIS — M25.552 PAIN IN LEFT HIP: ICD-10-CM

## 2024-04-12 PROCEDURE — 97110 THERAPEUTIC EXERCISES: CPT | Performed by: PHYSICAL THERAPIST

## 2024-04-12 PROCEDURE — 97140 MANUAL THERAPY 1/> REGIONS: CPT | Performed by: PHYSICAL THERAPIST

## 2024-04-12 NOTE — PROGRESS NOTES
Daily Note     Today's date: 2024  Patient name: Parker Hinojosa  : 1945  MRN: [id number]  Referring provider: Enrrique Marin MD  Dx:   Encounter Diagnosis     ICD-10-CM    1. Lumbar degenerative disc disease  M51.36       2. Facet arthropathy, lumbar  M47.816       3. Chronic left-sided low back pain with left-sided sciatica  M54.42     G89.29       4. Pain in left hip  M25.552                      Subjective:  Patient states that he is doing pretty well.      Objective: See treatment diary below.      Assessment:  Patient demonstrates good tolerance to progressions c/ decrease pain overall.  Tolerated treatment well. Patient would benefit from continued PT.      Plan: Continue per plan of care.      Precautions: HTN      Manuals 3/21 3/29 4/3 4/5 4/10 4/12       UPAs L4-S1             L hip mobs  AZ           R sidelying lumbar mob  Grade II AZ AZ Grade II AZ Grade II AZ Grade II AZ Grade II       STM/MFR L hip  AZ AZ AZ AZ AZ       Neuro Re-Ed             TA isometrics     2x10 5s 2x10 5s                                                                                     Ther Ex             Hip add     2x10 5s 2x10 5s       Clams     2x10 5s 3x10 5s       Hip abd      2x10 5s green       Hamstring mobility sup str 10x2s 10x2s 10x2s 10x2s 10x2s 10x2s       Sciatic n glide sup 10x 10x 10x 10x 10x 10x       SKTC 20x5s 20x5s 20x5s 20x5s  20x5s 20x5s       LTR 20x5s 20x5s 20x5s 20x5s 20x5s 20x5s       Seated Piriformis str   3x10s 3x10s 3x10s        Sup piriformis str    3x30s 3x30s 3x30s                    Ther Activity                                       Gait Training                                       Modalities             MH  10' sup 10 sup 10' sup dec

## 2024-04-15 ENCOUNTER — APPOINTMENT (OUTPATIENT)
Dept: PHYSICAL THERAPY | Facility: MEDICAL CENTER | Age: 79
End: 2024-04-15
Payer: MEDICARE

## 2024-04-16 ENCOUNTER — OFFICE VISIT (OUTPATIENT)
Dept: OBGYN CLINIC | Facility: MEDICAL CENTER | Age: 79
End: 2024-04-16
Payer: MEDICARE

## 2024-04-16 ENCOUNTER — APPOINTMENT (OUTPATIENT)
Dept: PHYSICAL THERAPY | Facility: MEDICAL CENTER | Age: 79
End: 2024-04-16
Payer: MEDICARE

## 2024-04-16 VITALS
BODY MASS INDEX: 24.4 KG/M2 | SYSTOLIC BLOOD PRESSURE: 124 MMHG | HEART RATE: 56 BPM | HEIGHT: 68 IN | DIASTOLIC BLOOD PRESSURE: 76 MMHG | WEIGHT: 161 LBS

## 2024-04-16 DIAGNOSIS — M70.62 TROCHANTERIC BURSITIS OF LEFT HIP: ICD-10-CM

## 2024-04-16 DIAGNOSIS — M25.552 PAIN IN LEFT HIP: ICD-10-CM

## 2024-04-16 DIAGNOSIS — G89.29 CHRONIC LEFT-SIDED LOW BACK PAIN WITHOUT SCIATICA: Primary | ICD-10-CM

## 2024-04-16 DIAGNOSIS — M54.50 CHRONIC LEFT-SIDED LOW BACK PAIN WITHOUT SCIATICA: Primary | ICD-10-CM

## 2024-04-16 DIAGNOSIS — M47.816 FACET ARTHROPATHY, LUMBAR: ICD-10-CM

## 2024-04-16 DIAGNOSIS — M51.36 LUMBAR DEGENERATIVE DISC DISEASE: ICD-10-CM

## 2024-04-16 DIAGNOSIS — S46.911A ELBOW STRAIN, RIGHT, INITIAL ENCOUNTER: ICD-10-CM

## 2024-04-16 PROCEDURE — 20610 DRAIN/INJ JOINT/BURSA W/O US: CPT | Performed by: EMERGENCY MEDICINE

## 2024-04-16 PROCEDURE — 99213 OFFICE O/P EST LOW 20 MIN: CPT | Performed by: EMERGENCY MEDICINE

## 2024-04-16 RX ADMIN — METHYLPREDNISOLONE ACETATE 1 ML: 40 INJECTION, SUSPENSION INTRA-ARTICULAR; INTRALESIONAL; INTRAMUSCULAR; SOFT TISSUE at 15:00

## 2024-04-16 RX ADMIN — LIDOCAINE HYDROCHLORIDE 4 ML: 10 INJECTION, SOLUTION INFILTRATION; PERINEURAL at 15:00

## 2024-04-16 NOTE — PATIENT INSTRUCTIONS
Steroid Joint Injection   AMBULATORY CARE:   What you need to know about steroid joint injection:  A steroid joint injection is a procedure to inject steroid medicine into a joint. Steroid medicine decreases pain and inflammation. The injection may also contain an anesthetic (numbing medicine) to decrease pain. It may be done to treat conditions such as arthritis, gout, or carpal tunnel syndrome. The injections may be given in your knee, ankle, shoulder, elbow, or wrist. Injections may also be given in your hip, toe, thumb, or finger.  How to prepare for steroid joint injection:  Your healthcare provider will talk to you about how to prepare for this procedure. He or she will tell you what medicines to take or not take on the day of your procedure. You may need to stop taking blood thinners several days before your procedure.   What will happen during steroid joint injection:  You may be given local anesthesia to numb the area where the injection will be given. With local anesthesia, you may still feel pressure during the procedure, but you should not feel any pain. Your provider may use ultrasound or fluoroscopy (a type of x-ray) to guide the needle to the right area. He or she will then inject the steroid into your joint. A bandage will be placed on the injection site.   What will happen after steroid joint injection:  You may have redness, warmth, or sweating in your face and chest right after the steroid injection. Steroids can affect blood sugar levels. If you have diabetes, you should check your blood sugars closely in the first 24 hours after your procedure.   Risks of steroid joint injection:  You may get an infection in your joint. The injection may also cause more pain during the first 24 to 36 hours. You may need more than one injection to feel pain relief. The skin near the injection site may be damaged and become discolored or indented. This can happen if the steroid is placed too close to your skin. A  tendon near the injection site may rupture or a nerve can be damaged.  Contact your healthcare provider if:   You have fever or chills.     You have redness or swelling in the injection site.     You have more pain than usual in your joint for more than 72 hours.     You have questions or concerns about your condition or care.    Medicines:   Pain medicine  may be given. Ask how to take this medicine safely.     Take your medicine as directed.  Contact your healthcare provider if you think your medicine is not helping or if you have side effects. Tell your provider if you are allergic to any medicine. Keep a list of the medicines, vitamins, and herbs you take. Include the amounts, and when and why you take them. Bring the list or the pill bottles to follow-up visits. Carry your medicine list with you in case of an emergency.    Self-care:   Leave the bandage on for 8 to 12 hours.  Care for your wound as directed.    Rest the area  as directed. You may need to decrease weight on certain joints, such as the knee, for a period of time. Ask when you can return to your daily activities.     Elevate  your limb where the steroid injection was given. Elevate the limb above the level of your heart as often as you can. This will help decrease swelling and pain. Prop your limb on pillows or blankets to keep it elevated comfortably.         Apply ice  on your joint for 15 to 20 minutes every hour or as directed. Use an ice pack, or put crushed ice in a plastic bag. Cover it with a towel. Ice helps prevent tissue damage and decreases swelling and pain.    Follow up with your doctor as directed:  Write down your questions so you remember to ask them during your visits.  © Copyright [employer] 2023 Information is for End User's use only and may not be sold, redistributed or otherwise used for commercial purposes.  The above information is an  only. It is not intended as medical advice for individual conditions or  treatments. Talk to your doctor, nurse or pharmacist before following any medical regimen to see if it is safe and effective for you.      Hip Bursitis   WHAT YOU NEED TO KNOW:   Hip bursitis is inflammation of the bursa in your hip. The bursa is a fluid-filled sac that acts as a cushion between a bone and a tendon. A tendon is a cord of strong tissue that connects muscles to bones.       DISCHARGE INSTRUCTIONS:   Call your doctor if:   Your pain and swelling increase.    Your symptoms do not improve with treatment.    You have a fever.    You have questions or concerns about your condition or care.    Medicines:  You may need any of the following:  NSAIDs , such as ibuprofen, help decrease swelling, pain, and fever. NSAIDs can cause stomach bleeding or kidney problems in certain people. If you take blood thinner medicine, always ask your healthcare provider if NSAIDs are safe for you. Always read the medicine label and follow directions.    Aspirin  helps relieve pain and swelling. Take aspirin exactly as directed by your healthcare provider.    Antibiotics  help fight an infection caused by bacteria..    Steroid  pills may be given for a short time to relieve pain and swelling.    Take your medicine as directed.  Contact your healthcare provider if you think your medicine is not helping or if you have side effects. Tell your provider if you are allergic to any medicine. Keep a list of the medicines, vitamins, and herbs you take. Include the amounts, and when and why you take them. Bring the list or the pill bottles to follow-up visits. Carry your medicine list with you in case of an emergency.    Manage hip bursitis:   Rest your hip as much as possible to decrease pain and swelling.  Slowly start to do more each day. Return to your daily activities as directed. You may be able to use a cane or other device to take pressure off the hip as you walk.    Apply ice to help decrease swelling and pain.  Use an ice pack,  or put crushed ice in a plastic bag. Cover the bag with a towel before you place it on your elbow. Apply ice for 15 to 20 minutes, 3 to 4 times each day, as directed.    Do not lie on your injured hip.  You may be more comfortable if you sleep on your stomach or back.    Go to physical therapy, if directed.  A physical therapist teaches you exercises to help improve movement and strength, and to decrease pain. If you play sports, the therapist can show you ways to run or jump that will help prevent hip bursitis.    Prevent hip bursitis:   Do not overuse your hips.  Shorten the time you spend running, climbing stairs, or riding a bike. Take breaks as you do these activities. Try not to do the same activities each day. For example, run every other day or every 3 days instead of daily.    Stretch, warm up, and cool down.  Always stretch and do warm-up and cool-down exercises before and after you exercise. This will help loosen your muscles and decrease stress on your hip. Rest between workouts.         Wear proper shoes.  Wear shoes that fit properly and support your feet. You may need to wear shoe inserts called orthotics. Orthotics help position your foot correctly as you walk or exercise.    Maintain a healthy weight.  Ask your healthcare provider what a healthy weight is for you. Ask him or her to help you create a weight loss plan if you are overweight.    Keep pressure off your hips.  Do not stand or sit for long periods of time. Sit on padded surfaces, such as a cushion or pad, whenever possible. Do not sit with your legs crossed. Bend your knees when you  objects from the ground.       Follow up with your doctor as directed:  Write down your questions so you remember to ask them during your visits.  © Copyright [employer] 2023 Information is for End User's use only and may not be sold, redistributed or otherwise used for commercial purposes.  The above information is an  only. It is not  intended as medical advice for individual conditions or treatments. Talk to your doctor, nurse or pharmacist before following any medical regimen to see if it is safe and effective for you.

## 2024-04-16 NOTE — PROGRESS NOTES
Assessment/Plan:    Diagnoses and all orders for this visit:    Chronic left-sided low back pain without sciatica  -     MRI lumbar spine wo contrast; Future  -     Ambulatory referral to Spine & Pain Management; Future    Lumbar degenerative disc disease  -     MRI lumbar spine wo contrast; Future  -     Ambulatory referral to Spine & Pain Management; Future    Facet arthropathy, lumbar  -     MRI lumbar spine wo contrast; Future  -     Ambulatory referral to Spine & Pain Management; Future    Pain in left hip  -     MRI lumbar spine wo contrast; Future  -     Ambulatory referral to Spine & Pain Management; Future  -     Large joint arthrocentesis: L greater trochanteric bursa    Trochanteric bursitis of left hip  -     Large joint arthrocentesis: L greater trochanteric bursa    Elbow strain, right, initial encounter    Left hip GT CSI provided today  Referred for MRI L spine and to Pain Management for chronic left back pain    Return if symptoms worsen or fail to improve.      Subjective:   Patient ID: Parker Hinojosa is a 79 y.o. male.    Patient returns s/p Medrol pack with significant benefit however pain symptoms returned.  He attributes some of his symptoms to having to care for his wife who had Parkinsons dz.    Started PT.  Notes localized swelling/lump of the volar proximal forearm from playing pickleball     Initial note:  NP presents for worsening left hip pain worse in morning now affecting his pickle ball play.  He does have a history of chronic lower back issues treating with a chiropractor who mentions that he may have piriformis syndrome.  Takes Tylenol, and IBU with meanls occasionally PRN.  He did have x-rays of the hip and lumbar spine in 2023.  States he was previously seen by rheumatology and provided GT CSI 6/2023 for left Bursitis with mild relief          Review of Systems    The following portions of the patient's chart were reviewed and updated as appropriate:   Allergy:  No Known  "Allergies    Medications:    Current Outpatient Medications:     ascorbic acid (VITAMIN C) 500 mg tablet, Take 500 mg by mouth daily, Disp: , Rfl:     Cholecalciferol (VITAMIN D3) 93914 units TABS, Take 2,000 Units by mouth , Disp: , Rfl:     hydrochlorothiazide (HYDRODIURIL) 25 mg tablet, TAKE 1/2 TABLET BY MOUTH EVERY DAY, Disp: 45 tablet, Rfl: 1    hydrocortisone valerate (WEST-RITA) 0.2 % ointment, Apply topically 2 (two) times a day, Disp: 45 g, Rfl: 0    methylPREDNISolone 4 MG tablet therapy pack, Use as directed on package, Disp: 1 each, Rfl: 0    metoprolol tartrate (LOPRESSOR) 50 mg tablet, TAKE 1 TABLET BY MOUTH TWICE A DAY, Disp: 180 tablet, Rfl: 1    RESTASIS 0.05 % ophthalmic emulsion, , Disp: , Rfl:     Patient Active Problem List   Diagnosis    Hyperlipidemia    Hypertension    Allergic rhinitis    Elevated prostate specific antigen (PSA)    Multiple thyroid nodules    Primary osteoarthritis of first carpometacarpal joint of left hand    Osteoarthritis of right acromioclavicular joint    Hyperglycemia    Sebaceous cyst    Insufficiency of tear film of both eyes    Meibomian gland dysfunction of right eye    Posterior vitreous detachment    Edema of left foot    Bursitis    Eczema    CKD (chronic kidney disease)    Piriformis syndrome of left side       Objective:  /76   Pulse 56   Ht 5' 8\" (1.727 m)   Wt 73 kg (161 lb)   BMI 24.48 kg/m²     Left Hip Exam     Other   Erythema: absent    Comments:  Ttp posterior aspect of GT      Right Elbow Exam     Range of Motion   The patient has normal right elbow ROM.    Comments:  Negative hook test with no evidence of distal biceps tendon tear  Localized soft tissue swelling/mass over the radial proximal forearm possible lipoma.  This mass is nontender to palpation patient's pain is deeper to this.  Pain is reproduced with resisted elbow flexion with hand in pronation            Physical Exam      Neurologic Exam    Large joint arthrocentesis: L " "greater trochanteric bursa  Universal Protocol:  Consent: Verbal consent obtained.  Risks and benefits: risks, benefits and alternatives were discussed  Consent given by: patient  Time out: Immediately prior to procedure a \"time out\" was called to verify the correct patient, procedure, equipment, support staff and site/side marked as required.  Timeout called at: 4/16/2024 3:47 PM.  Patient understanding: patient states understanding of the procedure being performed  Test results: test results available and properly labeled  Site marked: the operative site was marked  Patient identity confirmed: verbally with patient  Supporting Documentation  Indications: pain   Procedure Details  Location: hip - L greater trochanteric bursa  Preparation: Patient was prepped and draped in the usual sterile fashion  Needle size: 22 G  Ultrasound guidance: no  Approach: lateral  Medications administered: 4 mL lidocaine 1 %; 1 mL methylPREDNISolone acetate 40 mg/mL    Patient tolerance: patient tolerated the procedure well with no immediate complications  Dressing:  Sterile dressing applied    No erythema of knee(s)          I have personally reviewed the written report of the pertinent studies.             Past Medical History:   Diagnosis Date    Closed fracture of one rib     Last Assessed:  10/20/13    Hyperlipidemia     Hypertension     Left leg numbness 12/17/2019    Onychomycosis of toenail 9/2/2014       Past Surgical History:   Procedure Laterality Date    CRYOTHERAPY Bilateral 05/31/2022    posterior nasal nerve - office procedure - Dr. Britt - [facility]    TONSILLECTOMY         Social History     Socioeconomic History    Marital status: /Civil Union     Spouse name: Not on file    Number of children: Not on file    Years of education: Not on file    Highest education level: Not on file   Occupational History    Occupation: RETIRED   Tobacco Use    Smoking status: Never    Smokeless tobacco: Never   Vaping Use    " Vaping status: Never Used   Substance and Sexual Activity    Alcohol use: No     Comment: As per [employer]:  Social drinker    Drug use: No    Sexual activity: Not on file   Other Topics Concern    Not on file   Social History Narrative    CONSUMES 2 CUPS OF COFFEE PER DAY    Activities:  Skiing, tennis     Social Determinants of Health     Financial Resource Strain: Not on file   Food Insecurity: Not on file   Transportation Needs: Not on file   Physical Activity: Not on file   Stress: Not on file   Social Connections: Not on file   Intimate Partner Violence: Not on file   Housing Stability: Not on file       Family History   Problem Relation Age of Onset    Stroke Mother     Alcohol abuse Father     Depression Brother     Suicidality Brother

## 2024-04-17 ENCOUNTER — OFFICE VISIT (OUTPATIENT)
Dept: PHYSICAL THERAPY | Facility: MEDICAL CENTER | Age: 79
End: 2024-04-17
Payer: MEDICARE

## 2024-04-17 DIAGNOSIS — M51.36 LUMBAR DEGENERATIVE DISC DISEASE: Primary | ICD-10-CM

## 2024-04-17 DIAGNOSIS — M25.552 PAIN IN LEFT HIP: ICD-10-CM

## 2024-04-17 DIAGNOSIS — G89.29 CHRONIC LEFT-SIDED LOW BACK PAIN WITH LEFT-SIDED SCIATICA: ICD-10-CM

## 2024-04-17 DIAGNOSIS — M47.816 FACET ARTHROPATHY, LUMBAR: ICD-10-CM

## 2024-04-17 DIAGNOSIS — M54.42 CHRONIC LEFT-SIDED LOW BACK PAIN WITH LEFT-SIDED SCIATICA: ICD-10-CM

## 2024-04-17 PROCEDURE — 97140 MANUAL THERAPY 1/> REGIONS: CPT | Performed by: PHYSICAL THERAPIST

## 2024-04-17 PROCEDURE — 97110 THERAPEUTIC EXERCISES: CPT | Performed by: PHYSICAL THERAPIST

## 2024-04-17 RX ORDER — METHYLPREDNISOLONE ACETATE 40 MG/ML
1 INJECTION, SUSPENSION INTRA-ARTICULAR; INTRALESIONAL; INTRAMUSCULAR; SOFT TISSUE
Status: COMPLETED | OUTPATIENT
Start: 2024-04-16 | End: 2024-04-16

## 2024-04-17 RX ORDER — LIDOCAINE HYDROCHLORIDE 10 MG/ML
4 INJECTION, SOLUTION INFILTRATION; PERINEURAL
Status: COMPLETED | OUTPATIENT
Start: 2024-04-16 | End: 2024-04-16

## 2024-04-17 NOTE — PROGRESS NOTES
Daily Note     Today's date: 2024  Patient name: Parker Hinojosa  : 1945  MRN: [id number]  Referring provider: Enrrique Marin MD  Dx:   Encounter Diagnosis     ICD-10-CM    1. Lumbar degenerative disc disease  M51.36       2. Facet arthropathy, lumbar  M47.816       3. Chronic left-sided low back pain with left-sided sciatica  M54.42     G89.29       4. Pain in left hip  M25.552                      Subjective:  Patient states that he had a CSI in his hip yesterday and he is feeling a little better.      Objective: See treatment diary below.      Assessment:  Patient presents after L greater trochanteric bursa CSI c/ relief.  Performed gentle L hip PROM and stretching today, along c/ TA contractions.  Patient demonstrates good tolerance to tx.  Will reintroduce strengthening nv.  Tolerated treatment well. Patient would benefit from continued PT.      Plan: Continue per plan of care.      Precautions: HTN      Manuals 3/21 3/29 4/3 4/5 4/10 4/12 4/17      UPAs L4-S1             L hip mobs  AZ           R sidelying lumbar mob  Grade II AZ AZ Grade II AZ Grade II AZ Grade II AZ Grade II       STM/MFR L hip  AZ AZ AZ AZ AZ       L hip gentle PROM       AZ      Neuro Re-Ed             TA isometrics     2x10 5s 2x10 5s 3x10 5s                                                                                    Ther Ex             Hip add     2x10 5s 2x10 5s       Clams     2x10 5s 3x10 5s       Hip abd      2x10 5s green       Hamstring mobility sup str 10x2s 10x2s 10x2s 10x2s 10x2s 10x2s 10x2s      Sciatic n glide sup 10x 10x 10x 10x 10x 10x 10x      SKTC 20x5s 20x5s 20x5s 20x5s  20x5s 20x5s 20x5s      LTR 20x5s 20x5s 20x5s 20x5s 20x5s 20x5s 20x5s      Seated Piriformis str   3x10s 3x10s 3x10s        Sup piriformis str    3x30s 3x30s 3x30s                    Ther Activity                                       Gait Training                                       Modalities             MH  10' sup 10' sup 10' sup  dec        ICE       10'

## 2024-04-24 ENCOUNTER — OFFICE VISIT (OUTPATIENT)
Dept: PHYSICAL THERAPY | Facility: MEDICAL CENTER | Age: 79
End: 2024-04-24
Payer: MEDICARE

## 2024-04-24 DIAGNOSIS — M54.42 CHRONIC LEFT-SIDED LOW BACK PAIN WITH LEFT-SIDED SCIATICA: ICD-10-CM

## 2024-04-24 DIAGNOSIS — M47.816 FACET ARTHROPATHY, LUMBAR: ICD-10-CM

## 2024-04-24 DIAGNOSIS — G89.29 CHRONIC LEFT-SIDED LOW BACK PAIN WITH LEFT-SIDED SCIATICA: ICD-10-CM

## 2024-04-24 DIAGNOSIS — M51.36 LUMBAR DEGENERATIVE DISC DISEASE: Primary | ICD-10-CM

## 2024-04-24 DIAGNOSIS — M25.552 PAIN IN LEFT HIP: ICD-10-CM

## 2024-04-24 PROCEDURE — 97110 THERAPEUTIC EXERCISES: CPT | Performed by: PHYSICAL THERAPIST

## 2024-04-24 PROCEDURE — 97112 NEUROMUSCULAR REEDUCATION: CPT | Performed by: PHYSICAL THERAPIST

## 2024-04-24 PROCEDURE — 97140 MANUAL THERAPY 1/> REGIONS: CPT | Performed by: PHYSICAL THERAPIST

## 2024-04-24 NOTE — PROGRESS NOTES
Progress Note     Today's date: 2024  Patient name: Parker Hinojosa  : 1945  MRN: [id number]  Referring provider: Enrrique Marin MD  Dx:   Encounter Diagnosis     ICD-10-CM    1. Lumbar degenerative disc disease  M51.36       2. Facet arthropathy, lumbar  M47.816       3. Chronic left-sided low back pain with left-sided sciatica  M54.42     G89.29       4. Pain in left hip  M25.552                      Subjective: Patient states that he is sore, but may have overdone it.      Objective: See treatment diary below.      Assessment:  Patient presents c/ decreased L hip ER c/ PROM.  He tolerated hip mobs on the left c/ belt to increase ER.  Tolerated treatment well. Patient would benefit from continued PT.    Goals  Impairment Goals  - Pt I with initial HEP in 1-2 visits- achieved  - Improve ROM equal to contralateral side in 4-6 weeks- in progress  - Increase strength to 5/5 in all affected areas in 4-6 weeks- in progress    Functional Goals  - Increase Functional Status Measure to: goal status in 6-8 weeks- in progress  - Patient will be independent with comprehensive HEP in 6-8 weeks- in progress  - Patient will be able to perform daily activities with less pain at time of discharge- in progress  - Patient will be able to ride his bike and play pickleball with reduced pain at time of discharge- in progress      Plan: Continue per plan of care.      Precautions: HTN      Manuals 3/21 3/29 4/3 4/5 4/10 4/12 4/17 4/24     UPAs L4-S1             L hip mobs  AZ      AZ belt     R sidelying lumbar mob  Grade II AZ AZ Grade II AZ Grade II AZ Grade II AZ Grade II  AZ Grade II     STM/MFR L hip  AZ AZ AZ AZ AZ  AZ     L hip gentle PROM       AZ      Neuro Re-Ed             TA isometrics     2x10 5s 2x10 5s 3x10 5s 3x10 5s                                                                                   Ther Ex             Hip add     2x10 5s 2x10 5s  3x10 5s     Clams     2x10 5s 3x10 5s  3x10 5s     Hip abd       2x10 5s green  3x10 5s green     Hamstring mobility sup str 10x2s 10x2s 10x2s 10x2s 10x2s 10x2s 10x2s 10x2s     Sciatic n glide sup 10x 10x 10x 10x 10x 10x 10x 10x     SKTC 20x5s 20x5s 20x5s 20x5s  20x5s 20x5s 20x5s 20x5s     LTR 20x5s 20x5s 20x5s 20x5s 20x5s 20x5s 20x5s 20x5s     Seated Piriformis str   3x10s 3x10s 3x10s        Sup piriformis str    3x30s 3x30s 3x30s  3x30s                  Ther Activity                                       Gait Training                                       Modalities             MH  10' sup 10' sup 10' sup dec   10'     ICE       10'

## 2024-04-26 ENCOUNTER — OFFICE VISIT (OUTPATIENT)
Dept: PHYSICAL THERAPY | Facility: MEDICAL CENTER | Age: 79
End: 2024-04-26
Payer: MEDICARE

## 2024-04-26 DIAGNOSIS — G89.29 CHRONIC LEFT-SIDED LOW BACK PAIN WITH LEFT-SIDED SCIATICA: ICD-10-CM

## 2024-04-26 DIAGNOSIS — M51.36 LUMBAR DEGENERATIVE DISC DISEASE: Primary | ICD-10-CM

## 2024-04-26 DIAGNOSIS — M54.42 CHRONIC LEFT-SIDED LOW BACK PAIN WITH LEFT-SIDED SCIATICA: ICD-10-CM

## 2024-04-26 DIAGNOSIS — M47.816 FACET ARTHROPATHY, LUMBAR: ICD-10-CM

## 2024-04-26 DIAGNOSIS — M25.552 PAIN IN LEFT HIP: ICD-10-CM

## 2024-04-26 PROCEDURE — 97110 THERAPEUTIC EXERCISES: CPT | Performed by: PHYSICAL THERAPIST

## 2024-04-26 PROCEDURE — 97112 NEUROMUSCULAR REEDUCATION: CPT | Performed by: PHYSICAL THERAPIST

## 2024-04-26 PROCEDURE — 97140 MANUAL THERAPY 1/> REGIONS: CPT | Performed by: PHYSICAL THERAPIST

## 2024-04-26 NOTE — PROGRESS NOTES
kDaily Note     Today's date: 2024  Patient name: Parker Hinojosa  : 1945  MRN: [id number]  Referring provider: Enrrique Marin MD  Dx:   Encounter Diagnosis     ICD-10-CM    1. Lumbar degenerative disc disease  M51.36       2. Facet arthropathy, lumbar  M47.816       3. Chronic left-sided low back pain with left-sided sciatica  M54.42     G89.29       4. Pain in left hip  M25.552                      Subjective: Patient states that he felt good after lv.      Objective: See treatment diary below.      Assessment:  Patient demonstrates good tolerance to progressions c/ decreased pain.  Tolerated treatment well. Patient would benefit from continued PT      Plan: Continue per plan of care.      Precautions: HTN      Manuals 3/21 3/29 4/3 4/5 4/10 4/12 4/17 4/24 4/26    UPAs L4-S1             L hip mobs  AZ      AZ belt AZ    R sidelying lumbar mob  Grade II AZ AZ Grade II AZ Grade II AZ Grade II AZ Grade II  AZ Grade II AZ    STM/MFR L hip  AZ AZ AZ AZ AZ  AZ AZ    L hip gentle PROM       AZ      Neuro Re-Ed             TA isometrics     2x10 5s 2x10 5s 3x10 5s 3x10 5s 3x10 5s                                                                                  Ther Ex             Hip add     2x10 5s 2x10 5s  3x10 5s 3x10 5s    Clams     2x10 5s 3x10 5s  3x10 5s 3x10 5s    Hip abd      2x10 5s green  3x10 5s green 3x10 5s green    Hamstring mobility sup str 10x2s 10x2s 10x2s 10x2s 10x2s 10x2s 10x2s 10x2s 10x2s    Sciatic n glide sup 10x 10x 10x 10x 10x 10x 10x 10x 10s    SKTC 20x5s 20x5s 20x5s 20x5s  20x5s 20x5s 20x5s 20x5s 20x5s    LTR 20x5s 20x5s 20x5s 20x5s 20x5s 20x5s 20x5s 20x5s 20x5s    Seated Piriformis str   3x10s 3x10s 3x10s        Sup piriformis str    3x30s 3x30s 3x30s  3x30s 3x30s                 Ther Activity                                       Gait Training                                       Modalities             MH  10' sup 10' sup 10' sup dec   10 10    ICE       10'

## 2024-04-29 ENCOUNTER — APPOINTMENT (OUTPATIENT)
Dept: PHYSICAL THERAPY | Facility: MEDICAL CENTER | Age: 79
End: 2024-04-29
Payer: MEDICARE

## 2024-05-01 ENCOUNTER — PATIENT OUTREACH (OUTPATIENT)
Dept: FAMILY MEDICINE CLINIC | Facility: CLINIC | Age: 79
End: 2024-05-01

## 2024-05-01 ENCOUNTER — OFFICE VISIT (OUTPATIENT)
Dept: FAMILY MEDICINE CLINIC | Facility: CLINIC | Age: 79
End: 2024-05-01
Payer: MEDICARE

## 2024-05-01 VITALS
BODY MASS INDEX: 24.25 KG/M2 | WEIGHT: 160 LBS | RESPIRATION RATE: 16 BRPM | SYSTOLIC BLOOD PRESSURE: 132 MMHG | HEIGHT: 68 IN | DIASTOLIC BLOOD PRESSURE: 76 MMHG

## 2024-05-01 DIAGNOSIS — Z00.00 ENCOUNTER FOR ANNUAL WELLNESS VISIT (AWV) IN MEDICARE PATIENT: ICD-10-CM

## 2024-05-01 DIAGNOSIS — Z63.79 STRESS DUE TO ILLNESS OF FAMILY MEMBER: ICD-10-CM

## 2024-05-01 DIAGNOSIS — N18.31 STAGE 3A CHRONIC KIDNEY DISEASE (HCC): ICD-10-CM

## 2024-05-01 DIAGNOSIS — M54.50 ACUTE LOW BACK PAIN WITHOUT SCIATICA, UNSPECIFIED BACK PAIN LATERALITY: ICD-10-CM

## 2024-05-01 DIAGNOSIS — Z23 NEED FOR COVID-19 VACCINE: ICD-10-CM

## 2024-05-01 DIAGNOSIS — M70.62 TROCHANTERIC BURSITIS OF LEFT HIP: Primary | ICD-10-CM

## 2024-05-01 DIAGNOSIS — Z29.11 NEED FOR RSV VACCINATION: ICD-10-CM

## 2024-05-01 DIAGNOSIS — Z79.899 CONTROLLED SUBSTANCE AGREEMENT SIGNED: ICD-10-CM

## 2024-05-01 DIAGNOSIS — I10 PRIMARY HYPERTENSION: ICD-10-CM

## 2024-05-01 PROBLEM — M54.9 BACK PAIN: Status: ACTIVE | Noted: 2024-05-01

## 2024-05-01 PROCEDURE — G0439 PPPS, SUBSEQ VISIT: HCPCS | Performed by: FAMILY MEDICINE

## 2024-05-01 PROCEDURE — 1123F ACP DISCUSS/DSCN MKR DOCD: CPT | Performed by: FAMILY MEDICINE

## 2024-05-01 PROCEDURE — 99214 OFFICE O/P EST MOD 30 MIN: CPT | Performed by: FAMILY MEDICINE

## 2024-05-01 RX ORDER — CLONAZEPAM 0.5 MG/1
0.25 TABLET ORAL 2 TIMES DAILY PRN
Qty: 30 TABLET | Refills: 0 | Status: SHIPPED | OUTPATIENT
Start: 2024-05-01

## 2024-05-01 NOTE — PROGRESS NOTES
Assessment and Plan:     1. Trochanteric bursitis of left hip  Assessment & Plan:  Per back pain, MRI and possible pain management.      2. Acute low back pain without sciatica, unspecified back pain laterality  Assessment & Plan:  Plan is MRI and PT per ortho.  Considering pain management.      3. Primary hypertension  Assessment & Plan:  Patient BP doing well. No changes.        4. Stress due to illness of family member  Assessment & Plan:  Severe issues with stress and physical changes.  OK to have small amount Benzodiazepine at home.  Can use clonazepam.  Understands it is a controlled substance.        Orders:  -     clonazePAM (KlonoPIN) 0.5 mg tablet; Take 0.5 tablets (0.25 mg total) by mouth 2 (two) times a day as needed for anxiety  -     Ambulatory Referral to Social Work Care Management Program; Future    5. Encounter for annual wellness visit (AWV) in Medicare patient  Comments:  Reviewed HM, advanced directives, immunizations.    6. Need for COVID-19 vaccine  Comments:  Recommend COVID updated 2023 booster.    7. Need for RSV vaccination  Comments:  consider RSV vaccine at local pharmacy    8. Stage 3a chronic kidney disease (HCC)  Assessment & Plan:  Lab Results   Component Value Date    EGFR 51 11/10/2023    EGFR 58 10/11/2022    EGFR 57 09/13/2022    CREATININE 1.31 (H) 11/10/2023    CREATININE 1.17 10/11/2022    CREATININE 1.20 09/13/2022   GFR previously slightly low.  Was 51.  Would encourage hydration, blood pressure control.    Precautions with regard to anti-inflammatories, such as Advil, Aleve.  He is currently using Advil 200 mg twice a day, which is a minimal dose.  If he needs to increase the amount, would be concerned about kidney function.      9. Controlled substance agreement signed  Assessment & Plan:  Patient signed controlled substance agreement for the practice.  Clonazepam.    Orders:  -     clonazePAM (KlonoPIN) 0.5 mg tablet; Take 0.5 tablets (0.25 mg total) by mouth 2 (two)  times a day as needed for anxiety           Preventive health issues were discussed with patient, and age appropriate screening tests were ordered as noted in patient's After Visit Summary.  Personalized health advice and appropriate referrals for health education or preventive services given if needed, as noted in patient's After Visit Summary.  Chief Complaint   Patient presents with   • Medicare Wellness Visit        History of Present Illness:     Patient presents for a Medicare Wellness Visit    Patient is here for multiple issues.    He is having increasing stress at home.  Wife has parkinson's, and he is primary caregiver.    Since caring for his wife, he has noted some increase in back pain with this.  Noting increased pain in the left hip/back.  Went to ortho.   PT and MRI were recommended.  He did have prednisone and troch injection.  MRI is tomorrow.  He is trying to change how he physically cares for his wife.    His wife is using clonazepam for anxiety.  He tried some before, and noted some improvement in stress and sleep.           Patient Care Team:  Jose L Higuera MD as PCP - General  Jose L Higuera MD     Review of Systems:     Review of Systems   Constitutional: Negative.    HENT: Negative.     Respiratory: Negative.     Cardiovascular: Negative.    Gastrointestinal: Negative.    Musculoskeletal:  Positive for back pain.        Problem List:     Patient Active Problem List   Diagnosis   • Hyperlipidemia   • Hypertension   • Allergic rhinitis   • Elevated prostate specific antigen (PSA)   • Multiple thyroid nodules   • Primary osteoarthritis of first carpometacarpal joint of left hand   • Osteoarthritis of right acromioclavicular joint   • Hyperglycemia   • Sebaceous cyst   • Insufficiency of tear film of both eyes   • Meibomian gland dysfunction of right eye   • Posterior vitreous detachment   • Edema of left foot   • Bursitis   • Eczema   • CKD (chronic kidney disease)   •  Piriformis syndrome of left side   • Back pain   • Stress due to illness of family member   • Controlled substance agreement signed      Past Medical and Surgical History:     Past Medical History:   Diagnosis Date   • Closed fracture of one rib     Last Assessed:  10/20/13   • Hyperlipidemia    • Hypertension    • Left leg numbness 12/17/2019   • Onychomycosis of toenail 9/2/2014     Past Surgical History:   Procedure Laterality Date   • CRYOTHERAPY Bilateral 05/31/2022    posterior nasal nerve - office procedure - Dr. Lorenza Velázquez   • TONSILLECTOMY        Family History:     Family History   Problem Relation Age of Onset   • Stroke Mother    • Alcohol abuse Father    • Depression Brother    • Suicidality Brother       Social History:     Social History     Socioeconomic History   • Marital status: /Civil Union     Spouse name: None   • Number of children: None   • Years of education: None   • Highest education level: None   Occupational History   • Occupation: RETIRED   Tobacco Use   • Smoking status: Never   • Smokeless tobacco: Never   Vaping Use   • Vaping status: Never Used   Substance and Sexual Activity   • Alcohol use: No     Comment: As per [employer]:  Social drinker   • Drug use: No   • Sexual activity: None   Other Topics Concern   • None   Social History Narrative    CONSUMES 2 CUPS OF COFFEE PER DAY    Activities:  Skiing, tennis     Social Determinants of Health     Financial Resource Strain: Low Risk  (5/1/2024)    Overall Financial Resource Strain (CARDIA)    • Difficulty of Paying Living Expenses: Not hard at all   Food Insecurity: No Food Insecurity (5/1/2024)    Hunger Vital Sign    • Worried About Running Out of Food in the Last Year: Never true    • Ran Out of Food in the Last Year: Never true   Transportation Needs: No Transportation Needs (5/1/2024)    PRAPARE - Transportation    • Lack of Transportation (Medical): No    • Lack of Transportation (Non-Medical): No   Physical  Activity: Not on file   Stress: No Stress Concern Present (5/1/2024)    [employer] [place] [employer] - Occupational Stress Questionnaire    • Feeling of Stress : Not at all   Social Connections: Not on file   Intimate Partner Violence: Not At Risk (5/1/2024)    Humiliation, Afraid, Rape, and Kick questionnaire    • Fear of Current or Ex-Partner: No    • Emotionally Abused: No    • Physically Abused: No    • Sexually Abused: No   Housing Stability: Low Risk  (5/1/2024)    Housing Stability Vital Sign    • Unable to Pay for Housing in the Last Year: No    • Number of Places Lived in the Last Year: 0    • Unstable Housing in the Last Year: No      Medications and Allergies:     Current Outpatient Medications   Medication Sig Dispense Refill   • ascorbic acid (VITAMIN C) 500 mg tablet Take 500 mg by mouth daily     • Cholecalciferol (VITAMIN D3) 02541 units TABS Take 2,000 Units by mouth      • clonazePAM (KlonoPIN) 0.5 mg tablet Take 0.5 tablets (0.25 mg total) by mouth 2 (two) times a day as needed for anxiety 30 tablet 0   • hydrochlorothiazide (HYDRODIURIL) 25 mg tablet TAKE 1/2 TABLET BY MOUTH EVERY DAY 45 tablet 1   • hydrocortisone valerate (WEST-RITA) 0.2 % ointment Apply topically 2 (two) times a day 45 g 0   • methylPREDNISolone 4 MG tablet therapy pack Use as directed on package 1 each 0   • metoprolol tartrate (LOPRESSOR) 50 mg tablet TAKE 1 TABLET BY MOUTH TWICE A  tablet 1   • RESTASIS 0.05 % ophthalmic emulsion        No current facility-administered medications for this visit.     No Known Allergies   Immunizations:     Immunization History   Administered Date(s) Administered   • COVID-19 MODERNA VACC 0.5 ML IM 01/21/2021, 02/18/2021, 11/14/2021   • INFLUENZA 10/25/2018, 11/07/2019, 10/15/2020, 11/02/2022   • Influenza Split High Dose Preservative Free IM 10/08/2014, 10/02/2015, 10/26/2016, 11/20/2017, 11/07/2019   • Influenza, high dose seasonal 0.7 mL 11/17/2021, 11/02/2022,  10/18/2023   • Influenza, seasonal, injectable 09/27/2010, 12/07/2012   • Pneumococcal Conjugate 13-Valent 03/12/2018   • Pneumococcal Polysaccharide PPV23 12/01/2009   • Zoster 12/01/2009   • Zoster Vaccine Recombinant 12/15/2019, 09/25/2020   • influenza, trivalent, adjuvanted 10/25/2018      Health Maintenance:     There are no preventive care reminders to display for this patient.      Topic Date Due   • COVID-19 Vaccine (4 - 2023-24 season) 09/01/2023      Medicare Screening Tests and Risk Assessments:     Parker is here for his Subsequent Wellness visit.     Health Risk Assessment:   Patient rates overall health as good. Patient feels that their physical health rating is same. Patient is very satisfied with their life. Eyesight was rated as same. Hearing was rated as same. Patient feels that their emotional and mental health rating is same. Patients states they are never, rarely angry. Patient states they are never, rarely unusually tired/fatigued. Pain experienced in the last 7 days has been some. Patient's pain rating has been 5/10. Patient states that he has experienced no weight loss or gain in last 6 months.     Depression Screening:   PHQ-2 Score: 0      Fall Risk Screening:   In the past year, patient has experienced: no history of falling in past year      Home Safety:  Patient does not have trouble with stairs inside or outside of their home. Patient has working smoke alarms and has working carbon monoxide detector. Home safety hazards include: none.     Nutrition:   Current diet is Regular.     Medications:   Patient is currently taking over-the-counter supplements. OTC medications include: see medication list. Patient is able to manage medications.     Activities of Daily Living (ADLs)/Instrumental Activities of Daily Living (IADLs):   Walk and transfer into and out of bed and chair?: Yes  Dress and groom yourself?: Yes    Bathe or shower yourself?: Yes    Feed yourself? Yes  Do your  laundry/housekeeping?: Yes  Manage your money, pay your bills and track your expenses?: Yes  Make your own meals?: Yes    Do your own shopping?: Yes    Previous Hospitalizations:   Any hospitalizations or ED visits within the last 12 months?: No      Advance Care Planning:   Living will: Yes    Durable POA for healthcare: Yes    Advanced directive: Yes    Advanced directive counseling given: Yes      Cognitive Screening:   Provider or family/friend/caregiver concerned regarding cognition?: No    PREVENTIVE SCREENINGS      Cardiovascular Screening:    General: Screening Not Indicated and History Lipid Disorder      Diabetes Screening:     General: Screening Current      Colorectal Cancer Screening:     General: Risks and Benefits Discussed      Prostate Cancer Screening:    General: Screening Not Indicated      Osteoporosis Screening:    General: Screening Not Indicated      Abdominal Aortic Aneurysm (AAA) Screening:        General: Screening Not Indicated      Lung Cancer Screening:     General: Screening Not Indicated      Hepatitis C Screening:    General: Screening Not Indicated    Screening, Brief Intervention, and Referral to Treatment (SBIRT)    Screening  Typical number of drinks in a day: 0  Typical number of drinks in a week: 0  Interpretation: Low risk drinking behavior.    AUDIT-C Screenin) How often did you have a drink containing alcohol in the past year? never  2) How many drinks did you have on a typical day when you were drinking in the past year? 0  3) How often did you have 6 or more drinks on one occasion in the past year? never    AUDIT-C Score: 0  Interpretation: Score 0-3 (male): Negative screen for alcohol misuse    Single Item Drug Screening:  How often have you used an illegal drug (including marijuana) or a prescription medication for non-medical reasons in the past year? never    Single Item Drug Screen Score: 0  Interpretation: Negative screen for possible drug use disorder    No  "results found.     Physical Exam:     /76 (BP Location: Left arm, Patient Position: Sitting, Cuff Size: Large)   Resp 16   Ht 5' 8\" (1.727 m)   Wt 72.6 kg (160 lb)   BMI 24.33 kg/m²     Physical Exam  Vitals and nursing note reviewed.   Constitutional:       Appearance: Normal appearance.   Neck:      Vascular: No carotid bruit.   Cardiovascular:      Rate and Rhythm: Normal rate and regular rhythm.      Pulses: Normal pulses.           Carotid pulses are 2+ on the right side and 2+ on the left side.     Heart sounds: Normal heart sounds. No murmur heard.     No gallop.   Pulmonary:      Effort: Pulmonary effort is normal. No respiratory distress.      Breath sounds: Normal breath sounds. No stridor. No wheezing, rhonchi or rales.   Chest:      Chest wall: No tenderness.   Neurological:      Mental Status: He is alert.          JoseL Higuera MD  "

## 2024-05-01 NOTE — ASSESSMENT & PLAN NOTE
Severe issues with stress and physical changes.  OK to have small amount Benzodiazepine at home.  Can use clonazepam.  Understands it is a controlled substance.

## 2024-05-01 NOTE — PATIENT INSTRUCTIONS
1. Trochanteric bursitis of left hip  Assessment & Plan:  Per back pain, MRI and possible pain management.      2. Acute low back pain without sciatica, unspecified back pain laterality  Assessment & Plan:  Plan is MRI and PT per ortho.  Considering pain management.      3. Primary hypertension  Assessment & Plan:  Patient BP doing well. No changes.        4. Stress due to illness of family member  Assessment & Plan:  Severe issues with stress and physical changes.  OK to have small amount Benzodiazepine at home.  Can use clonazepam.  Understands it is a controlled substance.        Orders:  -     clonazePAM (KlonoPIN) 0.5 mg tablet; Take 0.5 tablets (0.25 mg total) by mouth 2 (two) times a day as needed for anxiety  -     Ambulatory Referral to Social Work Care Management Program; Future    5. Encounter for annual wellness visit (AWV) in Medicare patient  Comments:  Reviewed HM, advanced directives, immunizations.    6. Need for COVID-19 vaccine  Comments:  Recommend COVID updated 2023 booster.    7. Need for RSV vaccination  Comments:  consider RSV vaccine at local pharmacy    8. Stage 3a chronic kidney disease (HCC)  Assessment & Plan:  Lab Results   Component Value Date    EGFR 51 11/10/2023    EGFR 58 10/11/2022    EGFR 57 09/13/2022    CREATININE 1.31 (H) 11/10/2023    CREATININE 1.17 10/11/2022    CREATININE 1.20 09/13/2022   GFR previously slightly low.  Was 51.  Would encourage hydration, blood pressure control.    Precautions with regard to anti-inflammatories, such as Advil, Aleve.  He is currently using Advil 200 mg twice a day, which is a minimal dose.  If he needs to increase the amount, would be concerned about kidney function.      9. Controlled substance agreement signed  Assessment & Plan:  Patient signed controlled substance agreement for the practice.  Clonazepam.    Orders:  -     clonazePAM (KlonoPIN) 0.5 mg tablet; Take 0.5 tablets (0.25 mg total) by mouth 2 (two) times a day as needed for  anxiety        COVID 19 Instructions    Parker Hinojosa was advised to limit contact with others to essential tasks such as getting food, medications, and medical care.    Proper handwashing reviewed, and Hand sanitzer when washing is not available.    If the patient develops symptoms of COVID 19, the patient should call the office as soon as possible.    It is strongly recommended that Flu Vaccinations be obtained.      Virtual Visits:  AmWell: This works on smart phones (any phone with Internet browsing capability).  You should get a text message when the provider is ready to see you.  Click on the link in the text message, and the call should start.  You will need to type in your name, and allow camera and microphone access.  This is HIPPA compliant, and secure.      If you have not already done so, get immunized to COVID 19.      We are committed to getting you vaccinated as soon as possible and will be closely following CDC and [facility] guidelines as they are released and revised.  Please refer to our COVID-19 vaccine webpage for the most up to date information on the vaccine and our distribution efforts.    This site will also have the most up to date recommendations for COVID booster vaccine.    [email or web address]    Call [phone] ([phone]), option 7    You can also visit https://www.vaccines.gov/ to find vaccines in your area.    OUR LOCATION:    [facility]  [address] [place] [address], [address]  [place], [place], [address] [phone]  Fax: [phone]    Lab services, Rheumatology, and OB/GYN are at this location as well.  Thank you for your inquiry about the RSV vaccine.  As you can see below, the CDC has recommended that you discuss this with your Primary Care provider and decide if the vaccine is right for you.  This discussion can be accomplished at your next office visit.  The vaccine is currently available at your local pharmacy if  you choose to get it prior to your next office visit.     Respiratory syncytial (sin-SIS-edi) virus, or RSV, is a common respiratory virus that usually causes mild, cold-like symptoms. Most people recover in a week or two, but RSV can be serious. Infants and older adults are more likely to develop severe RSV and need hospitalization. Vaccines are available to protect older adults from severe RSV. Monoclonal antibody products are available to protect infants and young children from severe RSV.    CDC Recommendations  Adults 60 years old and over  Adults 60 years of age and older may receive a single dose of RSV vaccine using shared clinical decision-making.    Infants and young children  1 dose of nirsevimab for all infants younger than 8 months born during or entering their first RSV season.  1 dose of nirsevimab for infants and children 8-19 months old who are at increased risk for severe RSV disease and entering their second RSV season.  Note: A different monoclonal antibody, palivizumab, is limited to children under 24 months of age with certain conditions that place them at high risk for severe RSV disease. It must be given once a month during RSV season. Please see AAP guidelines for palivizumab.    Pregnant people  1 dose of maternal RSV vaccine during weeks 32 through 36 of pregnancy, administered immediately before or during RSV season. Abrysvo is the only RSV vaccine recommended during pregnancy.    If you have any questions about RSV or the products above, talk to your healthcare provider.

## 2024-05-01 NOTE — ASSESSMENT & PLAN NOTE
Lab Results   Component Value Date    EGFR 51 11/10/2023    EGFR 58 10/11/2022    EGFR 57 09/13/2022    CREATININE 1.31 (H) 11/10/2023    CREATININE 1.17 10/11/2022    CREATININE 1.20 09/13/2022   GFR previously slightly low.  Was 51.  Would encourage hydration, blood pressure control.    Precautions with regard to anti-inflammatories, such as Advil, Aleve.  He is currently using Advil 200 mg twice a day, which is a minimal dose.  If he needs to increase the amount, would be concerned about kidney function.

## 2024-05-01 NOTE — PROGRESS NOTES
OP CM called to pt and his son answered the phone.  Pt was at the store.  Son states they are not interested in any type of placement and he will have pt call me.  Pt has been taking care of his wife.  Spoke to pt in the past about waiver but pt states they are over income so will discuss pts concerns again once he calls back.

## 2024-05-02 ENCOUNTER — PATIENT OUTREACH (OUTPATIENT)
Dept: FAMILY MEDICINE CLINIC | Facility: CLINIC | Age: 79
End: 2024-05-02

## 2024-05-02 ENCOUNTER — HOSPITAL ENCOUNTER (OUTPATIENT)
Facility: MEDICAL CENTER | Age: 79
Discharge: HOME | End: 2024-05-02
Payer: MEDICARE

## 2024-05-02 ENCOUNTER — OFFICE VISIT (OUTPATIENT)
Dept: PHYSICAL THERAPY | Facility: MEDICAL CENTER | Age: 79
End: 2024-05-02
Payer: MEDICARE

## 2024-05-02 DIAGNOSIS — M54.50 CHRONIC LEFT-SIDED LOW BACK PAIN WITHOUT SCIATICA: ICD-10-CM

## 2024-05-02 DIAGNOSIS — G89.29 CHRONIC LEFT-SIDED LOW BACK PAIN WITHOUT SCIATICA: ICD-10-CM

## 2024-05-02 DIAGNOSIS — G89.29 CHRONIC LEFT-SIDED LOW BACK PAIN WITH LEFT-SIDED SCIATICA: ICD-10-CM

## 2024-05-02 DIAGNOSIS — M25.552 PAIN IN LEFT HIP: ICD-10-CM

## 2024-05-02 DIAGNOSIS — M51.36 LUMBAR DEGENERATIVE DISC DISEASE: Primary | ICD-10-CM

## 2024-05-02 DIAGNOSIS — M47.816 FACET ARTHROPATHY, LUMBAR: ICD-10-CM

## 2024-05-02 DIAGNOSIS — I10 ESSENTIAL HYPERTENSION: ICD-10-CM

## 2024-05-02 DIAGNOSIS — M51.36 LUMBAR DEGENERATIVE DISC DISEASE: ICD-10-CM

## 2024-05-02 DIAGNOSIS — M54.42 CHRONIC LEFT-SIDED LOW BACK PAIN WITH LEFT-SIDED SCIATICA: ICD-10-CM

## 2024-05-02 PROCEDURE — 97140 MANUAL THERAPY 1/> REGIONS: CPT | Performed by: PHYSICAL THERAPIST

## 2024-05-02 PROCEDURE — 97110 THERAPEUTIC EXERCISES: CPT | Performed by: PHYSICAL THERAPIST

## 2024-05-02 PROCEDURE — 72148 MRI LUMBAR SPINE W/O DYE: CPT

## 2024-05-02 PROCEDURE — 97112 NEUROMUSCULAR REEDUCATION: CPT | Performed by: PHYSICAL THERAPIST

## 2024-05-02 NOTE — PROGRESS NOTES
OP CM called to pt and left message.  Per pts son they are not interested in placement.  Pt given OP CM contact info and advised to call with any needs.

## 2024-05-02 NOTE — PROGRESS NOTES
Daily Note     Today's date: 2024  Patient name: Parker Hinojosa  : 1945  MRN: [id number]  Referring provider: Enrrique Marin MD  Dx:   Encounter Diagnosis     ICD-10-CM    1. Lumbar degenerative disc disease  M51.36       2. Facet arthropathy, lumbar  M47.816       3. Chronic left-sided low back pain with left-sided sciatica  M54.42     G89.29       4. Pain in left hip  M25.552                      Subjective:  Patient states that he is doing okay.  He had increased pain in the left hip after sitting for a while today at lunch.      Objective: See treatment diary below.      Assessment: Tolerated treatment well. Patient would benefit from continued PT      Plan: Continue per plan of care.      Precautions: HTN      Manuals 3/21 3/29 4/3 4/5 4/10 4/12 4/17 4/24 4/26 5/2   UPAs L4-S1             L hip mobs  AZ      AZ belt AZ AZ   R sidelying lumbar mob  Grade II AZ AZ Grade II AZ Grade II AZ Grade II AZ Grade II  AZ Grade II AZ AZ   STM/MFR L hip  AZ AZ AZ AZ AZ  AZ AZ AZ   L hip gentle PROM       AZ      Neuro Re-Ed             TA isometrics     2x10 5s 2x10 5s 3x10 5s 3x10 5s 3x10 5s 3x10 5s                                                                                 Ther Ex             Hip add     2x10 5s 2x10 5s  3x10 5s 3x10 5s    Clams     2x10 5s 3x10 5s  3x10 5s 3x10 5s 3x10 5s   Hip abd      2x10 5s green  3x10 5s green 3x10 5s green 3x10 5s blue   Hamstring mobility sup str 10x2s 10x2s 10x2s 10x2s 10x2s 10x2s 10x2s 10x2s 10x2s 10x2s   Sciatic n glide sup 10x 10x 10x 10x 10x 10x 10x 10x 10x 10x   SKTC 20x5s 20x5s 20x5s 20x5s  20x5s 20x5s 20x5s 20x5s 20x5s 20x5s   LTR 20x5s 20x5s 20x5s 20x5s 20x5s 20x5s 20x5s 20x5s 20x5s 20x5s   Seated Piriformis str   3x10s 3x10s 3x10s        Sup piriformis str    3x30s 3x30s 3x30s  3x30s 3x30s 3x30s                Ther Activity                                       Gait Training                                       Modalities             MH  10' sup 10' sup  10' sup dec   10' 10' 10'   ICE       10'

## 2024-05-03 ENCOUNTER — PATIENT OUTREACH (OUTPATIENT)
Dept: FAMILY MEDICINE CLINIC | Facility: CLINIC | Age: 79
End: 2024-05-03

## 2024-05-03 RX ORDER — HYDROCHLOROTHIAZIDE 25 MG/1
12.5 TABLET ORAL DAILY
Qty: 45 TABLET | Refills: 1 | Status: SHIPPED | OUTPATIENT
Start: 2024-05-03

## 2024-05-03 NOTE — PROGRESS NOTES
OP CM rcvd call back from pt.  Pt states that he is thinking his wife will eventually need to go to a home.  Pt also states tht he has back issues so taking care of his wife is difficult.  Pts son is also now living with them to assist.  Pt is over income for home health waiver.  Pt states he is talking to people in his [employer] and he may have someone who will help at home.  Pt also states that he called [employer] private pay but he does not want to commit to a weekly set of hours.  Hsb states he also has a five year long term care plan on pt but has not called about his benefits yet but states he will.  Pt is also planning on visiting [facility] nursing [facility]. Pt feels very overwhelmed and emotional support provided.

## 2024-05-08 ENCOUNTER — OFFICE VISIT (OUTPATIENT)
Dept: PHYSICAL THERAPY | Facility: MEDICAL CENTER | Age: 79
End: 2024-05-08
Payer: MEDICARE

## 2024-05-08 DIAGNOSIS — M54.42 CHRONIC LEFT-SIDED LOW BACK PAIN WITH LEFT-SIDED SCIATICA: ICD-10-CM

## 2024-05-08 DIAGNOSIS — G89.29 CHRONIC LEFT-SIDED LOW BACK PAIN WITH LEFT-SIDED SCIATICA: ICD-10-CM

## 2024-05-08 DIAGNOSIS — M47.816 FACET ARTHROPATHY, LUMBAR: ICD-10-CM

## 2024-05-08 DIAGNOSIS — M25.552 PAIN IN LEFT HIP: ICD-10-CM

## 2024-05-08 DIAGNOSIS — M51.36 LUMBAR DEGENERATIVE DISC DISEASE: Primary | ICD-10-CM

## 2024-05-08 PROCEDURE — 97140 MANUAL THERAPY 1/> REGIONS: CPT | Performed by: PHYSICAL THERAPIST

## 2024-05-08 PROCEDURE — 97110 THERAPEUTIC EXERCISES: CPT | Performed by: PHYSICAL THERAPIST

## 2024-05-08 PROCEDURE — 97112 NEUROMUSCULAR REEDUCATION: CPT | Performed by: PHYSICAL THERAPIST

## 2024-05-08 NOTE — PROGRESS NOTES
Daily Note     Today's date: 2024  Patient name: Parker Hinojosa  : 1945  MRN: [id number]  Referring provider: Enrrique Marin MD  Dx:   Encounter Diagnosis     ICD-10-CM    1. Lumbar degenerative disc disease  M51.36       2. Facet arthropathy, lumbar  M47.816       3. Chronic left-sided low back pain with left-sided sciatica  M54.42     G89.29       4. Pain in left hip  M25.552                      Subjective: Patient states that he is doing okay.      Objective: See treatment diary below.      Assessment:  Patient presents c/ decreased pain overall, but has occasional flare ups c/ activity.  He demonstrated increased pain at L PSIS c/ bridges today, which decreased afterwards.  Tolerated treatment well. Patient would benefit from continued PT.      Plan: Continue per plan of care.      Precautions: HTN      Manuals             UPAs L4-S1             L hip mobs AZ            R sidelying lumbar mob AZ            STM/MFR L hip AZ            L hip gentle PROM             Neuro Re-Ed             TA isometrics 3x10 5s                                                                                          Ther Ex             Hip add 3x10 5s            Clams             Hip abd 3x10 5s            Bridges 2x10            Hamstring mobility sup str 10x2s            Sciatic n glide sup 10x            SKTC 20x5s            LTR 20x5s            Seated Piriformis str 3x30s            Sup piriformis str 3x30s                         Ther Activity                                       Gait Training                                       Modalities              10'            ICE

## 2024-05-10 ENCOUNTER — CONSULT (OUTPATIENT)
Dept: PAIN MEDICINE | Facility: CLINIC | Age: 79
End: 2024-05-10
Payer: MEDICARE

## 2024-05-10 ENCOUNTER — OFFICE VISIT (OUTPATIENT)
Dept: PHYSICAL THERAPY | Facility: MEDICAL CENTER | Age: 79
End: 2024-05-10
Payer: MEDICARE

## 2024-05-10 VITALS
SYSTOLIC BLOOD PRESSURE: 174 MMHG | HEIGHT: 68 IN | DIASTOLIC BLOOD PRESSURE: 78 MMHG | BODY MASS INDEX: 24.25 KG/M2 | WEIGHT: 160 LBS

## 2024-05-10 DIAGNOSIS — M54.42 CHRONIC LEFT-SIDED LOW BACK PAIN WITH LEFT-SIDED SCIATICA: ICD-10-CM

## 2024-05-10 DIAGNOSIS — G89.29 CHRONIC LEFT-SIDED LOW BACK PAIN WITH LEFT-SIDED SCIATICA: ICD-10-CM

## 2024-05-10 DIAGNOSIS — G89.29 CHRONIC LEFT-SIDED LOW BACK PAIN WITHOUT SCIATICA: ICD-10-CM

## 2024-05-10 DIAGNOSIS — M47.817 LUMBOSACRAL SPONDYLOSIS WITHOUT MYELOPATHY: Primary | ICD-10-CM

## 2024-05-10 DIAGNOSIS — M25.552 PAIN IN LEFT HIP: ICD-10-CM

## 2024-05-10 DIAGNOSIS — M51.36 LUMBAR DEGENERATIVE DISC DISEASE: Primary | ICD-10-CM

## 2024-05-10 DIAGNOSIS — M54.50 CHRONIC LEFT-SIDED LOW BACK PAIN WITHOUT SCIATICA: ICD-10-CM

## 2024-05-10 DIAGNOSIS — M47.816 FACET ARTHROPATHY, LUMBAR: ICD-10-CM

## 2024-05-10 PROCEDURE — 97112 NEUROMUSCULAR REEDUCATION: CPT | Performed by: PHYSICAL THERAPIST

## 2024-05-10 PROCEDURE — 97140 MANUAL THERAPY 1/> REGIONS: CPT | Performed by: PHYSICAL THERAPIST

## 2024-05-10 PROCEDURE — 99204 OFFICE O/P NEW MOD 45 MIN: CPT | Performed by: ANESTHESIOLOGY

## 2024-05-10 PROCEDURE — G2211 COMPLEX E/M VISIT ADD ON: HCPCS | Performed by: ANESTHESIOLOGY

## 2024-05-10 PROCEDURE — 97110 THERAPEUTIC EXERCISES: CPT | Performed by: PHYSICAL THERAPIST

## 2024-05-10 NOTE — PROGRESS NOTES
Assessment  1. Lumbosacral spondylosis without myelopathy    2. Chronic left-sided low back pain without sciatica      Patient presenting with chronic left-sided axial back pain for greater than 1 year, worsening over the past several months.  Pain is consistent with lumbar spondylosis accompanied by pain 6/10 on the pain scale with inability to participate in IADLs for >6 weeks. Patient has participated with physical therapy as well as home exercises and stretches.  Patient has tried Tylenol, NSAIDs, oral steroids, gabapentin with mixed benefit.    Denies any bowel or bladder incontinence, saddle anesthesia.    Patient has had hip bursa injections with some degree of benefit. Patient describes his pain is more than 80% axial versus 10 to 20% radicular.    In regards to the patient's pathology, we discussed the various treatment options including physical therapy, chiropractic treatment, medication management, activity modifications, interventional spine procedures.  Given that patient has not had any benefit with conservative treatments, I think patient would benefit from targeted interventional treatment modalities.    Independently reviewed and interpreted lumbar MRI-this showed multilevel degenerative changes with mild spinal stenosis from L3-S1 with severe bilateral foraminal narrowing at L5-S1.  Moderate degenerative facet changes at L4-5 and L5-S1.    The patient has been experiencing moderate to severe axial spine pain that is causing functional deficit.  The pain has been present for at least 3 months and is not improving with conservative care.  Currently the patient is not experiencing any radicular features nor neurogenic claudication.  Non-facet pathology has been ruled out on clinical evaluation.    The patient's low back pain persists despite time, relative rest, activity modification and therapy. Based on the patient's symptoms examination, I suspect that the pain is being generated by the lumbar  facet joints. The facet joints are only one of many possible low-back pain generators.     Plan    We will schedule the patient for left L3-5 medial branch nerve blocks with intention of moving forward towards radiofrequency ablation if there is an appropriate diagnostic response. The initial blocks will be performed with 2% lidocaine and if an appropriate response is obtained upon review of the patient's pain diary, a confirmatory block will be scheduled with 0.5% bupivacaine.  In the office today, we reviewed the nature of facet joint pathology in depth using a spine model. We discussed the approach we would use for the injections and provided literature for home review. The patient understands the risks associated with the procedure including bleeding, infection, tissue injury, and allergic reaction and provided verbal informed consent in the office today.     Patient will follow-up after first diagnostic medial branch block.  He is requesting virtual visits for medial branch block follow-ups.    Reviewed external notes from orthopedics sports medicine (4/16/2024), physical therapy (5/8/2024), family medicine (5/1/2024) in regards to recent and prior relevant medical histories, treatment recommendations, medication and/or interventional treatment responses.    Reviewed hemoglobin A1c, renal function, CBC and/or PT/INR prior to discussing/offering interventional modalities.    [place] Prescription Drug Monitoring Program report was reviewed and was appropriate     My impressions and treatment recommendations were discussed in detail with the patient who verbalized understanding and had no further questions.  Discharge instructions were provided. I personally saw and examined the patient and I agree with the above discussed plan of care.    Orders Placed This Encounter   Procedures    FL spine and pain procedure     Virtual visit after MBBs     Standing Status:   Future     Standing Expiration Date:    5/10/2028     Order Specific Question:   Reason for Exam:     Answer:   left L3-5 MBB #1     Order Specific Question:   Anticoagulant hold needed?     Answer:   no     No orders of the defined types were placed in this encounter.      History of Present Illness    Parker Hinojosa is a 79 y.o. male presenting for consultation at [facility] for exam and evaluation of chronic left sided low back and hip pain for greater than 1 year, worsening over the past several months. Pain started without any precipitating injury or trauma. Over the past month, the intensity of pain has been Moderate. Pain is currently 6/10. Pain does interfere with age appropriate activities of daily living. Pain is intermittent, worse in the morning. Pain is described as shooting, sharp, aching. Patient denies weakness in the lower extremities. Assistance device used: None.    Pain is increased with standing, twisting, walking.   Pain is decreased with sitting.    Treatments tried:   PT: yes  Chiropractic therapy: no  Injections: no   Previous spine surgery: No    Anticoagulation: no    Medications tried:   Tylenol, gabapentin, ibuprofen, oral steroids    I have personally reviewed and/or updated the patient's past medical history, past surgical history, family history, social history, current medications, allergies, and vital signs today.     Review of Systems   Constitutional:  Negative for chills and fever.   HENT:  Negative for ear pain and sore throat.    Eyes:  Negative for pain and visual disturbance.   Respiratory:  Negative for cough and shortness of breath.    Cardiovascular:  Negative for chest pain and palpitations.   Gastrointestinal:  Negative for abdominal pain and vomiting.   Genitourinary:  Negative for dysuria and hematuria.   Musculoskeletal:  Positive for back pain, gait problem and myalgias. Negative for arthralgias.   Skin:  Negative for color change and rash.   Neurological:  Positive for  weakness and numbness. Negative for seizures and syncope.   All other systems reviewed and are negative.      Patient Active Problem List   Diagnosis    Hyperlipidemia    Hypertension    Allergic rhinitis    Elevated prostate specific antigen (PSA)    Multiple thyroid nodules    Primary osteoarthritis of first carpometacarpal joint of left hand    Osteoarthritis of right acromioclavicular joint    Hyperglycemia    Sebaceous cyst    Insufficiency of tear film of both eyes    Meibomian gland dysfunction of right eye    Posterior vitreous detachment    Edema of left foot    Bursitis    Eczema    CKD (chronic kidney disease)    Piriformis syndrome of left side    Back pain    Stress due to illness of family member    Controlled substance agreement signed       Past Medical History:   Diagnosis Date    Closed fracture of one rib     Last Assessed:  10/20/13    Hyperlipidemia     Hypertension     Left leg numbness 12/17/2019    Onychomycosis of toenail 9/2/2014       Past Surgical History:   Procedure Laterality Date    CRYOTHERAPY Bilateral 05/31/2022    posterior nasal nerve - office procedure - Dr. Britt - [facility]    TONSILLECTOMY         Family History   Problem Relation Age of Onset    Stroke Mother     Alcohol abuse Father     Depression Brother     Suicidality Brother        Social History     Occupational History    Occupation: RETIRED   Tobacco Use    Smoking status: Never    Smokeless tobacco: Never   Vaping Use    Vaping status: Never Used   Substance and Sexual Activity    Alcohol use: No     Comment: As per Allscripts:  Social drinker    Drug use: No    Sexual activity: Not on file       Current Outpatient Medications on File Prior to Visit   Medication Sig    ascorbic acid (VITAMIN C) 500 mg tablet Take 500 mg by mouth daily    Cholecalciferol (VITAMIN D3) 14018 units TABS Take 2,000 Units by mouth     clonazePAM (KlonoPIN) 0.5 mg tablet Take 0.5 tablets (0.25 mg total) by mouth 2 (two) times a day as  "needed for anxiety    hydroCHLOROthiazide 25 mg tablet TAKE 1/2 TABLET BY MOUTH DAILY    hydrocortisone valerate (WEST-RITA) 0.2 % ointment Apply topically 2 (two) times a day    metoprolol tartrate (LOPRESSOR) 50 mg tablet TAKE 1 TABLET BY MOUTH TWICE A DAY    RESTASIS 0.05 % ophthalmic emulsion     methylPREDNISolone 4 MG tablet therapy pack Use as directed on package (Patient not taking: Reported on 5/10/2024)     No current facility-administered medications on file prior to visit.       No Known Allergies    Physical Exam    BP (!) 174/78   Ht 5' 8\" (1.727 m)   Wt 72.6 kg (160 lb)   BMI 24.33 kg/m²     Constitutional: normal, well developed, well nourished, alert, in no distress and non-toxic and no overt pain behavior.  Eyes: anicteric  HEENT: grossly intact  Neck: supple, symmetric, trachea midline and no masses   Pulmonary:even and unlabored  Cardiovascular:No edema or pitting edema present  Skin:Normal without rashes or lesions and well hydrated  Psychiatric:Mood and affect appropriate  Neurologic: Motor function is grossly intact with no focal neurologic deficits   Musculoskeletal: Pain reproducible with left lumbar extension lateral flexion    Imaging  MRI lumbar spine  FINDINGS:     VERTEBRAL BODIES:  There are 5 lumbar type vertebral bodies. Grade 1 anterior spondylolisthesis of L5 upon S1. No scoliosis. No spondylolysis. Mild endplate marrow degenerative change at the L5-S1 levels, primarily Modic type I.     SACRUM:  Normal signal within the sacrum. No evidence of insufficiency or stress fracture.     DISTAL CORD AND CONUS:  Normal size and signal within the distal cord and conus.     PARASPINAL SOFT TISSUES:  Paraspinal soft tissues are unremarkable.     LOWER THORACIC DISC SPACES:  Normal disc height and signal.  No disc herniation, canal stenosis or foraminal narrowing.     LUMBAR DISC SPACES:     L1-L2:  Normal.     L2-L3: Mild annular bulging. Mild endplate and facet degenerative change. No " focal disc herniation. There is mild canal stenosis. No foraminal nerve impingement.     L3-L4: Mild diffuse annular bulging and mild endplate hypertrophic change. Trace facet effusions. Mild canal stenosis and bilateral foraminal narrowing.     L4-L5: Mild diffuse annular bulging with mild endplate and facet arthropathy. There is mild canal stenosis and bilateral foraminal narrowing.     L5-S1: Disc desiccation and loss of disc height with advanced circumferential annular bulging. Moderate facet hypertrophic degenerative change with small facet effusions. There is mild central canal stenosis. However, there is severe bilateral foraminal   narrowing with significant compression of both exiting nerves. Degenerative changes at this level have significantly progressed from the prior exam.     OTHER FINDINGS:  None.     IMPRESSION:     Progression of lumbar degenerative change at the L5-S1 level when compared with the prior study from 2010. There is now severe bilateral foraminal narrowing with compression of both exiting nerves.     Additional annular bulging and facet degenerative change with mild canal stenosis and foraminal narrowing at the L2-3, L3-4 and L4-5 levels, also more prominent than the prior study.

## 2024-05-10 NOTE — PROGRESS NOTES
Daily Note     Today's date: 5/10/2024  Patient name: Parker Hinojosa  : 1945  MRN: [id number]  Referring provider: Enrrique Marin MD  Dx:   Encounter Diagnosis     ICD-10-CM    1. Lumbar degenerative disc disease  M51.36       2. Facet arthropathy, lumbar  M47.816       3. Chronic left-sided low back pain with left-sided sciatica  M54.42     G89.29       4. Pain in left hip  M25.552                      Subjective: Patient states that he felt pretty good after last tx session.      Objective: See treatment diary below.      Assessment:  Patient presents c/ decreased pain overall.  He is following up c/ physician today to review his MRI report.  He continues c/ mild pain at the L5-S1 region on the left c/ certain activities.  Patient may benefit from an injection to reduce inflammation.  Tolerated treatment well. Patient would benefit from continued PT.      Plan: Continue per plan of care.      Precautions: HTN      Manuals 5/8 5/10           UPAs L4-S1             L hip mobs AZ AZ           R sidelying lumbar mob AZ AZ           STM/MFR L hip AZ AZ           L hip gentle PROM             Neuro Re-Ed             TA isometrics 3x10 5s 3x10 5s                                                                                         Ther Ex             Hip add 3x10 5s 3x10 5s           Clams             Hip abd 3x10 5s 3x10 5s blue           Bridges 2x10 3x10           Hamstring mobility sup str 10x2s 10x2s           Sciatic n glide sup 10x 10x           SKTC 20x5s 20x5s           LTR 20x5s 20x5s           Seated Piriformis str 3x30s 3x30s           Sup piriformis str 3x30s 3x30s                        Ther Activity                                       Gait Training                                       Modalities              10' dec           ICE

## 2024-05-22 ENCOUNTER — OFFICE VISIT (OUTPATIENT)
Dept: PHYSICAL THERAPY | Facility: MEDICAL CENTER | Age: 79
End: 2024-05-22
Payer: MEDICARE

## 2024-05-22 DIAGNOSIS — G89.29 CHRONIC LEFT-SIDED LOW BACK PAIN WITH LEFT-SIDED SCIATICA: ICD-10-CM

## 2024-05-22 DIAGNOSIS — M54.42 CHRONIC LEFT-SIDED LOW BACK PAIN WITH LEFT-SIDED SCIATICA: ICD-10-CM

## 2024-05-22 DIAGNOSIS — M25.552 PAIN IN LEFT HIP: ICD-10-CM

## 2024-05-22 DIAGNOSIS — M47.816 FACET ARTHROPATHY, LUMBAR: ICD-10-CM

## 2024-05-22 DIAGNOSIS — M51.36 LUMBAR DEGENERATIVE DISC DISEASE: Primary | ICD-10-CM

## 2024-05-22 PROCEDURE — 97110 THERAPEUTIC EXERCISES: CPT | Performed by: PHYSICAL THERAPIST

## 2024-05-22 PROCEDURE — 97140 MANUAL THERAPY 1/> REGIONS: CPT | Performed by: PHYSICAL THERAPIST

## 2024-05-22 NOTE — PROGRESS NOTES
Daily Note     Today's date: 2024  Patient name: Parker Hinojosa  : 1945  MRN: [id number]  Referring provider: Enrrique Marin MD  Dx:   Encounter Diagnosis     ICD-10-CM    1. Lumbar degenerative disc disease  M51.36       2. Facet arthropathy, lumbar  M47.816       3. Chronic left-sided low back pain with left-sided sciatica  M54.42     G89.29       4. Pain in left hip  M25.552                      Subjective: Patient states that he followed up c/ Dr. Walter in pain management.  His back and hip are starting to trend towards feeling better.      Objective: See treatment diary below.      Assessment:  Parker Hinojosa has been compliant with attending PT and home exercise program since initial eval.  Parker  has made improvements in objective data since initial evalulation and has made progress towards goals.  Patient continues to follow up c/ a chiropractor and will follow up c/ Dr. Marin.  He is doing better overall and is better able to recognize activities that aggravate his symptoms.  Discussed HEP moving forward.  Extensive discussion and pt education provided during tx session.  All of patient's questions/concerns were answered.  Pt verbalized understanding and agreement to plan of care. Thus it was mutually decided to discontinue this episode of care and transition to Home Exercise Program.      Plan:  d/c to HEP and return to PT prn.     Precautions: HTN      Manuals 5/8 5/10 5/22          UPAs L4-S1             L hip mobs AZ AZ           R sidelying lumbar mob AZ AZ           STM/MFR L hip AZ AZ AZ          L hip gentle PROM             Neuro Re-Ed             TA isometrics 3x10 5s 3x10 5s                                                                                         Ther Ex   HEP Review & pt education          Hip add 3x10 5s 3x10 5s           Clams             Hip abd 3x10 5s 3x10 5s blue           Bridges 2x10 3x10           Hamstring mobility sup str 10x2s 10x2s           Sciatic  n glide sup 10x 10x           SKTC 20x5s 20x5s           LTR 20x5s 20x5s           Seated Piriformis str 3x30s 3x30s           Sup piriformis str 3x30s 3x30s                        Ther Activity                                       Gait Training                                       Modalities              10' dec           ICE

## 2024-05-23 ENCOUNTER — OFFICE VISIT (OUTPATIENT)
Dept: OBGYN CLINIC | Facility: MEDICAL CENTER | Age: 79
End: 2024-05-23
Payer: MEDICARE

## 2024-05-23 VITALS
HEART RATE: 68 BPM | HEIGHT: 68 IN | BODY MASS INDEX: 24.4 KG/M2 | WEIGHT: 161 LBS | SYSTOLIC BLOOD PRESSURE: 149 MMHG | DIASTOLIC BLOOD PRESSURE: 76 MMHG

## 2024-05-23 DIAGNOSIS — G89.29 CHRONIC LEFT-SIDED LOW BACK PAIN WITHOUT SCIATICA: Primary | ICD-10-CM

## 2024-05-23 DIAGNOSIS — M70.62 TROCHANTERIC BURSITIS OF LEFT HIP: ICD-10-CM

## 2024-05-23 DIAGNOSIS — M51.36 LUMBAR DEGENERATIVE DISC DISEASE: ICD-10-CM

## 2024-05-23 DIAGNOSIS — M25.552 PAIN IN LEFT HIP: ICD-10-CM

## 2024-05-23 DIAGNOSIS — M47.816 FACET ARTHROPATHY, LUMBAR: ICD-10-CM

## 2024-05-23 DIAGNOSIS — M54.50 CHRONIC LEFT-SIDED LOW BACK PAIN WITHOUT SCIATICA: Primary | ICD-10-CM

## 2024-05-23 PROCEDURE — 99213 OFFICE O/P EST LOW 20 MIN: CPT | Performed by: EMERGENCY MEDICINE

## 2024-05-23 RX ORDER — METHYLPREDNISOLONE 4 MG/1
TABLET ORAL
Qty: 1 EACH | Refills: 0 | Status: SHIPPED | OUTPATIENT
Start: 2024-05-23

## 2024-05-23 NOTE — PATIENT INSTRUCTIONS
While taking the oral steroid Medrol Dosepak, do not take any NSAIDs such as Advil, Motrin, ibuprofen, Motrin, Aleve, naproxen, Celebrex or Meloxicam.  You can restart the NSAIDs after you finish the steroids.    However you may take Tylenol 500mg every 4-6 hours as needed OR max 1,000mg per dose up to 3 times per day for a total of 3,000mg per day  While taking oral steroids, you may experience mild side effects such as feeling jittery or flushing.  Please call if your side effects are significant or you have any questions.

## 2024-05-23 NOTE — PROGRESS NOTES
Assessment/Plan:    Diagnoses and all orders for this visit:    Chronic left-sided low back pain without sciatica  -     methylPREDNISolone 4 MG tablet therapy pack; Use as directed on package    Lumbar degenerative disc disease  -     methylPREDNISolone 4 MG tablet therapy pack; Use as directed on package    Facet arthropathy, lumbar  -     methylPREDNISolone 4 MG tablet therapy pack; Use as directed on package    Pain in left hip  -     methylPREDNISolone 4 MG tablet therapy pack; Use as directed on package    Trochanteric bursitis of left hip    Provided Medrol pack refill as requested by patient discussed risks of steroid use  S/p Pain Management consult declines MBB  S/p Medrol dose pack  S/p Left GT CSI  Reviewed Pain management and PT notes    Return if symptoms worsen or fail to improve.      Subjective:   Patient ID: Parker Hinojosa is a 79 y.o. male.    Patient returns to the office status post left hip GT CSI last evaluation.  He noted a burning pain of the hip for about a week afterwards.    S/p Pain Management consult Dr. Walter recommended MBBs however patient declines at this time.   States his chiropractor told him he may benefit from repeat Medrol pack    Previous note:  Patient returns s/p Medrol pack with significant benefit however pain symptoms returned.  He attributes some of his symptoms to having to care for his wife who had Parkinsons dz.    Started PT.  Notes localized swelling/lump of the volar proximal forearm from playing pickleball     Initial note:  NP presents for worsening left hip pain worse in morning now affecting his pickle ball play.  He does have a history of chronic lower back issues treating with a chiropractor who mentions that he may have piriformis syndrome.  Takes Tylenol, and IBU with meanls occasionally PRN.  He did have x-rays of the hip and lumbar spine in 2023.  States he was previously seen by rheumatology and provided GT CSI 6/2023 for left Bursitis with mild  "relief          Review of Systems    The following portions of the patient's chart were reviewed and updated as appropriate:   Allergy:  No Known Allergies    Medications:    Current Outpatient Medications:     ascorbic acid (VITAMIN C) 500 mg tablet, Take 500 mg by mouth daily, Disp: , Rfl:     Cholecalciferol (VITAMIN D3) 79758 units TABS, Take 2,000 Units by mouth , Disp: , Rfl:     clonazePAM (KlonoPIN) 0.5 mg tablet, Take 0.5 tablets (0.25 mg total) by mouth 2 (two) times a day as needed for anxiety, Disp: 30 tablet, Rfl: 0    hydroCHLOROthiazide 25 mg tablet, TAKE 1/2 TABLET BY MOUTH DAILY, Disp: 45 tablet, Rfl: 1    hydrocortisone valerate (WEST-RITA) 0.2 % ointment, Apply topically 2 (two) times a day, Disp: 45 g, Rfl: 0    methylPREDNISolone 4 MG tablet therapy pack, Use as directed on package, Disp: 1 each, Rfl: 0    metoprolol tartrate (LOPRESSOR) 50 mg tablet, TAKE 1 TABLET BY MOUTH TWICE A DAY, Disp: 180 tablet, Rfl: 1    RESTASIS 0.05 % ophthalmic emulsion, , Disp: , Rfl:     Patient Active Problem List   Diagnosis    Hyperlipidemia    Hypertension    Allergic rhinitis    Elevated prostate specific antigen (PSA)    Multiple thyroid nodules    Primary osteoarthritis of first carpometacarpal joint of left hand    Osteoarthritis of right acromioclavicular joint    Hyperglycemia    Sebaceous cyst    Insufficiency of tear film of both eyes    Meibomian gland dysfunction of right eye    Posterior vitreous detachment    Edema of left foot    Bursitis    Eczema    CKD (chronic kidney disease)    Piriformis syndrome of left side    Back pain    Stress due to illness of family member    Controlled substance agreement signed       Objective:  /76   Pulse 68   Ht 5' 8\" (1.727 m)   Wt 73 kg (161 lb)   BMI 24.48 kg/m²     Ortho Exam    Physical Exam      Neurologic Exam    Procedures    I have personally reviewed the written report of the pertinent studies.             Past Medical History:   Diagnosis " Date    Closed fracture of one rib     Last Assessed:  10/20/13    Hyperlipidemia     Hypertension     Left leg numbness 12/17/2019    Onychomycosis of toenail 9/2/2014       Past Surgical History:   Procedure Laterality Date    CRYOTHERAPY Bilateral 05/31/2022    posterior nasal nerve - office procedure - Dr. Britt - [facility]    TONSILLECTOMY         Social History     Socioeconomic History    Marital status: /Civil Union     Spouse name: Not on file    Number of children: Not on file    Years of education: Not on file    Highest education level: Not on file   Occupational History    Occupation: RETIRED   Tobacco Use    Smoking status: Never    Smokeless tobacco: Never   Vaping Use    Vaping status: Never Used   Substance and Sexual Activity    Alcohol use: No     Comment: As per Allscripts:  Social drinker    Drug use: No    Sexual activity: Not on file   Other Topics Concern    Not on file   Social History Narrative    CONSUMES 2 CUPS OF COFFEE PER DAY    Activities:  Skiing, tennis     Social Determinants of Health     Financial Resource Strain: Low Risk  (5/1/2024)    Overall Financial Resource Strain (CARDIA)     Difficulty of Paying Living Expenses: Not hard at all   Food Insecurity: No Food Insecurity (5/1/2024)    Hunger Vital Sign     Worried About Running Out of Food in the Last Year: Never true     Ran Out of Food in the Last Year: Never true   Transportation Needs: No Transportation Needs (5/1/2024)    PRAPARE - Transportation     Lack of Transportation (Medical): No     Lack of Transportation (Non-Medical): No   Physical Activity: Not on file   Stress: No Stress Concern Present (5/1/2024)    [employer] [place] [employer] - Occupational Stress Questionnaire     Feeling of Stress : Not at all   Social Connections: Not on file   Intimate Partner Violence: Not At Risk (5/1/2024)    Humiliation, Afraid, Rape, and Kick questionnaire     Fear of Current or Ex-Partner: No     Emotionally  Abused: No     Physically Abused: No     Sexually Abused: No   Housing Stability: Low Risk  (5/1/2024)    Housing Stability Vital Sign     Unable to Pay for Housing in the Last Year: No     Number of Places Lived in the Last Year: 0     Unstable Housing in the Last Year: No       Family History   Problem Relation Age of Onset    Stroke Mother     Alcohol abuse Father     Depression Brother     Suicidality Brother

## 2024-07-26 DIAGNOSIS — I10 ESSENTIAL HYPERTENSION: ICD-10-CM

## 2024-07-26 RX ORDER — METOPROLOL TARTRATE 50 MG/1
TABLET, FILM COATED ORAL
Qty: 200 TABLET | Refills: 1 | Status: SHIPPED | OUTPATIENT
Start: 2024-07-26

## 2024-10-30 ENCOUNTER — TELEPHONE (OUTPATIENT)
Dept: ADMINISTRATIVE | Facility: OTHER | Age: 79
End: 2024-10-30

## 2024-10-30 NOTE — TELEPHONE ENCOUNTER
10/30/24 12:25 PM    Patient contacted to bring Advance Directive, POLST, or Living Will document to next scheduled pcp visit.[facility] Department spoke with patient/ caregiver.    Thank you.  Jacinda Verdugo MA  [facility]

## 2024-11-01 DIAGNOSIS — I10 ESSENTIAL HYPERTENSION: ICD-10-CM

## 2024-11-01 RX ORDER — HYDROCHLOROTHIAZIDE 25 MG/1
12.5 TABLET ORAL DAILY
Qty: 45 TABLET | Refills: 1 | Status: SHIPPED | OUTPATIENT
Start: 2024-11-01

## 2024-11-06 ENCOUNTER — OFFICE VISIT (OUTPATIENT)
Dept: FAMILY MEDICINE CLINIC | Facility: CLINIC | Age: 79
End: 2024-11-06
Payer: MEDICARE

## 2024-11-06 VITALS
DIASTOLIC BLOOD PRESSURE: 68 MMHG | WEIGHT: 161 LBS | BODY MASS INDEX: 24.4 KG/M2 | SYSTOLIC BLOOD PRESSURE: 130 MMHG | HEART RATE: 52 BPM | RESPIRATION RATE: 18 BRPM | HEIGHT: 68 IN | OXYGEN SATURATION: 95 %

## 2024-11-06 DIAGNOSIS — R97.20 ELEVATED PROSTATE SPECIFIC ANTIGEN (PSA): ICD-10-CM

## 2024-11-06 DIAGNOSIS — I10 PRIMARY HYPERTENSION: Primary | ICD-10-CM

## 2024-11-06 DIAGNOSIS — Z23 NEEDS FLU SHOT: ICD-10-CM

## 2024-11-06 DIAGNOSIS — M51.9 LUMBAR DISC DISEASE: ICD-10-CM

## 2024-11-06 DIAGNOSIS — Z29.11 NEED FOR RSV VACCINATION: ICD-10-CM

## 2024-11-06 DIAGNOSIS — Z63.79 STRESS DUE TO ILLNESS OF FAMILY MEMBER: ICD-10-CM

## 2024-11-06 DIAGNOSIS — M70.62 TROCHANTERIC BURSITIS OF LEFT HIP: ICD-10-CM

## 2024-11-06 DIAGNOSIS — E78.2 MIXED HYPERLIPIDEMIA: ICD-10-CM

## 2024-11-06 DIAGNOSIS — E04.2 MULTIPLE THYROID NODULES: ICD-10-CM

## 2024-11-06 DIAGNOSIS — N18.31 STAGE 3A CHRONIC KIDNEY DISEASE (HCC): ICD-10-CM

## 2024-11-06 DIAGNOSIS — Z23 NEED FOR COVID-19 VACCINE: ICD-10-CM

## 2024-11-06 DIAGNOSIS — M19.011 OSTEOARTHRITIS OF RIGHT ACROMIOCLAVICULAR JOINT: ICD-10-CM

## 2024-11-06 DIAGNOSIS — R73.9 HYPERGLYCEMIA: ICD-10-CM

## 2024-11-06 PROBLEM — H25.9 AGE-RELATED CATARACT OF BOTH EYES: Status: ACTIVE | Noted: 2024-10-08

## 2024-11-06 PROCEDURE — 90662 IIV NO PRSV INCREASED AG IM: CPT | Performed by: FAMILY MEDICINE

## 2024-11-06 PROCEDURE — 99214 OFFICE O/P EST MOD 30 MIN: CPT | Performed by: FAMILY MEDICINE

## 2024-11-06 PROCEDURE — G0008 ADMIN INFLUENZA VIRUS VAC: HCPCS | Performed by: FAMILY MEDICINE

## 2024-11-06 NOTE — ASSESSMENT & PLAN NOTE
Blood pressure is doing well.  Continue on metoprolol, hydrochlorothiazide.  Check labs.  Orders:    CBC and differential; Future    Comprehensive metabolic panel; Future    TSH, 3rd generation; Future

## 2024-11-06 NOTE — PATIENT INSTRUCTIONS
1. Primary hypertension  Assessment & Plan:  Blood pressure is doing well.  Continue on metoprolol, hydrochlorothiazide.  Check labs.  Orders:    CBC and differential; Future    Comprehensive metabolic panel; Future    TSH, 3rd generation; Future    Orders:  -     CBC and differential; Future  -     Comprehensive metabolic panel; Future; Expected date: 11/06/2024  -     TSH, 3rd generation; Future; Expected date: 11/06/2024  2. Trochanteric bursitis of left hip  Assessment & Plan:  Minimal to no problems with this.  Will resolve.       3. Mixed hyperlipidemia  Assessment & Plan:  Not currently on medications.  Will check labs just for continuity.  Unlikely to start medications given patient's advanced maturity.  Orders:    Comprehensive metabolic panel; Future    Lipid Panel with Direct LDL reflex; Future    Orders:  -     Comprehensive metabolic panel; Future; Expected date: 11/06/2024  -     Lipid Panel with Direct LDL reflex; Future; Expected date: 11/06/2024  4. Stage 3a chronic kidney disease (HCC)  Assessment & Plan:  Lab Results   Component Value Date    EGFR 51 11/10/2023    EGFR 58 10/11/2022    EGFR 57 09/13/2022    CREATININE 1.31 (H) 11/10/2023    CREATININE 1.17 10/11/2022    CREATININE 1.20 09/13/2022   Check labs.  Last GFR was slightly low.  Encourage hydration.    Orders:    Comprehensive metabolic panel; Future    Orders:  -     Comprehensive metabolic panel; Future; Expected date: 11/06/2024  5. Multiple thyroid nodules  Assessment & Plan:  Reviewed about thyroid nodules on prior discussion with endocrinology.  They did recommend a 2-year follow-up ultrasound.  Ordered.  Also ordered TSH.  Patient will consider follow-up with ultrasound.  Orders:    US thyroid; Future    TSH, 3rd generation; Future    Orders:  -     US thyroid; Future; Expected date: 11/06/2024  -     TSH, 3rd generation; Future; Expected date: 11/06/2024  6. Elevated prostate specific antigen (PSA)  Assessment & Plan:  Check  labs.  Ordered.       7. Lumbar disc disease  Assessment & Plan:  Patient did have lumbar disc disease noted on the MRI that he had in May.  Currently, not really having any problems or issues with this.  If he does develop more problems or issues, would recommend follow-up with pain management to discuss further options for intervention.       8. Osteoarthritis of right acromioclavicular joint  Assessment & Plan:  Minimal to no pain on the right, some on the left.  Not interfering with day to day activity. No treatment needed.           9. Hyperglycemia  Assessment & Plan:  Stable.  Check labs.       10. Stress due to illness of family member  Assessment & Plan:  Patient appears to have developed some fantastic coping skills for this.  He does have a quite reasonable support network.  Continues on the clonazepam, but barely using the tablets.  Continue with current treatment.  Encouraged him to continue to be vigilant with regard to his own mental health based on this.       11. Needs flu shot  Comments:  Vaccine today.  Orders:  -     influenza vaccine, high-dose, PF 0.5 mL (Fluzone High Dose)  12. Need for COVID-19 vaccine  Comments:  Patient declined vaccine.  13. Need for RSV vaccination  Comments:  Reviewed about the RSV vaccine. Would need at local pharmacy.      COVID 19 Instructions    Parker MARCELA BellHinojosa was advised to limit contact with others to essential tasks such as getting food, medications, and medical care.    Proper handwashing reviewed, and Hand sanitzer when washing is not available.    If the patient develops symptoms of COVID 19, the patient should call the office as soon as possible.    It is strongly recommended that Flu Vaccinations be obtained.      Virtual Visits:  Alexis: This works on smart phones (any phone with Internet browsing capability).  You should get a text message when the provider is ready to see you.  Click on the link in the text message, and the call should start.  You will  need to type in your name, and allow camera and microphone access.  This is HIPPA compliant, and secure.      If you have not already done so, get immunized to COVID 19.      We are committed to getting you vaccinated as soon as possible and will be closely following CDC and [facility] guidelines as they are released and revised.  Please refer to our COVID-19 vaccine webpage for the most up to date information on the vaccine and our distribution efforts.    This site will also have the most up to date recommendations for COVID booster vaccine.    https://[email or web address].org/covid-19/protect-[email or web address]-vaccine    Call [phone] ([phone]), option [phone]    You can also visit https://www.vaccines.gov/ to find vaccines in your area.    OUR LOCATION:    [facility]  [address] [place] [address], [address]  [place], [place], [address] [phone]  Fax: [phone]    Lab services, Rheumatology, and OB/GYN are at this location as well.    Thank you for your inquiry about the RSV vaccine.  As you can see below, the CDC has recommended that you discuss this with your Primary Care provider and decide if the vaccine is right for you.  This discussion can be accomplished at your next office visit.  The vaccine is currently available at your local pharmacy if you choose to get it prior to your next office visit.     Respiratory syncytial ([facility]) virus, or RSV, is a common respiratory virus that usually causes mild, cold-like symptoms. Most people recover in a week or two, but RSV can be serious. Infants and older adults are more likely to develop severe RSV and need hospitalization. Vaccines are available to protect older adults from severe RSV. Monoclonal antibody products are available to protect infants and young children from severe RSV.    CDC Recommendations  Adults 60 years old and over  Adults 60 years of age and older may receive a single dose of RSV vaccine using shared clinical  decision-making.    Infants and young children  1 dose of nirsevimab for all infants younger than 8 months born during or entering their first RSV season.  1 dose of nirsevimab for infants and children 8-19 months old who are at increased risk for severe RSV disease and entering their second RSV season.  Note: A different monoclonal antibody, palivizumab, is limited to children under 24 months of age with certain conditions that place them at high risk for severe RSV disease. It must be given once a month during RSV season. Please see AAP guidelines for palivizumab.    Pregnant people  1 dose of maternal RSV vaccine during weeks 32 through 36 of pregnancy, administered immediately before or during RSV season. Abrysvo is the only RSV vaccine recommended during pregnancy.    If you have any questions about RSV or the products above, talk to your healthcare provider.

## 2024-11-06 NOTE — ASSESSMENT & PLAN NOTE
Patient did have lumbar disc disease noted on the MRI that he had in May.  Currently, not really having any problems or issues with this.  If he does develop more problems or issues, would recommend follow-up with pain management to discuss further options for intervention.

## 2024-11-06 NOTE — ASSESSMENT & PLAN NOTE
Not currently on medications.  Will check labs just for continuity.  Unlikely to start medications given patient's advanced maturity.  Orders:    Comprehensive metabolic panel; Future    Lipid Panel with Direct LDL reflex; Future

## 2024-11-06 NOTE — ASSESSMENT & PLAN NOTE
Minimal to no pain on the right, some on the left.  Not interfering with day to day activity. No treatment needed.

## 2024-11-06 NOTE — ASSESSMENT & PLAN NOTE
Reviewed about thyroid nodules on prior discussion with endocrinology.  They did recommend a 2-year follow-up ultrasound.  Ordered.  Also ordered TSH.  Patient will consider follow-up with ultrasound.  Orders:    US thyroid; Future    TSH, 3rd generation; Future

## 2024-11-06 NOTE — ASSESSMENT & PLAN NOTE
Lab Results   Component Value Date    EGFR 51 11/10/2023    EGFR 58 10/11/2022    EGFR 57 09/13/2022    CREATININE 1.31 (H) 11/10/2023    CREATININE 1.17 10/11/2022    CREATININE 1.20 09/13/2022   Check labs.  Last GFR was slightly low.  Encourage hydration.    Orders:    Comprehensive metabolic panel; Future

## 2024-11-06 NOTE — PROGRESS NOTES
Ambulatory Visit  Name: Parker Hinojosa      : 1945      MRN: [id number]  Encounter Provider: Jose L Higuera MD  Encounter Date: 2024   Encounter department: [facility]    Assessment & Plan  Primary hypertension  Blood pressure is doing well.  Continue on metoprolol, hydrochlorothiazide.  Check labs.  Orders:    CBC and differential; Future    Comprehensive metabolic panel; Future    TSH, 3rd generation; Future    Trochanteric bursitis of left hip  Minimal to no problems with this.  Will resolve.       Mixed hyperlipidemia  Not currently on medications.  Will check labs just for continuity.  Unlikely to start medications given patient's advanced maturity.  Orders:    Comprehensive metabolic panel; Future    Lipid Panel with Direct LDL reflex; Future    Stage 3a chronic kidney disease (HCC)  Lab Results   Component Value Date    EGFR 51 11/10/2023    EGFR 58 10/11/2022    EGFR 57 2022    CREATININE 1.31 (H) 11/10/2023    CREATININE 1.17 10/11/2022    CREATININE 1.20 2022   Check labs.  Last GFR was slightly low.  Encourage hydration.    Orders:    Comprehensive metabolic panel; Future    Multiple thyroid nodules  Reviewed about thyroid nodules on prior discussion with endocrinology.  They did recommend a 2-year follow-up ultrasound.  Ordered.  Also ordered TSH.  Patient will consider follow-up with ultrasound.  Orders:    US thyroid; Future    TSH, 3rd generation; Future    Elevated prostate specific antigen (PSA)  Check labs.  Ordered.       Lumbar disc disease  Patient did have lumbar disc disease noted on the MRI that he had in May.  Currently, not really having any problems or issues with this.  If he does develop more problems or issues, would recommend follow-up with pain management to discuss further options for intervention.       Osteoarthritis of right acromioclavicular joint  Minimal to no pain on the right, some on the left.  Not interfering with  day to day activity. No treatment needed.           Hyperglycemia  Stable.  Check labs.       Stress due to illness of family member  Patient appears to have developed some fantastic coping skills for this.  He does have a quite reasonable support network.  Continues on the clonazepam, but barely using the tablets.  Continue with current treatment.  Encouraged him to continue to be vigilant with regard to his own mental health based on this.       Needs flu shot    Orders:    influenza vaccine, high-dose, PF 0.5 mL (Fluzone High Dose)    Need for COVID-19 vaccine         Need for RSV vaccination              1. Primary hypertension  -     CBC and differential; Future  -     Comprehensive metabolic panel; Future; Expected date: 11/06/2024  -     TSH, 3rd generation; Future; Expected date: 11/06/2024  2. Trochanteric bursitis of left hip  3. Mixed hyperlipidemia  -     Comprehensive metabolic panel; Future; Expected date: 11/06/2024  -     Lipid Panel with Direct LDL reflex; Future; Expected date: 11/06/2024  4. Stage 3a chronic kidney disease (HCC)  Assessment & Plan:  Lab Results   Component Value Date    EGFR 51 11/10/2023    EGFR 58 10/11/2022    EGFR 57 09/13/2022    CREATININE 1.31 (H) 11/10/2023    CREATININE 1.17 10/11/2022    CREATININE 1.20 09/13/2022     Orders:  -     Comprehensive metabolic panel; Future; Expected date: 11/06/2024  5. Multiple thyroid nodules  -     US thyroid; Future; Expected date: 11/06/2024  -     TSH, 3rd generation; Future; Expected date: 11/06/2024  6. Elevated prostate specific antigen (PSA)  7. Lumbar disc disease  8. Osteoarthritis of right acromioclavicular joint  Assessment & Plan:  Minimal to no pain on the right, some on the left.  Not interfering with day to day activity. No treatment needed.    9. Hyperglycemia  10. Stress due to illness of family member  11. Needs flu shot  Comments:  Vaccine today.  Orders:  -     influenza vaccine, high-dose, PF 0.5 mL (Fluzone High  Dose)  12. Need for COVID-19 vaccine  Comments:  Patient declined vaccine.  13. Need for RSV vaccination  Comments:  Reviewed about the RSV vaccine. Would need at local pharmacy.      Chief Complaint   Patient presents with    Follow-up     6 months follow up         History of Present Illness     Patient is here to follow-up on multiple issues.    Hypertension: Currently on hydrochlorothiazide, metoprolol.  Blood pressure reasonable.    Cholesterol: Not on medications.  Last labs last year.    Trochanteric bursitis: This was noted before.  Patient reports he is not currently having any pains.    Thyroid nodules: He did have a thyroid nodule noted previously.  In 2021 had an ultrasound and was recommended to repeat in 2 years.    CKD: Noted previously.  Patient would be due for blood work at this point.    Elevated PSA: Patient would be due for PSA.    Reviewed about flu and COVID-vaccine.  Also reviewed about RSV vaccine.    Patient did have an MRI in May for lower back pain.  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  At this point, the patient really is not having any problems, including weakness, numbness, or pain.  If he does increased activities, he will use a low back brace, and that seems to help out quite a bit for him.  Reviewed about options, including discussing further with pain management if he has more problems.    Stress: Patient reports he is tolerating this at this point.  Learning how to deal with it, redirecting some of the issues.  Still uses the clonazepam, but generally speaking uses 1/8 of a tablet at night.          Review of Systems   Constitutional: Negative.    HENT: Negative.     Eyes: Negative.    Respiratory: Negative.     Cardiovascular: Negative.    Gastrointestinal: Negative.    Endocrine: Negative.    Genitourinary: Negative.    Musculoskeletal: Negative.    Skin: Negative.    Allergic/Immunologic: Negative.    Neurological: Negative.   "  Hematological: Negative.    Psychiatric/Behavioral: Negative.             Objective     /68 (BP Location: Right arm, Patient Position: Sitting, Cuff Size: Large)   Pulse (!) 52   Resp 18   Ht 5' 8\" (1.727 m)   Wt 73 kg (161 lb)   SpO2 95%   BMI 24.48 kg/m²     Physical Exam  Vitals and nursing note reviewed.   Constitutional:       Appearance: Normal appearance. He is well-developed.   HENT:      Head: Normocephalic and atraumatic.   Cardiovascular:      Rate and Rhythm: Normal rate and regular rhythm.      Pulses:           Carotid pulses are 2+ on the right side and 2+ on the left side.     Heart sounds: Normal heart sounds. No murmur heard.     No friction rub. No gallop.   Pulmonary:      Effort: Pulmonary effort is normal. No respiratory distress.      Breath sounds: Normal breath sounds. No wheezing or rales.   Musculoskeletal:      Cervical back: Normal range of motion and neck supple.   Neurological:      Mental Status: He is alert.         "

## 2024-11-06 NOTE — ASSESSMENT & PLAN NOTE
Patient appears to have developed some fantastic coping skills for this.  He does have a quite reasonable support network.  Continues on the clonazepam, but barely using the tablets.  Continue with current treatment.  Encouraged him to continue to be vigilant with regard to his own mental health based on this.

## 2024-12-04 ENCOUNTER — TELEPHONE (OUTPATIENT)
Age: 79
End: 2024-12-04

## 2024-12-10 DIAGNOSIS — Z63.79 STRESS DUE TO ILLNESS OF FAMILY MEMBER: ICD-10-CM

## 2024-12-10 DIAGNOSIS — Z79.899 CONTROLLED SUBSTANCE AGREEMENT SIGNED: ICD-10-CM

## 2024-12-12 RX ORDER — CLONAZEPAM 0.5 MG/1
0.25 TABLET ORAL 2 TIMES DAILY PRN
Qty: 30 TABLET | Refills: 0 | Status: SHIPPED | OUTPATIENT
Start: 2024-12-12

## 2025-01-29 DIAGNOSIS — I10 ESSENTIAL HYPERTENSION: ICD-10-CM

## 2025-01-29 RX ORDER — METOPROLOL TARTRATE 50 MG
50 TABLET ORAL 2 TIMES DAILY
Qty: 180 TABLET | Refills: 2 | Status: SHIPPED | OUTPATIENT
Start: 2025-01-29

## 2025-04-08 ENCOUNTER — APPOINTMENT (OUTPATIENT)
Dept: LAB | Facility: CLINIC | Age: 80
End: 2025-04-08
Payer: MEDICARE

## 2025-04-08 DIAGNOSIS — E04.2 MULTIPLE THYROID NODULES: ICD-10-CM

## 2025-04-08 DIAGNOSIS — E78.2 MIXED HYPERLIPIDEMIA: ICD-10-CM

## 2025-04-08 DIAGNOSIS — I10 PRIMARY HYPERTENSION: ICD-10-CM

## 2025-04-08 DIAGNOSIS — N18.31 STAGE 3A CHRONIC KIDNEY DISEASE (HCC): ICD-10-CM

## 2025-04-08 LAB
ALBUMIN SERPL BCG-MCNC: 4.3 G/DL (ref 3.5–5)
ALP SERPL-CCNC: 52 U/L (ref 34–104)
ALT SERPL W P-5'-P-CCNC: 14 U/L (ref 7–52)
ANION GAP SERPL CALCULATED.3IONS-SCNC: 5 MMOL/L (ref 4–13)
AST SERPL W P-5'-P-CCNC: 13 U/L (ref 13–39)
BASOPHILS # BLD AUTO: 0.12 THOUSANDS/ÂΜL (ref 0–0.1)
BASOPHILS NFR BLD AUTO: 2 % (ref 0–1)
BILIRUB SERPL-MCNC: 0.58 MG/DL (ref 0.2–1)
BUN SERPL-MCNC: 26 MG/DL (ref 5–25)
CALCIUM SERPL-MCNC: 10.3 MG/DL (ref 8.4–10.2)
CHLORIDE SERPL-SCNC: 103 MMOL/L (ref 96–108)
CHOLEST SERPL-MCNC: 173 MG/DL (ref ?–200)
CO2 SERPL-SCNC: 30 MMOL/L (ref 21–32)
CREAT SERPL-MCNC: 1.25 MG/DL (ref 0.6–1.3)
EOSINOPHIL # BLD AUTO: 0.57 THOUSAND/ÂΜL (ref 0–0.61)
EOSINOPHIL NFR BLD AUTO: 10 % (ref 0–6)
ERYTHROCYTE [DISTWIDTH] IN BLOOD BY AUTOMATED COUNT: 12.9 % (ref 11.6–15.1)
GFR SERPL CREATININE-BSD FRML MDRD: 53 ML/MIN/1.73SQ M
GLUCOSE P FAST SERPL-MCNC: 89 MG/DL (ref 65–99)
HCT VFR BLD AUTO: 44.5 % (ref 36.5–49.3)
HDLC SERPL-MCNC: 48 MG/DL
HGB BLD-MCNC: 14.6 G/DL (ref 12–17)
IMM GRANULOCYTES # BLD AUTO: 0.03 THOUSAND/UL (ref 0–0.2)
IMM GRANULOCYTES NFR BLD AUTO: 1 % (ref 0–2)
LDLC SERPL CALC-MCNC: 93 MG/DL (ref 0–100)
LYMPHOCYTES # BLD AUTO: 1.46 THOUSANDS/ÂΜL (ref 0.6–4.47)
LYMPHOCYTES NFR BLD AUTO: 25 % (ref 14–44)
MCH RBC QN AUTO: 30.5 PG (ref 26.8–34.3)
MCHC RBC AUTO-ENTMCNC: 32.8 G/DL (ref 31.4–37.4)
MCV RBC AUTO: 93 FL (ref 82–98)
MONOCYTES # BLD AUTO: 0.66 THOUSAND/ÂΜL (ref 0.17–1.22)
MONOCYTES NFR BLD AUTO: 11 % (ref 4–12)
NEUTROPHILS # BLD AUTO: 2.96 THOUSANDS/ÂΜL (ref 1.85–7.62)
NEUTS SEG NFR BLD AUTO: 51 % (ref 43–75)
NRBC BLD AUTO-RTO: 0 /100 WBCS
PLATELET # BLD AUTO: 228 THOUSANDS/UL (ref 149–390)
PMV BLD AUTO: 11.6 FL (ref 8.9–12.7)
POTASSIUM SERPL-SCNC: 4.1 MMOL/L (ref 3.5–5.3)
PROT SERPL-MCNC: 6.6 G/DL (ref 6.4–8.4)
RBC # BLD AUTO: 4.78 MILLION/UL (ref 3.88–5.62)
SODIUM SERPL-SCNC: 138 MMOL/L (ref 135–147)
TRIGL SERPL-MCNC: 159 MG/DL (ref ?–150)
TSH SERPL DL<=0.05 MIU/L-ACNC: 1.43 UIU/ML (ref 0.45–4.5)
WBC # BLD AUTO: 5.8 THOUSAND/UL (ref 4.31–10.16)

## 2025-04-08 PROCEDURE — 84443 ASSAY THYROID STIM HORMONE: CPT

## 2025-04-08 PROCEDURE — 80053 COMPREHEN METABOLIC PANEL: CPT

## 2025-04-08 PROCEDURE — 85025 COMPLETE CBC W/AUTO DIFF WBC: CPT

## 2025-04-08 PROCEDURE — 36415 COLL VENOUS BLD VENIPUNCTURE: CPT

## 2025-04-08 PROCEDURE — 80061 LIPID PANEL: CPT

## 2025-04-11 NOTE — TELEPHONE ENCOUNTER
Internal Medicine Teaching Service ([facility])  Discharge Summary   Patient: Delmi Rodriguez Discharge Date: 4/11/2025 [facility] Hospital: [facility]   YOB: 1959 Admission Date: 4/9/2025 [facility] PGY-II: Xochitl Phelps MD   MRN: [id number] Admission Length:2 Days PCP: Kailey Wasserman MD   Admitting Physician: Raffaele Moore DO Discharge Physician: Raffaele Moore DO [facility] Team Color: RED     Admission Information     Admission Diagnoses:   Other chest pain [R07.89] Primary Discharge Diagnoses:   # Chest pain   # New heart failure with mildly reduced ejection 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  In the ED, she had stable vitals with some mild hypertension, negative troponins, initial EKG with some nonspecific T wave abnormalities consistent with prior EKGs and no strong evidence of acute ischemia, however a repeat ECG showed T wave inversions in the anterior/lateral leads; troponins remain negative.  She declined aspirin as she cited history of a nonspecific reaction to it.  Her chest pain did seem to improve after sublingual nitroglycerin.  Patient was admitted to the internal medicine teaching service for further workup.  A dobutamine stress echo was inconclusive due to a baseline abnormal ECG, which revealed an ejection fraction of 41% without obvious regional wall motion abnormalities.  Cardiology was consulted for new heart failure with mildly reduced ejection fraction who recommended starting low-dose GDMT, outpatient cardiac monitoring, and follow-up with Dr. Ellsworth in cardiology clinic.      Consultants:  IP Consult Orders (From admission,  Pt  Aware and would like to follow up with Dr Mitchell Last  Order placed for ortho  onward)       Start     Ordered    04/10/25 1448  Inpatient consult to Cardiology  ONE TIME        Provider:  Nathan Ellsworth MD    04/10/25 1450                    Physical Exam   Visit Vitals  /67 (BP Location: LUE - Left upper extremity)   Pulse (!) 57   Temp 98 °F (36.7 °C) (Oral)   Resp 18   Ht 5' 2\" (1.575 m)   Wt 54.6 kg (120 lb 6.4 oz)   SpO2 98%   BMI 22.02 kg/m²     Wt Readings from Last 1 Encounters:   04/09/25 54.6 kg (120 lb 6.4 oz)        Constiutional: Comfortable appearing female. Speaks freely in full sentences. No acute distress.  HEENT: EOMI, no scleral icterus or conjunctival pallor, MMM.  Cardiovascular: RRR, normal S1 & S2, no murmurs, rubs, or gallops.  Respiratory: Normal rate and effort. Clear to auscultation bilaterally.  Gastrointestinal: Soft, mildly distended, non-tender.  MSK/Extremities: DP and radial pulses 2+ and symmetric. No peripheral edema.  Skin: No rashes, jaundice, or other lesions on exposed skin.  Neurologic: Fully alert and oriented, motor and sensory grossly intact.  Psychiatric: Affect and mood appropriate.     Recommendations - Instructions - Follow-up     DISCHARGE INSTRUCTIONS     As part of your transition home, we are providing you with this set of discharge instructions, as a guide to help you in that process. In addition to the care provided during your hospital stay, there may be upcoming clinic visits, laboratory appointments, and/or results noted on this After Visit Summary (AVS).     To assist the physicians caring for you during this transition, we would like you remind you of the following:    Please call a Provider for help if you experience any of the following: Any questions or concerns    Activity Instructions: Normal activity as tolerated    Dressing/Wound Instructions: Not applicable    Lifting & Weight-bearing Instructions: No restrictions    Diet: Cardiac (low cholesterol/ Low fat/ No added salt)    Medication Changes: See your After Visit  Summary.    Follow-up:     Please schedule an appointment with your new cardiologist, shanique Phan. [phone].   Please also follow up with your PCP within 1-2 weeks of discharge.       If you have any questions 24-48 hours after discharge, please feel free to contact the hospital unit/department you were discharged from.       Unresulted:  Unresulted Labs (From admission, onward)      None          Unresulted Procedure (From admission, onward)      None            Follow-up Providers/Appointments:  No follow-up provider specified.    Medications        Summary of your Discharge Medications        Take these Medications        Details   acetaminophen 500 MG tablet  Commonly known as: TYLENOL   Take 1,000 mg by mouth every 8 hours.     albuterol 108 (90 Base) MCG/ACT inhaler   [None received]     atorvastatin 40 MG tablet  Commonly known as: LIPITOR   Take 40 mg by mouth daily.     chlorhexidine gluconate 0.12 % solution  Commonly known as: PERIDEX   Swish and spit 15 mLs  in the morning and 15 mLs in the evening.     empagliflozin 10 MG tablet  Commonly known as: JARDIANCE  Start taking on: April 12, 2025   Take 1 tablet by mouth daily. Begin taking on April 12, 2025.     famotidine 20 MG tablet  Commonly known as: PEPCID   Take 1 tablet by mouth in the morning and 1 tablet in the evening.     lidocaine 4 % patch  Commonly known as: LIDOCARE   Place 1 patch onto the skin daily as needed for Pain.     losartan 50 MG tablet  Commonly known as: COZAAR  Start taking on: April 12, 2025   Take 1 tablet by mouth daily. Begin taking on April 12, 2025.     metFORMIN 500 MG tablet  Commonly known as: GLUCOPHAGE   Take 1 tablet by mouth daily (with breakfast).     Restasis 0.05 % ophthalmic emulsion   Generic drug: cycloSPORINE  Apply 1 drop to eye every 12 hours.                ____________________________  Xochitl Phelps MD  Internal Medicine PGY-2  4/11/2025 11:42 AM  Pager: [phone])    This patient was  discussed with attending physician Raffeale Hutchins DO. Please see below or additional addendum note for any further details.     Attending Addendum:

## 2025-04-27 DIAGNOSIS — I10 ESSENTIAL HYPERTENSION: ICD-10-CM

## 2025-04-28 RX ORDER — HYDROCHLOROTHIAZIDE 25 MG/1
12.5 TABLET ORAL DAILY
Qty: 45 TABLET | Refills: 1 | Status: SHIPPED | OUTPATIENT
Start: 2025-04-28

## 2025-05-14 ENCOUNTER — OFFICE VISIT (OUTPATIENT)
Dept: FAMILY MEDICINE CLINIC | Facility: CLINIC | Age: 80
End: 2025-05-14
Payer: MEDICARE

## 2025-05-14 ENCOUNTER — APPOINTMENT (OUTPATIENT)
Dept: LAB | Facility: CLINIC | Age: 80
End: 2025-05-14
Payer: MEDICARE

## 2025-05-14 VITALS
HEART RATE: 60 BPM | WEIGHT: 157 LBS | DIASTOLIC BLOOD PRESSURE: 84 MMHG | HEIGHT: 68 IN | OXYGEN SATURATION: 98 % | BODY MASS INDEX: 23.79 KG/M2 | SYSTOLIC BLOOD PRESSURE: 130 MMHG

## 2025-05-14 DIAGNOSIS — N18.31 STAGE 3A CHRONIC KIDNEY DISEASE (HCC): Primary | ICD-10-CM

## 2025-05-14 DIAGNOSIS — R97.20 ELEVATED PROSTATE SPECIFIC ANTIGEN (PSA): ICD-10-CM

## 2025-05-14 DIAGNOSIS — Z79.899 CONTROLLED SUBSTANCE AGREEMENT SIGNED: ICD-10-CM

## 2025-05-14 DIAGNOSIS — R73.9 HYPERGLYCEMIA: ICD-10-CM

## 2025-05-14 DIAGNOSIS — E78.2 MIXED HYPERLIPIDEMIA: ICD-10-CM

## 2025-05-14 DIAGNOSIS — I10 PRIMARY HYPERTENSION: ICD-10-CM

## 2025-05-14 DIAGNOSIS — Z00.00 ENCOUNTER FOR ANNUAL WELLNESS VISIT (AWV) IN MEDICARE PATIENT: ICD-10-CM

## 2025-05-14 DIAGNOSIS — Z63.79 STRESS DUE TO ILLNESS OF FAMILY MEMBER: ICD-10-CM

## 2025-05-14 LAB — PSA SERPL-MCNC: 4.03 NG/ML (ref 0–4)

## 2025-05-14 PROCEDURE — 84153 ASSAY OF PSA TOTAL: CPT

## 2025-05-14 PROCEDURE — 99214 OFFICE O/P EST MOD 30 MIN: CPT | Performed by: FAMILY MEDICINE

## 2025-05-14 PROCEDURE — 36415 COLL VENOUS BLD VENIPUNCTURE: CPT

## 2025-05-14 PROCEDURE — G2211 COMPLEX E/M VISIT ADD ON: HCPCS | Performed by: FAMILY MEDICINE

## 2025-05-14 PROCEDURE — G0439 PPPS, SUBSEQ VISIT: HCPCS | Performed by: FAMILY MEDICINE

## 2025-05-14 RX ORDER — CLONAZEPAM 0.5 MG/1
0.25 TABLET ORAL 2 TIMES DAILY PRN
Qty: 30 TABLET | Refills: 0 | Status: SHIPPED | OUTPATIENT
Start: 2025-05-14

## 2025-05-14 RX ORDER — GINSENG 100 MG
CAPSULE ORAL
COMMUNITY

## 2025-05-14 RX ORDER — VIT C/B6/B5/MAGNESIUM/HERB 173 50-5-6-5MG
CAPSULE ORAL
COMMUNITY

## 2025-05-14 NOTE — ASSESSMENT & PLAN NOTE
PSA was not drawn by the lab, though it was ordered and still has a valid order in the chart.  He will go today to have that redone.

## 2025-05-14 NOTE — PATIENT INSTRUCTIONS
1. Stage 3a chronic kidney disease (HCC)  Assessment & Plan:  Lab Results   Component Value Date    EGFR 53 04/08/2025    EGFR 51 11/10/2023    EGFR 58 10/11/2022    CREATININE 1.25 04/08/2025    CREATININE 1.31 (H) 11/10/2023    CREATININE 1.17 10/11/2022   GFR continues to be slightly low versus goal.  This is likely because of age.  Encourage hydration.  No change.         2. Primary hypertension  Assessment & Plan:  Blood pressure today is at goal.  Continue with hydrochlorothiazide, metoprolol without change.       3. Mixed hyperlipidemia  Assessment & Plan:  Cholesterol is doing well.  Not on medications.  Given advanced age I would not recommend starting any at this time.       4. Elevated prostate specific antigen (PSA)  Assessment & Plan:  PSA was not drawn by the lab, though it was ordered and still has a valid order in the chart.  He will go today to have that redone.       5. Hyperglycemia  Assessment & Plan:  Blood sugar was elevated before, but on this blood work it was not.       6. Controlled substance agreement signed  Assessment & Plan:  Reviewed about controlled substance with clonazepam.       Orders:  -     clonazePAM (KlonoPIN) 0.5 mg tablet; Take 0.5 tablets (0.25 mg total) by mouth 2 (two) times a day as needed for anxiety  7. Stress due to illness of family member  Assessment & Plan:  Patient does continue to use clonazepam on occasion.  30 has lasted in quite a while.  Continue.  PDMP reviewed.       Orders:  -     clonazePAM (KlonoPIN) 0.5 mg tablet; Take 0.5 tablets (0.25 mg total) by mouth 2 (two) times a day as needed for anxiety  8. Encounter for annual wellness visit (AWV) in Medicare patient  Comments:  Reviewed health maintenance, advance directives, vaccines.      COVID 19 Instructions    Parker Hinojosa was advised to limit contact with others to essential tasks such as getting food, medications, and medical care.    Proper handwashing reviewed, and Hand sanitzer when washing is  S/p Nina 86 OU- Previous high myope OU not available.    If the patient develops symptoms of COVID 19, the patient should call the office as soon as possible.    It is strongly recommended that Flu Vaccinations be obtained.      Virtual Visits:  You should get a text message when the provider is ready to see you.  Click on the link in the text message, and the call should start.  Make sure you allow camera and microphone access.  This is HIPPA compliant, and secure.  Also, you could access this visit from [employer] in the [facility] catarino.      If you have not already done so, get immunized to COVID 19.      We are committed to getting you vaccinated as soon as possible and will be closely following [facility] and [facility] guidelines as they are released and revised.  Please refer to our COVID-19 vaccine webpage for the most up to date information on the vaccine and our distribution efforts.    This site will also have the most up to date recommendations for COVID booster vaccine.    [email or web address]    Call 3-298-[phone] ([phone]), option 7    You can also visit https://www.vaccines.gov/ to find vaccines in your area.    OUR LOCATION:    [facility]  [facility], [address]  [place], [place], [address] [phone]  Fax: [phone]    Lab services, Rheumatology, and OB/GYN are at this location as well.    Medicare Preventive Visit Patient Instructions  Thank you for completing your Welcome to Medicare Visit or Medicare Annual Wellness Visit today. Your next wellness visit will be due in one year (5/15/2026).  The screening/preventive services that you may require over the next 5-10 years are detailed below. Some tests may not apply to you based off risk factors and/or age. Screening tests ordered at today's visit but not completed yet may show as past due. Also, please note that scanned in results may not display below.  Preventive Screenings:  Service Recommendations Previous  Testing/Comments   Colorectal Cancer Screening  Colonoscopy    Fecal Occult Blood Test (FOBT)/Fecal Immunochemical Test (FIT)  Fecal DNA/Cologuard Test  Flexible Sigmoidoscopy Age: 45-75 years old   Colonoscopy: every 10 years (May be performed more frequently if at higher risk)  OR  FOBT/FIT: every 1 year  OR  Cologuard: every 3 years  OR  Sigmoidoscopy: every 5 years  Screening may be recommended earlier than age 45 if at higher risk for colorectal cancer. Also, an individualized decision between you and your healthcare provider will decide whether screening between the ages of 76-85 would be appropriate. Colonoscopy: Not on file  FOBT/FIT: Not on file  Cologuard: Not on file  Sigmoidoscopy: Not on file    Risks and Benefits Discussed  Screening Current     Prostate Cancer Screening Individualized decision between patient and health care provider in men between ages of 55-69   Medicare will cover every 12 months beginning on the day after your 50th birthday PSA: 3.17 ng/mL     Screening Not Indicated     Hepatitis C Screening Once for adults born between 1945 and 1965  More frequently in patients at high risk for Hepatitis C Hep C Antibody: Not on file    Screening Not Indicated   Diabetes Screening 1-2 times per year if you're at risk for diabetes or have pre-diabetes Fasting glucose: 89 mg/dL (4/8/2025)  A1C: No results in last 5 years (No results in last 5 years)  Screening Current   Cholesterol Screening Once every 5 years if you don't have a lipid disorder. May order more often based on risk factors. Lipid panel: 04/08/2025  Screening Not Indicated  History Lipid Disorder      Other Preventive Screenings Covered by Medicare:  Abdominal Aortic Aneurysm (AAA) Screening: covered once if your at risk. You're considered to be at risk if you have a family history of AAA or a male between the age of 65-75 who smoking at least 100 cigarettes in your lifetime.  Lung Cancer Screening: covers low dose CT scan once per  year if you meet all of the following conditions: (1) Age 55-77; (2) No signs or symptoms of lung cancer; (3) Current smoker or have quit smoking within the last 15 years; (4) You have a tobacco smoking history of at least 20 pack years (packs per day x number of years you smoked); (5) You get a written order from a healthcare provider.  Glaucoma Screening: covered annually if you're considered high risk: (1) You have diabetes OR (2) Family history of glaucoma OR (3)  aged 50 and older OR (4)  American aged 65 and older  Osteoporosis Screening: covered every 2 years if you meet one of the following conditions: (1) Have a vertebral abnormality; (2) On glucocorticoid therapy for more than 3 months; (3) Have primary hyperparathyroidism; (4) On osteoporosis medications and need to assess response to drug therapy.  HIV Screening: covered annually if you're between the age of 15-65. Also covered annually if you are younger than 15 and older than 65 with risk factors for HIV infection. For pregnant patients, it is covered up to 3 times per pregnancy.    Immunizations:  Immunization Recommendations   Influenza Vaccine Annual influenza vaccination during flu season is recommended for all persons aged >= 6 months who do not have contraindications   Pneumococcal Vaccine   * Pneumococcal conjugate vaccine = PCV13 (Prevnar 13), PCV15 (Vaxneuvance), PCV20 (Prevnar 20)  * Pneumococcal polysaccharide vaccine = PPSV23 (Pneumovax) Adults 19-65 yo with certain risk factors or if 65+ yo  If never received any pneumonia vaccine: recommend Prevnar 20 (PCV20)  Give PCV20 if previously received 1 dose of PCV13 or PPSV23   Hepatitis B Vaccine 3 dose series if at intermediate or high risk (ex: diabetes, end stage renal disease, liver disease)   Respiratory syncytial virus (RSV) Vaccine - COVERED BY MEDICARE PART D  * RSVPreF3 (Arexvy) CDC recommends that adults 60 years of age and older may receive a single dose of  RSV vaccine using shared clinical decision-making (SCDM)   Tetanus (Td) Vaccine - COST NOT COVERED BY MEDICARE PART B Following completion of primary series, a booster dose should be given every 10 years to maintain immunity against tetanus. Td may also be given as tetanus wound prophylaxis.   Tdap Vaccine - COST NOT COVERED BY MEDICARE PART B Recommended at least once for all adults. For pregnant patients, recommended with each pregnancy.   Shingles Vaccine (Shingrix) - COST NOT COVERED BY MEDICARE PART B  2 shot series recommended in those 19 years and older who have or will have weakened immune systems or those 50 years and older     Health Maintenance Due:  There are no preventive care reminders to display for this patient.  Immunizations Due:      Topic Date Due   • COVID-19 Vaccine (4 - 2024-25 season) 09/01/2024     Advance Directives   What are advance directives?  Advance directives are legal documents that state your wishes and plans for medical care. These plans are made ahead of time in case you lose your ability to make decisions for yourself. Advance directives can apply to any medical decision, such as the treatments you want, and if you want to donate organs.   What are the types of advance directives?  There are many types of advance directives, and each state has rules about how to use them. You may choose a combination of any of the following:  Living will:  This is a written record of the treatment you want. You can also choose which treatments you do not want, which to limit, and which to stop at a certain time. This includes surgery, medicine, IV fluid, and tube feedings.   Durable power of  for healthcare (DPAHC):  This is a written record that states who you want to make healthcare choices for you when you are unable to make them for yourself. This person, called a proxy, is usually a family member or a friend. You may choose more than 1 proxy.  Do not resuscitate (DNR) order:  A DNR  order is used in case your heart stops beating or you stop breathing. It is a request not to have certain forms of treatment, such as CPR. A DNR order may be included in other types of advance directives.  Medical directive:  This covers the care that you want if you are in a coma, near death, or unable to make decisions for yourself. You can list the treatments you want for each condition. Treatment may include pain medicine, surgery, blood transfusions, dialysis, IV or tube feedings, and a ventilator (breathing machine).  Values history:  This document has questions about your views, beliefs, and how you feel and think about life. This information can help others choose the care that you would choose.  Why are advance directives important?  An advance directive helps you control your care. Although spoken wishes may be used, it is better to have your wishes written down. Spoken wishes can be misunderstood, or not followed. Treatments may be given even if you do not want them. An advance directive may make it easier for your family to make difficult choices about your care.       © Copyright [employer] 2018 Information is for End User's use only and may not be sold, redistributed or otherwise used for commercial purposes. All illustrations and images included in CareNotes® are the copyrighted property of A.D.A.M., Inc. or [employer]

## 2025-05-14 NOTE — ASSESSMENT & PLAN NOTE
Lab Results   Component Value Date    EGFR 53 04/08/2025    EGFR 51 11/10/2023    EGFR 58 10/11/2022    CREATININE 1.25 04/08/2025    CREATININE 1.31 (H) 11/10/2023    CREATININE 1.17 10/11/2022   GFR continues to be slightly low versus goal.  This is likely because of age.  Encourage hydration.  No change.

## 2025-05-14 NOTE — ASSESSMENT & PLAN NOTE
Reviewed about controlled substance with clonazepam.  Orders:  •  clonazePAM (KlonoPIN) 0.5 mg tablet; Take 0.5 tablets (0.25 mg total) by mouth 2 (two) times a day as needed for anxiety

## 2025-05-14 NOTE — ASSESSMENT & PLAN NOTE
Cholesterol is doing well.  Not on medications.  Given advanced age I would not recommend starting any at this time.

## 2025-05-14 NOTE — ASSESSMENT & PLAN NOTE
Patient does continue to use clonazepam on occasion.  30 has lasted in quite a while.  Continue.  PDMP reviewed.  Orders:  •  clonazePAM (KlonoPIN) 0.5 mg tablet; Take 0.5 tablets (0.25 mg total) by mouth 2 (two) times a day as needed for anxiety

## 2025-05-14 NOTE — PROGRESS NOTES
Name: Parker Hinojosa      : 1945      MRN: [id number]  Encounter Provider: Jose L Higuera MD  Encounter Date: 2025   Encounter department: [facility]  :  Assessment & Plan  Stage 3a chronic kidney disease (HCC)  Lab Results   Component Value Date    EGFR 53 2025    EGFR 51 11/10/2023    EGFR 58 10/11/2022    CREATININE 1.25 2025    CREATININE 1.31 (H) 11/10/2023    CREATININE 1.17 10/11/2022   GFR continues to be slightly low versus goal.  This is likely because of age.  Encourage hydration.  No change.         Primary hypertension  Blood pressure today is at goal.  Continue with hydrochlorothiazide, metoprolol without change.       Mixed hyperlipidemia  Cholesterol is doing well.  Not on medications.  Given advanced age I would not recommend starting any at this time.       Elevated prostate specific antigen (PSA)  PSA was not drawn by the lab, though it was ordered and still has a valid order in the chart.  He will go today to have that redone.       Hyperglycemia  Blood sugar was elevated before, but on this blood work it was not.       Controlled substance agreement signed  Reviewed about controlled substance with clonazepam.  Orders:  •  clonazePAM (KlonoPIN) 0.5 mg tablet; Take 0.5 tablets (0.25 mg total) by mouth 2 (two) times a day as needed for anxiety    Stress due to illness of family member  Patient does continue to use clonazepam on occasion.  30 has lasted in quite a while.  Continue.  PDMP reviewed.  Orders:  •  clonazePAM (KlonoPIN) 0.5 mg tablet; Take 0.5 tablets (0.25 mg total) by mouth 2 (two) times a day as needed for anxiety    Encounter for annual wellness visit (AWV) in Medicare patient            Preventive health issues were discussed with patient, and age appropriate screening tests were ordered as noted in patient's After Visit Summary. Personalized health advice and appropriate referrals for health education or preventive services  given if needed, as noted in patient's After Visit Summary.      1. Stage 3a chronic kidney disease (HCC)  Assessment & Plan:  Lab Results   Component Value Date    EGFR 53 04/08/2025    EGFR 51 11/10/2023    EGFR 58 10/11/2022    CREATININE 1.25 04/08/2025    CREATININE 1.31 (H) 11/10/2023    CREATININE 1.17 10/11/2022   GFR continues to be slightly low versus goal.  This is likely because of age.  Encourage hydration.  No change.         2. Primary hypertension  Assessment & Plan:  Blood pressure today is at goal.  Continue with hydrochlorothiazide, metoprolol without change.       3. Mixed hyperlipidemia  Assessment & Plan:  Cholesterol is doing well.  Not on medications.  Given advanced age I would not recommend starting any at this time.       4. Elevated prostate specific antigen (PSA)  Assessment & Plan:  PSA was not drawn by the lab, though it was ordered and still has a valid order in the chart.  He will go today to have that redone.       5. Hyperglycemia  Assessment & Plan:  Blood sugar was elevated before, but on this blood work it was not.       6. Controlled substance agreement signed  Assessment & Plan:  Reviewed about controlled substance with clonazepam.  Orders:  •  clonazePAM (KlonoPIN) 0.5 mg tablet; Take 0.5 tablets (0.25 mg total) by mouth 2 (two) times a day as needed for anxiety    Orders:  -     clonazePAM (KlonoPIN) 0.5 mg tablet; Take 0.5 tablets (0.25 mg total) by mouth 2 (two) times a day as needed for anxiety  7. Stress due to illness of family member  Assessment & Plan:  Patient does continue to use clonazepam on occasion.  30 has lasted in quite a while.  Continue.  PDMP reviewed.  Orders:  •  clonazePAM (KlonoPIN) 0.5 mg tablet; Take 0.5 tablets (0.25 mg total) by mouth 2 (two) times a day as needed for anxiety    Orders:  -     clonazePAM (KlonoPIN) 0.5 mg tablet; Take 0.5 tablets (0.25 mg total) by mouth 2 (two) times a day as needed for anxiety  8. Encounter for annual wellness  visit (AWV) in Medicare patient  Comments:  Reviewed health maintenance, advance directives, vaccines.      Chief Complaint   Patient presents with   • Medicare Wellness Visit   • Follow-up     Follow up to chronic conditions and review lab results.          History of Present Illness     HPI   Patient Care Team:  Jose L Higuera MD as PCP - General  Jose L Higuera MD    Review of Systems  Medical History Reviewed by provider this encounter:       Annual Wellness Visit Questionnaire   Parker is here for his Subsequent Wellness visit. Last Medicare Wellness visit information reviewed, patient interviewed, no change since last AWV.     Fall Risk Screening:   In the past year, patient has experienced: no history of falling in past year      Home Safety:  Patient does not have trouble with stairs inside or outside of their home. Patient has working smoke alarms and has working carbon monoxide detector. Home safety hazards include: none.     Medications:   Patient is not currently taking any over-the-counter supplements. Patient is able to manage medications.     Activities of Daily Living (ADLs)/Instrumental Activities of Daily Living (IADLs):   Walk and transfer into and out of bed and chair?: Yes  Dress and groom yourself?: Yes    Bathe or shower yourself?: Yes    Feed yourself? Yes  Do your laundry/housekeeping?: Yes  Manage your money, pay your bills and track your expenses?: Yes  Make your own meals?: Yes    Do your own shopping?: Yes    Previous Hospitalizations:   Any hospitalizations or ED visits within the last 12 months?: No      Advance Care Planning:   Living will: Yes    Durable POA for healthcare: Yes    Advanced directive: Yes    Advanced directive counseling given: Yes      Preventive Screenings      Cardiovascular Screening:    General: Screening Not Indicated and History Lipid Disorder      Diabetes Screening:     General: Screening Current      Colorectal Cancer Screening:      "General: Risks and Benefits Discussed and Screening Current      Prostate Cancer Screening:    General: Screening Not Indicated      Osteoporosis Screening:    General: Screening Not Indicated      Abdominal Aortic Aneurysm (AAA) Screening:        General: Screening Not Indicated      Lung Cancer Screening:     General: Screening Not Indicated      Hepatitis C Screening:    General: Screening Not Indicated    Screening, Brief Intervention, and Referral to Treatment (SBIRT)     Screening  Typical number of drinks in a day: 0  Typical number of drinks in a week: 0  Interpretation: Low risk drinking behavior.    Single Item Drug Screening:  How often have you used an illegal drug (including marijuana) or a prescription medication for non-medical reasons in the past year? never    Single Item Drug Screen Score: 0  Interpretation: Negative screen for possible drug use disorder    Social Drivers of Health     Financial Resource Strain: Low Risk  (5/1/2024)    Overall Financial Resource Strain (CARDIA)    • Difficulty of Paying Living Expenses: Not hard at all   Food Insecurity: No Food Insecurity (5/1/2024)    Nursing - Inadequate Food Risk Classification    • Worried About Running Out of Food in the Last Year: Never true    • Ran Out of Food in the Last Year: Never true   Transportation Needs: No Transportation Needs (5/1/2024)    PRAPARE - Transportation    • Lack of Transportation (Medical): No    • Lack of Transportation (Non-Medical): No   Housing Stability: Low Risk  (5/1/2024)    Housing Stability Vital Sign    • Unable to Pay for Housing in the Last Year: No    • Number of Times Moved in the Last Year: 0    • Homeless in the Last Year: No   Utilities: Not At Risk (5/1/2024)    [facility] Utilities    • Threatened with loss of utilities: No     No results found.    Objective   /84 (BP Location: Right arm, Patient Position: Sitting, Cuff Size: Standard)   Pulse 60   Ht 5' 8\" (1.727 m)   Wt 71.2 kg (157 lb)   SpO2 " 98%   BMI 23.87 kg/m²     TSH 1.426.  Total cholesterol 173, LDL 93, HDL 48, triglycerides 159.  Sodium 138, potassium 4.1, calcium 10.3.  Creatinine 1.25, GFR 53.  AST 13, ALT 14.  Blood sugar 89.  White count 5.8.  Hemoglobin 14.6, hematocrit 44.5, platelets 228.    Physical Exam

## 2025-05-14 NOTE — ASSESSMENT & PLAN NOTE
Blood pressure today is at goal.  Continue with hydrochlorothiazide, metoprolol without change.

## 2025-06-10 ENCOUNTER — RESULTS FOLLOW-UP (OUTPATIENT)
Dept: FAMILY MEDICINE CLINIC | Facility: CLINIC | Age: 80
End: 2025-06-10

## 2025-07-28 ENCOUNTER — APPOINTMENT (OUTPATIENT)
Dept: LAB | Facility: CLINIC | Age: 80
End: 2025-07-28
Payer: MEDICARE

## 2025-07-28 DIAGNOSIS — R97.20 ELEVATED PROSTATE SPECIFIC ANTIGEN (PSA): ICD-10-CM

## 2025-07-28 LAB — PSA SERPL-MCNC: 3.75 NG/ML (ref 0–4)

## 2025-07-28 PROCEDURE — 84153 ASSAY OF PSA TOTAL: CPT

## 2025-07-28 PROCEDURE — 36415 COLL VENOUS BLD VENIPUNCTURE: CPT
